# Patient Record
Sex: MALE | Race: WHITE | NOT HISPANIC OR LATINO | Employment: OTHER | URBAN - METROPOLITAN AREA
[De-identification: names, ages, dates, MRNs, and addresses within clinical notes are randomized per-mention and may not be internally consistent; named-entity substitution may affect disease eponyms.]

---

## 2024-01-13 ENCOUNTER — HOSPITAL ENCOUNTER (INPATIENT)
Facility: CLINIC | Age: 49
LOS: 13 days | Discharge: HOME | End: 2024-01-26
Attending: EMERGENCY MEDICINE | Admitting: INTERNAL MEDICINE
Payer: COMMERCIAL

## 2024-01-13 ENCOUNTER — APPOINTMENT (OUTPATIENT)
Dept: CT IMAGING | Facility: CLINIC | Age: 49
End: 2024-01-13
Attending: EMERGENCY MEDICINE
Payer: COMMERCIAL

## 2024-01-13 ENCOUNTER — APPOINTMENT (OUTPATIENT)
Dept: ULTRASOUND IMAGING | Facility: CLINIC | Age: 49
End: 2024-01-13
Attending: EMERGENCY MEDICINE
Payer: COMMERCIAL

## 2024-01-13 ENCOUNTER — APPOINTMENT (OUTPATIENT)
Dept: MRI IMAGING | Facility: CLINIC | Age: 49
End: 2024-01-13
Attending: EMERGENCY MEDICINE
Payer: COMMERCIAL

## 2024-01-13 DIAGNOSIS — N17.9 AKI (ACUTE KIDNEY INJURY) (H): ICD-10-CM

## 2024-01-13 DIAGNOSIS — K85.10 GALLSTONE PANCREATITIS: ICD-10-CM

## 2024-01-13 DIAGNOSIS — R79.89 ELEVATED LFTS: ICD-10-CM

## 2024-01-13 DIAGNOSIS — R00.0 TACHYCARDIA: ICD-10-CM

## 2024-01-13 DIAGNOSIS — K21.00 GASTROESOPHAGEAL REFLUX DISEASE WITH ESOPHAGITIS, UNSPECIFIED WHETHER HEMORRHAGE: ICD-10-CM

## 2024-01-13 DIAGNOSIS — K85.10 ACUTE BILIARY PANCREATITIS, UNSPECIFIED COMPLICATION STATUS: ICD-10-CM

## 2024-01-13 DIAGNOSIS — K83.1 BILIARY OBSTRUCTION (H): Primary | ICD-10-CM

## 2024-01-13 PROBLEM — K80.20 CALCULUS OF GALLBLADDER WITHOUT CHOLECYSTITIS: Status: ACTIVE | Noted: 2024-01-13

## 2024-01-13 PROBLEM — K85.90 ACUTE PANCREATITIS: Status: ACTIVE | Noted: 2024-01-13

## 2024-01-13 PROBLEM — E87.1 HYPONATREMIA: Status: ACTIVE | Noted: 2024-01-13

## 2024-01-13 LAB
ALBUMIN SERPL BCG-MCNC: 3.4 G/DL (ref 3.5–5.2)
ALP SERPL-CCNC: 106 U/L (ref 40–150)
ALT SERPL W P-5'-P-CCNC: 112 U/L (ref 0–70)
AMMONIA PLAS-SCNC: 14 UMOL/L (ref 16–60)
ANION GAP SERPL CALCULATED.3IONS-SCNC: 13 MMOL/L (ref 7–15)
AST SERPL W P-5'-P-CCNC: 133 U/L (ref 0–45)
BASOPHILS # BLD AUTO: 0.1 10E3/UL (ref 0–0.2)
BASOPHILS NFR BLD AUTO: 0 %
BILIRUB SERPL-MCNC: 6.1 MG/DL
BUN SERPL-MCNC: 68.4 MG/DL (ref 6–20)
CA-I BLD-MCNC: 3.2 MG/DL (ref 4.4–5.2)
CALCIUM SERPL-MCNC: 7.2 MG/DL (ref 8.6–10)
CHLORIDE SERPL-SCNC: 87 MMOL/L (ref 98–107)
CREAT SERPL-MCNC: 2.5 MG/DL (ref 0.67–1.17)
DEPRECATED HCO3 PLAS-SCNC: 22 MMOL/L (ref 22–29)
EGFRCR SERPLBLD CKD-EPI 2021: 31 ML/MIN/1.73M2
EOSINOPHIL # BLD AUTO: 0.1 10E3/UL (ref 0–0.7)
EOSINOPHIL NFR BLD AUTO: 0 %
ERYTHROCYTE [DISTWIDTH] IN BLOOD BY AUTOMATED COUNT: 14.8 % (ref 10–15)
GLUCOSE BLDC GLUCOMTR-MCNC: 102 MG/DL (ref 70–99)
GLUCOSE BLDC GLUCOMTR-MCNC: 111 MG/DL (ref 70–99)
GLUCOSE SERPL-MCNC: 141 MG/DL (ref 70–99)
HCT VFR BLD AUTO: 40.5 % (ref 40–53)
HGB BLD-MCNC: 14.3 G/DL (ref 13.3–17.7)
HOLD SPECIMEN: NORMAL
IMM GRANULOCYTES # BLD: 0.1 10E3/UL
IMM GRANULOCYTES NFR BLD: 1 %
LACTATE SERPL-SCNC: 1.6 MMOL/L (ref 0.7–2)
LIPASE SERPL-CCNC: 776 U/L (ref 13–60)
LYMPHOCYTES # BLD AUTO: 0.7 10E3/UL (ref 0.8–5.3)
LYMPHOCYTES NFR BLD AUTO: 4 %
MAGNESIUM SERPL-MCNC: 1.3 MG/DL (ref 1.7–2.3)
MCH RBC QN AUTO: 29.5 PG (ref 26.5–33)
MCHC RBC AUTO-ENTMCNC: 35.3 G/DL (ref 31.5–36.5)
MCV RBC AUTO: 84 FL (ref 78–100)
MONOCYTES # BLD AUTO: 1.7 10E3/UL (ref 0–1.3)
MONOCYTES NFR BLD AUTO: 10 %
NEUTROPHILS # BLD AUTO: 13.3 10E3/UL (ref 1.6–8.3)
NEUTROPHILS NFR BLD AUTO: 85 %
NRBC # BLD AUTO: 0 10E3/UL
NRBC BLD AUTO-RTO: 0 /100
PLATELET # BLD AUTO: 227 10E3/UL (ref 150–450)
POTASSIUM SERPL-SCNC: 4.4 MMOL/L (ref 3.4–5.3)
PROT SERPL-MCNC: 6.6 G/DL (ref 6.4–8.3)
RBC # BLD AUTO: 4.84 10E6/UL (ref 4.4–5.9)
SARS-COV-2 RNA RESP QL NAA+PROBE: NEGATIVE
SODIUM SERPL-SCNC: 122 MMOL/L (ref 135–145)
SODIUM SERPL-SCNC: 124 MMOL/L (ref 135–145)
TRIGL SERPL-MCNC: 185 MG/DL
WBC # BLD AUTO: 15.9 10E3/UL (ref 4–11)

## 2024-01-13 PROCEDURE — 87040 BLOOD CULTURE FOR BACTERIA: CPT | Performed by: EMERGENCY MEDICINE

## 2024-01-13 PROCEDURE — 36415 COLL VENOUS BLD VENIPUNCTURE: CPT | Performed by: INTERNAL MEDICINE

## 2024-01-13 PROCEDURE — 250N000011 HC RX IP 250 OP 636: Performed by: EMERGENCY MEDICINE

## 2024-01-13 PROCEDURE — 83605 ASSAY OF LACTIC ACID: CPT | Performed by: EMERGENCY MEDICINE

## 2024-01-13 PROCEDURE — 99223 1ST HOSP IP/OBS HIGH 75: CPT | Performed by: INTERNAL MEDICINE

## 2024-01-13 PROCEDURE — 36415 COLL VENOUS BLD VENIPUNCTURE: CPT | Performed by: EMERGENCY MEDICINE

## 2024-01-13 PROCEDURE — 84478 ASSAY OF TRIGLYCERIDES: CPT | Performed by: INTERNAL MEDICINE

## 2024-01-13 PROCEDURE — 87635 SARS-COV-2 COVID-19 AMP PRB: CPT | Performed by: INTERNAL MEDICINE

## 2024-01-13 PROCEDURE — 258N000003 HC RX IP 258 OP 636: Performed by: INTERNAL MEDICINE

## 2024-01-13 PROCEDURE — 250N000011 HC RX IP 250 OP 636: Performed by: INTERNAL MEDICINE

## 2024-01-13 PROCEDURE — 96375 TX/PRO/DX INJ NEW DRUG ADDON: CPT

## 2024-01-13 PROCEDURE — 80053 COMPREHEN METABOLIC PANEL: CPT | Performed by: EMERGENCY MEDICINE

## 2024-01-13 PROCEDURE — 74176 CT ABD & PELVIS W/O CONTRAST: CPT

## 2024-01-13 PROCEDURE — 74181 MRI ABDOMEN W/O CONTRAST: CPT

## 2024-01-13 PROCEDURE — 99285 EMERGENCY DEPT VISIT HI MDM: CPT | Mod: 25

## 2024-01-13 PROCEDURE — 76705 ECHO EXAM OF ABDOMEN: CPT

## 2024-01-13 PROCEDURE — 120N000001 HC R&B MED SURG/OB

## 2024-01-13 PROCEDURE — 96374 THER/PROPH/DIAG INJ IV PUSH: CPT

## 2024-01-13 PROCEDURE — 82140 ASSAY OF AMMONIA: CPT | Performed by: EMERGENCY MEDICINE

## 2024-01-13 PROCEDURE — 83735 ASSAY OF MAGNESIUM: CPT | Performed by: INTERNAL MEDICINE

## 2024-01-13 PROCEDURE — 83690 ASSAY OF LIPASE: CPT | Performed by: EMERGENCY MEDICINE

## 2024-01-13 PROCEDURE — 120N000013 HC R&B IMCU

## 2024-01-13 PROCEDURE — 85025 COMPLETE CBC W/AUTO DIFF WBC: CPT | Performed by: EMERGENCY MEDICINE

## 2024-01-13 PROCEDURE — 82330 ASSAY OF CALCIUM: CPT | Performed by: INTERNAL MEDICINE

## 2024-01-13 PROCEDURE — 84295 ASSAY OF SERUM SODIUM: CPT | Performed by: INTERNAL MEDICINE

## 2024-01-13 PROCEDURE — 258N000003 HC RX IP 258 OP 636: Performed by: EMERGENCY MEDICINE

## 2024-01-13 RX ORDER — NAPROXEN SODIUM 220 MG
220 TABLET ORAL EVERY 12 HOURS PRN
Status: ON HOLD | COMMUNITY
End: 2024-01-26

## 2024-01-13 RX ORDER — ONDANSETRON 2 MG/ML
4 INJECTION INTRAMUSCULAR; INTRAVENOUS EVERY 30 MIN PRN
Status: DISCONTINUED | OUTPATIENT
Start: 2024-01-13 | End: 2024-01-13

## 2024-01-13 RX ORDER — LIDOCAINE 40 MG/G
CREAM TOPICAL
Status: DISCONTINUED | OUTPATIENT
Start: 2024-01-13 | End: 2024-01-26 | Stop reason: HOSPADM

## 2024-01-13 RX ORDER — NITROGLYCERIN 0.4 MG/1
0.4 TABLET SUBLINGUAL EVERY 5 MIN PRN
Status: DISCONTINUED | OUTPATIENT
Start: 2024-01-13 | End: 2024-01-26 | Stop reason: HOSPADM

## 2024-01-13 RX ORDER — ENOXAPARIN SODIUM 100 MG/ML
40 INJECTION SUBCUTANEOUS EVERY 24 HOURS
Status: DISCONTINUED | OUTPATIENT
Start: 2024-01-13 | End: 2024-01-13

## 2024-01-13 RX ORDER — PROCHLORPERAZINE MALEATE 10 MG
10 TABLET ORAL EVERY 6 HOURS PRN
Status: DISCONTINUED | OUTPATIENT
Start: 2024-01-13 | End: 2024-01-26 | Stop reason: HOSPADM

## 2024-01-13 RX ORDER — MAGNESIUM SULFATE HEPTAHYDRATE 40 MG/ML
4 INJECTION, SOLUTION INTRAVENOUS ONCE
Status: COMPLETED | OUTPATIENT
Start: 2024-01-13 | End: 2024-01-14

## 2024-01-13 RX ORDER — PROCHLORPERAZINE 25 MG
25 SUPPOSITORY, RECTAL RECTAL EVERY 12 HOURS PRN
Status: DISCONTINUED | OUTPATIENT
Start: 2024-01-13 | End: 2024-01-26 | Stop reason: HOSPADM

## 2024-01-13 RX ORDER — HYDROMORPHONE HYDROCHLORIDE 1 MG/ML
0.5 INJECTION, SOLUTION INTRAMUSCULAR; INTRAVENOUS; SUBCUTANEOUS
Status: DISCONTINUED | OUTPATIENT
Start: 2024-01-13 | End: 2024-01-15

## 2024-01-13 RX ORDER — ONDANSETRON 2 MG/ML
4 INJECTION INTRAMUSCULAR; INTRAVENOUS ONCE
Status: COMPLETED | OUTPATIENT
Start: 2024-01-13 | End: 2024-01-13

## 2024-01-13 RX ORDER — AMOXICILLIN 250 MG
2 CAPSULE ORAL 2 TIMES DAILY PRN
Status: DISCONTINUED | OUTPATIENT
Start: 2024-01-13 | End: 2024-01-26 | Stop reason: HOSPADM

## 2024-01-13 RX ORDER — ONDANSETRON 4 MG/1
4 TABLET, ORALLY DISINTEGRATING ORAL EVERY 6 HOURS PRN
Status: DISCONTINUED | OUTPATIENT
Start: 2024-01-13 | End: 2024-01-26 | Stop reason: HOSPADM

## 2024-01-13 RX ORDER — IBUPROFEN 200 MG
400 TABLET ORAL EVERY 6 HOURS PRN
Status: ON HOLD | COMMUNITY
End: 2024-01-26

## 2024-01-13 RX ORDER — HYDROMORPHONE HYDROCHLORIDE 1 MG/ML
0.3 INJECTION, SOLUTION INTRAMUSCULAR; INTRAVENOUS; SUBCUTANEOUS
Status: DISCONTINUED | OUTPATIENT
Start: 2024-01-13 | End: 2024-01-16

## 2024-01-13 RX ORDER — PIPERACILLIN SODIUM, TAZOBACTAM SODIUM 3; .375 G/15ML; G/15ML
3.38 INJECTION, POWDER, LYOPHILIZED, FOR SOLUTION INTRAVENOUS EVERY 6 HOURS
Status: DISCONTINUED | OUTPATIENT
Start: 2024-01-13 | End: 2024-01-14

## 2024-01-13 RX ORDER — SODIUM CHLORIDE, SODIUM LACTATE, POTASSIUM CHLORIDE, CALCIUM CHLORIDE 600; 310; 30; 20 MG/100ML; MG/100ML; MG/100ML; MG/100ML
INJECTION, SOLUTION INTRAVENOUS CONTINUOUS
Status: DISCONTINUED | OUTPATIENT
Start: 2024-01-13 | End: 2024-01-15

## 2024-01-13 RX ORDER — AMOXICILLIN 250 MG
1 CAPSULE ORAL 2 TIMES DAILY PRN
Status: DISCONTINUED | OUTPATIENT
Start: 2024-01-13 | End: 2024-01-26 | Stop reason: HOSPADM

## 2024-01-13 RX ORDER — MORPHINE SULFATE 4 MG/ML
4 INJECTION, SOLUTION INTRAMUSCULAR; INTRAVENOUS
Status: DISCONTINUED | OUTPATIENT
Start: 2024-01-13 | End: 2024-01-13

## 2024-01-13 RX ORDER — MORPHINE SULFATE 4 MG/ML
4 INJECTION, SOLUTION INTRAMUSCULAR; INTRAVENOUS ONCE
Status: COMPLETED | OUTPATIENT
Start: 2024-01-13 | End: 2024-01-13

## 2024-01-13 RX ORDER — PIPERACILLIN SODIUM, TAZOBACTAM SODIUM 4; .5 G/20ML; G/20ML
4.5 INJECTION, POWDER, LYOPHILIZED, FOR SOLUTION INTRAVENOUS ONCE
Status: COMPLETED | OUTPATIENT
Start: 2024-01-13 | End: 2024-01-13

## 2024-01-13 RX ORDER — LIDOCAINE 40 MG/G
CREAM TOPICAL
Status: DISCONTINUED | OUTPATIENT
Start: 2024-01-13 | End: 2024-01-13

## 2024-01-13 RX ORDER — ONDANSETRON 2 MG/ML
4 INJECTION INTRAMUSCULAR; INTRAVENOUS EVERY 6 HOURS PRN
Status: DISCONTINUED | OUTPATIENT
Start: 2024-01-13 | End: 2024-01-26 | Stop reason: HOSPADM

## 2024-01-13 RX ADMIN — PIPERACILLIN AND TAZOBACTAM 3.38 G: 3; .375 INJECTION, POWDER, FOR SOLUTION INTRAVENOUS at 21:10

## 2024-01-13 RX ADMIN — SODIUM CHLORIDE, POTASSIUM CHLORIDE, SODIUM LACTATE AND CALCIUM CHLORIDE: 600; 310; 30; 20 INJECTION, SOLUTION INTRAVENOUS at 21:46

## 2024-01-13 RX ADMIN — MORPHINE SULFATE 4 MG: 4 INJECTION, SOLUTION INTRAMUSCULAR; INTRAVENOUS at 13:47

## 2024-01-13 RX ADMIN — ONDANSETRON 4 MG: 2 INJECTION INTRAMUSCULAR; INTRAVENOUS at 13:47

## 2024-01-13 RX ADMIN — ONDANSETRON 4 MG: 2 INJECTION INTRAMUSCULAR; INTRAVENOUS at 19:06

## 2024-01-13 RX ADMIN — MAGNESIUM SULFATE IN WATER 4 G: 40 INJECTION, SOLUTION INTRAVENOUS at 23:35

## 2024-01-13 RX ADMIN — SODIUM CHLORIDE 1000 ML: 9 INJECTION, SOLUTION INTRAVENOUS at 17:05

## 2024-01-13 RX ADMIN — PIPERACILLIN AND TAZOBACTAM 4.5 G: 4; .5 INJECTION, POWDER, FOR SOLUTION INTRAVENOUS at 15:32

## 2024-01-13 RX ADMIN — SODIUM CHLORIDE 1000 ML: 9 INJECTION, SOLUTION INTRAVENOUS at 16:26

## 2024-01-13 RX ADMIN — MORPHINE SULFATE 4 MG: 4 INJECTION, SOLUTION INTRAMUSCULAR; INTRAVENOUS at 19:06

## 2024-01-13 RX ADMIN — SODIUM CHLORIDE, POTASSIUM CHLORIDE, SODIUM LACTATE AND CALCIUM CHLORIDE 1000 ML: 600; 310; 30; 20 INJECTION, SOLUTION INTRAVENOUS at 19:53

## 2024-01-13 ASSESSMENT — ACTIVITIES OF DAILY LIVING (ADL)
ADLS_ACUITY_SCORE: 21
ADLS_ACUITY_SCORE: 35

## 2024-01-13 NOTE — PHARMACY-ADMISSION MEDICATION HISTORY
Pharmacy Intern Admission Medication History    Admission medication history is complete. The information provided in this note is only as accurate as the sources available at the time of the update.    Information Source(s): Patient via in-person    Pertinent Information: Verified that the patient does not take any prescription or OTC medications besides the two below. He began taking them a few days ago for pain.     Changes made to PTA medication list:  Added: Advil and Aleve  Deleted: None  Changed: None    Medication Affordability:  Not including over the counter (OTC) medications, was there a time in the past 3 months when you did not take your medications as prescribed because of cost?: No    Allergies reviewed with patient and updates made in EHR: yes    Medication History Completed By: Tabby Lipscomb 1/13/2024 4:27 PM    PTA Med List   Medication Sig Last Dose    ibuprofen (ADVIL/MOTRIN) 200 MG tablet Take 400 mg by mouth every 6 hours as needed for pain  at PRN    naproxen sodium (ANAPROX) 220 MG tablet Take 220 mg by mouth every 12 hours as needed for moderate pain  at PRN

## 2024-01-13 NOTE — ED NOTES
1 week of abdominal pain and bloating. Patient reports he had diarrhea last week, now having nausea and dry heaves. He reports pale colored stools. Patient yellow in appearance. Denies hx of liver issues. Patient states he is an occasional drinker. No previous surgeries on abdomen.

## 2024-01-13 NOTE — CONSULTS
GASTROENTEROLOGY CONSULTATION     Talha Alexander  [address]  [place] [address]  48 year old male    Admission Date/Time: 1/13/2024  Primary Care Provider: No Ref-Primary, Physician    We were asked to see the patient in consultation by Dr. Wallace for pancreatitis.        HPI:  Talha Alexander is a 48 year old male with no significant PMH who presents with 4 days of abdominal pain and distension. The pain is diffuse but worse in the LUQ. Prior to that he had mild nausea and diarrhea.  He has had no appetite and reports dry heaving.  He has had subjective fevers and chills.  With morphine his pain is now 3-4/10 but was 6-7/10 in severity before.  He is jaundiced.  He is currently alert but his girlfriend reports that he has not been able to sleep more than 15 min at a time and has been somnolent at times.  Stools have been pale as well.    EtOh consumption is described as rare.  No new medications.  Non-contrast CT scan in the ER shows acute pancreatitis with extensive peripancreatic inflammation.  Cholelithiasis but no obstructing stones are seen. Reactive wall thickening of the duodenum related to the pancreatitis is noted as well as small ascites and a trace pleural effusion.    LFTs show AP of 106, Tbil 6.1.  , . WBC is 15.9. Lipase is 776.  Zosyn was started. 2L of NS bolus was ordered. Other labs are notable for BRENDA of 2.5. Lactic acid is 1.6.      ROS: A comprehensive ten point review of systems was negative aside from those in mentioned in the HPI.      MEDICATIONS: No current facility-administered medications on file prior to encounter.  ibuprofen (ADVIL/MOTRIN) 200 MG tablet, Take 400 mg by mouth every 6 hours as needed for pain  naproxen sodium (ANAPROX) 220 MG tablet, Take 220 mg by mouth every 12 hours as needed for moderate pain        ALLERGIES:   Allergies   Allergen Reactions     Peanut-Containing Drug Products      Tree Nuts [Nuts]      PMH: none    PSH:  none      SOCIAL HISTORY: + smoking, rare ETOH, denies recreational drugs, works for [employer].       FAMILY HISTORY:  No hx of colon cancer, liver disease or other GI diseases.    PHYSICAL EXAM:   BP (!) 121/98   Pulse 105   Temp 97.1  F (36.2  C) (Temporal)   Resp 19   Wt 97.5 kg (215 lb)   SpO2 97%     Constitutional: NAD, comfortable  Cardiovascular: RRR, normal S1 and S2, no r/c/g/m  Respiratory: CTAB  Head: Normocephalic. Atraumatic.    Neck: Neck supple. Eyes:  PERRL, no icterus  ENT: Neck without nodes, hearing adequate, pharynx normal without erythema or exudate  Abdomen:   Diffusely tender, distended, +BS  NEURO: grossly negative  SKIN: no suspicious lesions or rashes            ADDITIONAL COMMENTS:   I reviewed the patient's new clinical lab test results.   Recent Labs   Lab Test 01/13/24  1333   WBC 15.9*   HGB 14.3   MCV 84        Recent Labs   Lab Test 01/13/24  1333   *   POTASSIUM 4.4   CHLORIDE 87*   CO2 22   BUN 68.4*   CR 2.50*   ANIONGAP 13   LUBNA 7.2*   *     Recent Labs   Lab Test 01/13/24  1333   ALBUMIN 3.4*   BILITOTAL 6.1*   *   *   ALKPHOS 106   LIPASE 776*     IMAGING:  CT ABD PELVIS 1/13/24    IMPRESSION:      1.  Acute pancreatitis with extensive peripancreatic inflammation as described. No organized peripancreatic fluid collections. This noncontrast exam cannot evaluate for parenchymal necrosis.     2.  Cholelithiasis. No biliary ductal dilation.     3.  Reactive wall thickening of the duodenum related to the pancreatitis.     4.  Small amount of ascites.     5.  Trace left pleural effusion with adjacent atelectasis.        .    CONSULTATION ASSESSMENT AND PLAN:    49 yo M admitted with abdominal pain, elevated LFTs, with imaging showing pancreatitis, cholelithiasis consistent with gallstone pancreatitis.  BISAP score is 2-3 ( 3 if you count trace pleural effusion) associated with increased risk of complications.     #Gallstone pancreatitis  -MRCP is  pending shortly.  If choledocholithiasis is confirmed, we will proceed with ERCP tomorrow. Discussed with Dr. Wilson.  -If MRCP is negative, continue supportive treatment with aggressive fluid resuscitation with LR with another 1-2 L and then 150 mL/hr or as tolerated.  (Monitor respiratory status given trace pleural effusion seen on imaging).  -trend LFTs.  -NPO.   -continue pain medications and anti-emetics as needed.   -TG were added on.   -continue antibiotics.    -Please call if there is concern for cholangitis.      #Hepatic Steatosis-   -Abstain from alcohol  -Outpatient GI clinic follow up    #BRENDA - monitor response to IVF. Will defer further work up to primary team as needed.     Thank your for allowing me to participate in the care of this patient.  Please call with any questions.    Indira Mccloud MD  [facility]  Total time: ~40 min    ADDENDUM: MRCP shows obstructing bile duct stones.  The pt is scheduled for ERCP tomorrow at 9:30AM.      The peripancreatic fluid is now described as hemorrhagic.  I reviewed with IR to clarify if there was concern for acute bleeding.  They did not recommend any IR intervention at this time or further imaging.      I am concerned that if there is further bleeding, the patient may develop compartment syndrome.  I updated Dr. Jay and recommended a surgery consult.  (Cornejo placement if not done already and serial bladder pressure measurement would be helpful.)  Dr. Jay will also repeat labs tonight to see if we are adequately volume resuscitating him and see him again tonight.  Ideally, we would see the BUN decrease and a hemo-dilutional effect from fluid resucitation but a dropping hemoglobin may be due to intrabdominal bleeding as well so this will need to be interpreted in the setting of his clinical stability and abdominal exam.  Urine output of >0.5mL/kg/hr would be an alternative way to assessing adequate fluid resuscitation.  We discussed that  additional boluses would be reasonable before starting maintenance fluids if there has not been significant improvement after the 3 L already given/ordered.    I will follow up again early in the morning but please call with any questions or updates in changes in clinical status.      Indira Mccloud MD  [facility]  [phone]

## 2024-01-13 NOTE — H&P
[facility]    History and Physical  Hospitalist       Date of Admission:  1/13/2024    Assessment & Plan   Talha Alexander is a 48 year old male With no significant past medical history presents with acute pancreatitis, possibly biliary in origin.    Principal Problem:    BRENDA (acute kidney injury) (H24)    Acute pancreatitis    Calculus of gallbladder without cholecystitis    Hyponatremia    Elevated LFTs  Hypocalcemia-ionized calcium ordered.    Acute biliary pancreatitis, unspecified complication status  28-year-old male with no significant past medical history presents with 2-day history of abdominal bloating, CT abdomen indicates extensive signs of acute pancreatitis, no choledocholithiasis noted but Kylie lithiasis noted without cholecystitis.  Ultrasound of the abdomen was also obtained which did not indicate any choledocholithiasis.  Patient was noted to have lipase 776, he takes alcohol occasionally, last drink was 2 to 3 weeks ago, he was noted to have BRENDA with a creatinine of 2.5 and sodium of 122.  He had poor oral intake for 2 days.  LFTs are also elevated with a bilirubin 6.1, patient was probably would have passed a CBD stone.  White count very elevated at 15.9.  [facility] was consulted in the ED and they recommended MRCP to decide whether to do ERCP.  Admit to inpatient, continue IV fluids, LR at 150 cc/h, patient did receive a dose of Zosyn in the ER, will not be continuing that since there is no signs of necrotic pancreatitis or cholecystitis.  --[facility] has been consulted, MRCP has been ordered, please request general surgery consult for future cholecystectomy Depending on how patient improves.  --Continue IV narcotics for pain control, continue n.p.o., IV fluids.  --Repeat labs in a.m. sodium is low at 122, possibly hypovolemic considering his elevated BUN levels.  --will check sodium every 4 hours, slow correction recommended, 6 to 8 mEq over next 24  hours.  --His BRENDA seems to be prerenal considering the events, no prior labs to compare.  Addendum :7:22 PM-MRCP RESULTED .  At least 2 choledocholiths within the distal common bile duct measuring up to 3 mm with mild upstream biliary ductal dilation.Acute hemorrhagic pancreatitis with extensive peripancreatic inflammation and fluid as described. No organized peripancreatic fluid collections. No pancreatic ductal dilation. This noncontrast exam cannot evaluate for parenchymal necrosis.  WILL HOLD LOVENOX ,GI following . ERCP ? Timing, npo.will continue antibiotics as necrosis cannot be differentiated and wbc is high .  Discussed with  GI , repeated labs to further evaluate the fluid status .  Ionized calcium is low , possibly due to severe pancreatitis and saponification .  Addendum 10 pm  Changed patient to IMC status.  DVT Prophylaxis: Enoxaparin (Lovenox) subcutaneous stopped once hemorrhagic pancreatitis noted in CT   Code Status: Full Code    Disposition: Expected discharge in 2 to 3 days depending on improvement of symptoms and labs.    Carisa Jay MD    Primary Care Physician   Physician No Ref-Primary    Chief Complaint   Abdominal swelling and bloating 2 days    History is obtained from the patient and medical records    History of Present Illness   Talha Alexander is a 48 year old male with no significant past medical history, history of occasional alcohol use, last time he had some alcohol was 2 to 3 weeks ago, symptoms started as diarrhea 6 days ago, he had noticed some per colored stool over last 48 hours, diarrhea since resolved, patient was having nausea for few days, days prior to admission he noticed that he has significant abdominal bloating, increased nausea and generalized abdominal tenderness and pain.  He did not have any diarrhea, family noticed that he was jaundiced on the day of admission.  Did not have any fever or chills, no similar illness noted in the family, no prior history of  pancreatitis, no family history of pancreatitis.  Patient did not see a doctor on regular basis and was never admitted in the hospital recently for any illness.  In the emergency department labs were drawn and he was noted to have a white count of 15.9, a dose of Zosyn was given, CT abdomen noted to show extensive pancreatitis with peripancreatic inflammation and fluid, no organized fluid collection noted, bilirubin 6.1, glucose 141, lipase 776, LFTs are elevated x 4, BUN 68.4 with a creatinine of 2.5 and sodium 122.  Calcium is also low at 7.2, will request ionized calcium levels.    Past Medical History    I have reviewed this patient's medical history and updated it with pertinent information if needed.       Past Surgical History   I have reviewed this patient's surgical history and updated it with pertinent information if needed.      Prior to Admission Medications   None     Allergies   Allergies   Allergen Reactions    Peanut-Containing Drug Products     Tree Nuts [Nuts]        Social History   I have reviewed this patient's social history and updated it with pertinent information if needed. Talha STEVENS Catherine      Family History   I have reviewed this patient's family history and updated it with pertinent information if needed.     Review of Systems   The 10 point Review of Systems is negative other than noted in the HPI or here.     Physical Exam   Temp: 97.1  F (36.2  C) Temp src: Temporal BP: (!) 121/98 Pulse: 105   Resp: 19 SpO2: 97 %      Vital Signs with Ranges  Temp:  [97.1  F (36.2  C)] 97.1  F (36.2  C)  Pulse:  [105-115] 105  Resp:  [10-27] 19  BP: (121-142)/(89-98) 121/98  SpO2:  [95 %-98 %] 97 %  215 lbs 0 oz    Constitutional: Awake, alert, cooperative, no apparent distress.  Eyes: Conjunctiva and pupils examined and normal.  HEENT: Moist mucous membranes, normal dentition.  Respiratory: Clear to auscultation bilaterally, no crackles or wheezing.  Cardiovascular: Regular rate and rhythm, normal S1  and S2, and no murmur noted.  GI: Abdomen is distended, bowel sounds present.  Tenderness noted in the epigastric area.  Lymph/Hematologic: No anterior cervical or supraclavicular adenopathy.  Skin: No rashes, no cyanosis, no edema.  Musculoskeletal: No joint swelling, erythema or tenderness.  Neurologic: Cranial nerves 2-12 intact, normal strength and sensation.  Psychiatric: Alert, oriented to person, place and time, no obvious anxiety or depression.    Data   Data reviewed today:  I personally reviewed LABS AND IMAGING BELOW .  Recent Labs   Lab 01/13/24  1333   WBC 15.9*   HGB 14.3   MCV 84      *   POTASSIUM 4.4   CHLORIDE 87*   CO2 22   BUN 68.4*   CR 2.50*   ANIONGAP 13   LUBNA 7.2*   *   ALBUMIN 3.4*   PROTTOTAL 6.6   BILITOTAL 6.1*   ALKPHOS 106   *   *   LIPASE 776*       Imaging:  Recent Results (from the past 24 hour(s))   CT Abdomen Pelvis w/o Contrast    Narrative    EXAM: CT ABDOMEN PELVIS W/O CONTRAST  LOCATION: [facility]  DATE: 1/13/2024    INDICATION: Abdominal pain.  COMPARISON: None.  TECHNIQUE: CT scan of the abdomen and pelvis was performed without IV contrast. Multiplanar reformats were obtained. Dose reduction techniques were used.  CONTRAST: None.    FINDINGS:     LOWER CHEST: Trace left pleural effusion. Left basilar atelectasis.    HEPATOBILIARY: Cholelithiasis. No biliary ductal dilation. No liver lesions.    PANCREAS: Acute pancreatitis with extensive inflammation and unorganized fluid surrounding the entire pancreas extending into the left upper quadrant, central mesentery, and along the left greater right anterior pararenal fascia. Pockets of unorganized   fluid are present along the greater curvature of the stomach and along the left paracolic gutter. Small amount of ascites is also present around the liver and within the right lower quadrant. No organized peripancreatic fluid collections. No visible   ductal  dilation.    SPLEEN: Normal.    ADRENAL GLANDS: Normal.    KIDNEYS/BLADDER: Normal.    BOWEL: No bowel obstruction. Reactive wall thickening of the duodenum. Appendix is not clearly visualized.    LYMPH NODES: No enlarged lymph node.    VASCULATURE: No abdominal aortic aneurysm.    PELVIC ORGANS: Normal.    MUSCULOSKELETAL: Small fat-containing periumbilical hernia. Multilevel degenerative changes of the spine.      Impression    IMPRESSION:     1.  Acute pancreatitis with extensive peripancreatic inflammation as described. No organized peripancreatic fluid collections. This noncontrast exam cannot evaluate for parenchymal necrosis.    2.  Cholelithiasis. No biliary ductal dilation.    3.  Reactive wall thickening of the duodenum related to the pancreatitis.    4.  Small amount of ascites.    5.  Trace left pleural effusion with adjacent atelectasis.   US Abdomen Limited    Narrative    EXAM: US ABDOMEN LIMITED  LOCATION: [facility]  DATE: 1/13/2024    INDICATION: RUQ pain  COMPARISON: CT earlier today  TECHNIQUE: Limited abdominal ultrasound.    FINDINGS:    GALLBLADDER: Small shadowing stones and sludge within the gallbladder lumen. No gallbladder wall thickening or pericholecystic fluid. Sonographic Mark sign was negative.    BILE DUCTS: No biliary dilatation. The common duct measures 6 mm.    LIVER: Heterogeneous echogenicity and echotexture with mild hepatic steatosis. No focal mass.    RIGHT KIDNEY: No hydronephrosis.    PANCREAS: The pancreas is largely obscured by overlying gas.    Small ascites..      Impression    IMPRESSION:  1.  Cholelithiasis.  2.  Mildly heterogeneous liver with mild hepatic steatosis.  3.  Small ascites.  4.  Known pancreatitis was better assessed on the CT earlier today.

## 2024-01-13 NOTE — ED TRIAGE NOTES
Diarrhea last weekend, nausea, sore abdomen, can't eat or sleep. Dry heaves. Pt appears jaundice.

## 2024-01-13 NOTE — ED PROVIDER NOTES
History     Chief Complaint:  Abdominal Pain       HPI   Talha Alexander is a 48 year old male who presents with 1 week of abdominal bloating with loose stools and occasional dry heaves.  He states that he did have some diarrhea earlier in the week that is now resolving, but does endorse dry heaves without actual vomit for the last 3 days as well.  He states that his stools have become more pale and his female friend at the bedside is noted that he is more jaundiced appearing with  yellowing of his eyes and skin.  Patient does not drink excess of alcohol, has no history of hepatitis, and no abdominal pain but states he has not been to a doctor in a very long time.  No chest pain, no numbness or tingling.  No fainting.      Independent Historian:   Yes female friend at bedside confirms the above history.    Review of External Notes: N/A      Allergies:  Peanut-Containing Drug Products  Tree Nuts [Nuts]     Medications:    No current outpatient medications on file.      Past Medical History:    No past medical history on file.    Past Surgical History:    No past surgical history on file.     Family History:    family history is not on file.    Social History:     PCP: No primary care provider on file.     Physical Exam   Patient Vitals for the past 24 hrs:   BP Temp Temp src Pulse Resp SpO2 Weight   01/13/24 1341 -- -- -- 108 12 98 % --   01/13/24 1340 -- -- -- 109 10 98 % --   01/13/24 1339 -- -- -- 110 14 98 % --   01/13/24 1338 -- -- -- 108 12 97 % --   01/13/24 1325 (!) 121/98 -- -- 111 21 97 % --   01/13/24 1311 (!) 142/89 97.1  F (36.2  C) Temporal 115 24 96 % 97.5 kg (215 lb)        Physical Exam  Vitals: reviewed by me  General: Pt seen on [facility], pleasant, cooperative, and alert to conversation  Eyes: Tracking well, clear conjunctiva BL  ENT: MMM, midline trachea.   Lungs: No tachypnea, no accessory muscle use. No respiratory distress.   CV: Rate as above  Abd: Soft, quite bloated, tender in the  upper left and upper right quadrants without focal rebound.  MSK: no joint effusion.  No evidence of trauma  Skin: No rash  Neuro: Clear speech and no facial droop.  Psych: Not RIS, no e/o AH/VH          Emergency Department Course   No results found for this or any previous visit.      Imaging:  US Abdomen Limited   Final Result   IMPRESSION:   1.  Cholelithiasis.   2.  Mildly heterogeneous liver with mild hepatic steatosis.   3.  Small ascites.   4.  Known pancreatitis was better assessed on the CT earlier today.            CT Abdomen Pelvis w/o Contrast   Final Result   IMPRESSION:       1.  Acute pancreatitis with extensive peripancreatic inflammation as described. No organized peripancreatic fluid collections. This noncontrast exam cannot evaluate for parenchymal necrosis.      2.  Cholelithiasis. No biliary ductal dilation.      3.  Reactive wall thickening of the duodenum related to the pancreatitis.      4.  Small amount of ascites.      5.  Trace left pleural effusion with adjacent atelectasis.      MR Abdomen MRCP without Contrast    (Results Pending)      Report per radiology    Laboratory:  Labs Ordered and Resulted from Time of ED Arrival to Time of ED Departure   COMPREHENSIVE METABOLIC PANEL - Abnormal       Result Value    Sodium 122 (*)     Potassium 4.4      Carbon Dioxide (CO2) 22      Anion Gap 13      Urea Nitrogen 68.4 (*)     Creatinine 2.50 (*)     GFR Estimate 31 (*)     Calcium 7.2 (*)     Chloride 87 (*)     Glucose 141 (*)     Alkaline Phosphatase 106       (*)      (*)     Protein Total 6.6      Albumin 3.4 (*)     Bilirubin Total 6.1 (*)    LIPASE - Abnormal    Lipase 776 (*)    AMMONIA - Abnormal    Ammonia 14 (*)    CBC WITH PLATELETS AND DIFFERENTIAL - Abnormal    WBC Count 15.9 (*)     RBC Count 4.84      Hemoglobin 14.3      Hematocrit 40.5      MCV 84      MCH 29.5      MCHC 35.3      RDW 14.8      Platelet Count 227      % Neutrophils 85      % Lymphocytes 4      %  Monocytes 10      % Eosinophils 0      % Basophils 0      % Immature Granulocytes 1      NRBCs per 100 WBC 0      Absolute Neutrophils 13.3 (*)     Absolute Lymphocytes 0.7 (*)     Absolute Monocytes 1.7 (*)     Absolute Eosinophils 0.1      Absolute Basophils 0.1      Absolute Immature Granulocytes 0.1      Absolute NRBCs 0.0     LACTIC ACID WHOLE BLOOD - Normal    Lactic Acid 1.6     BLOOD CULTURE   BLOOD CULTURE        Emergency Department Course & Assessments:      Interventions:  Medications   ondansetron (ZOFRAN) injection 4 mg (has no administration in time range)   morphine (PF) injection 4 mg (has no administration in time range)   piperacillin-tazobactam (ZOSYN) 4.5 g vial to attach to  mL bag (4.5 g Intravenous $New Bag 1/13/24 1532)   sodium chloride 0.9% BOLUS 1,000 mL (has no administration in time range)   sodium chloride 0.9% BOLUS 1,000 mL (has no administration in time range)   morphine (PF) injection 4 mg (4 mg Intravenous $Given 1/13/24 1347)   ondansetron (ZOFRAN) injection 4 mg (4 mg Intravenous $Given 1/13/24 1347)            Consultations/Discussion of Management or Tests:  Yes I spoke to Dr. Barron of the [facility] who is recommended an MRCP now in the ER and will consult on the patient and possibly proceed with an ERCP today.           Social Determinants of Health affecting care:   Stress/Adjustment Disorders      Disposition:  The patient was admitted to the hospital under the care of Dr. Jay.     Impression & Plan        Medical Decision Making:  This is a very pleasant 48-year-old who presents to the ER with what appears to be about a week of abdominal bloating, here now with significant jaundice and I think this is likely due to gallstone pancreatitis.  His ultrasound does show cholelithiasis, but no obvious stone seen, and CBD is technically within normal limits.  Given his pancreatitis and LFTs and jaundice however, I do think an MRCP and ERCP are indicated.   Given his white count and how pale he looks, will plan for antibiotics at this time, and monitor very carefully until GI can take him for the ERCP, or until the next inpatient bed is available as I spoke to the hospitalist team and they have generously agreed to accept care of the patient.  Abdomen is not peritonitic, patient seems to be doing well with pain meds currently.    Diagnosis:    ICD-10-CM    1. Gallstone pancreatitis  K85.10       2. Elevated LFTs  R79.89       3. Acute biliary pancreatitis, unspecified complication status  K85.10       4. BRENDA (acute kidney injury) (H24)  N17.9                   1/13/2024   All Wallace*        All Wallace MD  01/13/24 1559

## 2024-01-14 ENCOUNTER — ANESTHESIA (OUTPATIENT)
Dept: SURGERY | Facility: CLINIC | Age: 49
End: 2024-01-14
Payer: COMMERCIAL

## 2024-01-14 ENCOUNTER — APPOINTMENT (OUTPATIENT)
Dept: GENERAL RADIOLOGY | Facility: CLINIC | Age: 49
End: 2024-01-14
Attending: INTERNAL MEDICINE
Payer: COMMERCIAL

## 2024-01-14 ENCOUNTER — ANESTHESIA EVENT (OUTPATIENT)
Dept: SURGERY | Facility: CLINIC | Age: 49
End: 2024-01-14
Payer: COMMERCIAL

## 2024-01-14 LAB
ALBUMIN SERPL BCG-MCNC: 2.7 G/DL (ref 3.5–5.2)
ALP SERPL-CCNC: 79 U/L (ref 40–150)
ALT SERPL W P-5'-P-CCNC: 81 U/L (ref 0–70)
ANION GAP SERPL CALCULATED.3IONS-SCNC: 16 MMOL/L (ref 7–15)
AST SERPL W P-5'-P-CCNC: 106 U/L (ref 0–45)
BILIRUB SERPL-MCNC: 3.7 MG/DL
BUN SERPL-MCNC: 75.9 MG/DL (ref 6–20)
CALCIUM SERPL-MCNC: 6.6 MG/DL (ref 8.6–10)
CHLORIDE SERPL-SCNC: 92 MMOL/L (ref 98–107)
CREAT SERPL-MCNC: 3.06 MG/DL (ref 0.67–1.17)
CREAT SERPL-MCNC: 3.27 MG/DL (ref 0.67–1.17)
CREAT SERPL-MCNC: 3.61 MG/DL (ref 0.67–1.17)
CREAT UR-MCNC: 63.3 MG/DL
DEPRECATED HCO3 PLAS-SCNC: 16 MMOL/L (ref 22–29)
EGFRCR SERPLBLD CKD-EPI 2021: 20 ML/MIN/1.73M2
EGFRCR SERPLBLD CKD-EPI 2021: 23 ML/MIN/1.73M2
EGFRCR SERPLBLD CKD-EPI 2021: 25 ML/MIN/1.73M2
ERCP: NORMAL
ERYTHROCYTE [DISTWIDTH] IN BLOOD BY AUTOMATED COUNT: 15.4 % (ref 10–15)
FRACT EXCRET NA UR+SERPL-RTO: 1.4 %
GLUCOSE SERPL-MCNC: 114 MG/DL (ref 70–99)
HCT VFR BLD AUTO: 33.5 % (ref 40–53)
HGB BLD-MCNC: 11.2 G/DL (ref 13.3–17.7)
HGB BLD-MCNC: 11.8 G/DL (ref 13.3–17.7)
MAGNESIUM SERPL-MCNC: 2.5 MG/DL (ref 1.7–2.3)
MCH RBC QN AUTO: 29.5 PG (ref 26.5–33)
MCHC RBC AUTO-ENTMCNC: 35.2 G/DL (ref 31.5–36.5)
MCV RBC AUTO: 84 FL (ref 78–100)
OSMOLALITY UR: 393 MMOL/KG (ref 100–1200)
PLATELET # BLD AUTO: 196 10E3/UL (ref 150–450)
POTASSIUM SERPL-SCNC: 4.6 MMOL/L (ref 3.4–5.3)
PROT SERPL-MCNC: 5.7 G/DL (ref 6.4–8.3)
RBC # BLD AUTO: 4 10E6/UL (ref 4.4–5.9)
SODIUM SERPL-SCNC: 124 MMOL/L (ref 135–145)
SODIUM SERPL-SCNC: 126 MMOL/L (ref 135–145)
SODIUM UR-SCNC: 30 MMOL/L
WBC # BLD AUTO: 13 10E3/UL (ref 4–11)

## 2024-01-14 PROCEDURE — 0F7D8DZ DILATION OF PANCREATIC DUCT WITH INTRALUMINAL DEVICE, VIA NATURAL OR ARTIFICIAL OPENING ENDOSCOPIC: ICD-10-PCS | Performed by: INTERNAL MEDICINE

## 2024-01-14 PROCEDURE — 82565 ASSAY OF CREATININE: CPT | Performed by: INTERNAL MEDICINE

## 2024-01-14 PROCEDURE — 710N000009 HC RECOVERY PHASE 1, LEVEL 1, PER MIN: Performed by: INTERNAL MEDICINE

## 2024-01-14 PROCEDURE — 250N000011 HC RX IP 250 OP 636: Performed by: INTERNAL MEDICINE

## 2024-01-14 PROCEDURE — 80307 DRUG TEST PRSMV CHEM ANLYZR: CPT | Performed by: PAIN MEDICINE

## 2024-01-14 PROCEDURE — 250N000013 HC RX MED GY IP 250 OP 250 PS 637: Performed by: HOSPITALIST

## 2024-01-14 PROCEDURE — 84300 ASSAY OF URINE SODIUM: CPT | Performed by: HOSPITALIST

## 2024-01-14 PROCEDURE — 0FC98ZZ EXTIRPATION OF MATTER FROM COMMON BILE DUCT, VIA NATURAL OR ARTIFICIAL OPENING ENDOSCOPIC: ICD-10-PCS | Performed by: INTERNAL MEDICINE

## 2024-01-14 PROCEDURE — 84295 ASSAY OF SERUM SODIUM: CPT | Performed by: INTERNAL MEDICINE

## 2024-01-14 PROCEDURE — 83735 ASSAY OF MAGNESIUM: CPT | Performed by: INTERNAL MEDICINE

## 2024-01-14 PROCEDURE — 85018 HEMOGLOBIN: CPT | Performed by: INTERNAL MEDICINE

## 2024-01-14 PROCEDURE — 85027 COMPLETE CBC AUTOMATED: CPT | Performed by: INTERNAL MEDICINE

## 2024-01-14 PROCEDURE — 272N000001 HC OR GENERAL SUPPLY STERILE: Performed by: INTERNAL MEDICINE

## 2024-01-14 PROCEDURE — 250N000011 HC RX IP 250 OP 636: Performed by: NURSE ANESTHETIST, CERTIFIED REGISTERED

## 2024-01-14 PROCEDURE — C9113 INJ PANTOPRAZOLE SODIUM, VIA: HCPCS | Performed by: INTERNAL MEDICINE

## 2024-01-14 PROCEDURE — 99233 SBSQ HOSP IP/OBS HIGH 50: CPT | Performed by: HOSPITALIST

## 2024-01-14 PROCEDURE — 250N000011 HC RX IP 250 OP 636

## 2024-01-14 PROCEDURE — 999N000141 HC STATISTIC PRE-PROCEDURE NURSING ASSESSMENT: Performed by: INTERNAL MEDICINE

## 2024-01-14 PROCEDURE — 360N000083 HC SURGERY LEVEL 3 W/ FLUORO, PER MIN: Performed by: INTERNAL MEDICINE

## 2024-01-14 PROCEDURE — 250N000009 HC RX 250: Performed by: NURSE ANESTHETIST, CERTIFIED REGISTERED

## 2024-01-14 PROCEDURE — 74330 X-RAY BILE/PANC ENDOSCOPY: CPT | Mod: TC

## 2024-01-14 PROCEDURE — 258N000003 HC RX IP 258 OP 636: Performed by: INTERNAL MEDICINE

## 2024-01-14 PROCEDURE — C1769 GUIDE WIRE: HCPCS | Performed by: INTERNAL MEDICINE

## 2024-01-14 PROCEDURE — 120N000013 HC R&B IMCU

## 2024-01-14 PROCEDURE — C2617 STENT, NON-COR, TEM W/O DEL: HCPCS | Performed by: INTERNAL MEDICINE

## 2024-01-14 PROCEDURE — 0F798DZ DILATION OF COMMON BILE DUCT WITH INTRALUMINAL DEVICE, VIA NATURAL OR ARTIFICIAL OPENING ENDOSCOPIC: ICD-10-PCS | Performed by: INTERNAL MEDICINE

## 2024-01-14 PROCEDURE — 370N000017 HC ANESTHESIA TECHNICAL FEE, PER MIN: Performed by: INTERNAL MEDICINE

## 2024-01-14 PROCEDURE — 258N000003 HC RX IP 258 OP 636: Performed by: NURSE ANESTHETIST, CERTIFIED REGISTERED

## 2024-01-14 PROCEDURE — 36415 COLL VENOUS BLD VENIPUNCTURE: CPT | Performed by: INTERNAL MEDICINE

## 2024-01-14 PROCEDURE — 99254 IP/OBS CNSLTJ NEW/EST MOD 60: CPT | Performed by: INTERNAL MEDICINE

## 2024-01-14 PROCEDURE — 83935 ASSAY OF URINE OSMOLALITY: CPT | Performed by: HOSPITALIST

## 2024-01-14 DEVICE — STENT GEENEN PANCREA SOF-FLEX 5FRX3CM G49668 GPSOS-SF-5-3: Type: IMPLANTABLE DEVICE | Site: PANCREATIC DUCT | Status: FUNCTIONAL

## 2024-01-14 DEVICE — IMPLANTABLE DEVICE: Type: IMPLANTABLE DEVICE | Site: PANCREATIC DUCT | Status: FUNCTIONAL

## 2024-01-14 RX ORDER — HYDROMORPHONE HCL IN WATER/PF 6 MG/30 ML
0.4 PATIENT CONTROLLED ANALGESIA SYRINGE INTRAVENOUS EVERY 5 MIN PRN
Status: DISCONTINUED | OUTPATIENT
Start: 2024-01-14 | End: 2024-01-14 | Stop reason: HOSPADM

## 2024-01-14 RX ORDER — SODIUM CHLORIDE 9 MG/ML
INJECTION, SOLUTION INTRAVENOUS CONTINUOUS PRN
Status: DISCONTINUED | OUTPATIENT
Start: 2024-01-14 | End: 2024-01-14

## 2024-01-14 RX ORDER — FENTANYL CITRATE 0.05 MG/ML
25 INJECTION, SOLUTION INTRAMUSCULAR; INTRAVENOUS EVERY 5 MIN PRN
Status: DISCONTINUED | OUTPATIENT
Start: 2024-01-14 | End: 2024-01-14 | Stop reason: HOSPADM

## 2024-01-14 RX ORDER — LANOLIN ALCOHOL/MO/W.PET/CERES
3 CREAM (GRAM) TOPICAL
Status: DISCONTINUED | OUTPATIENT
Start: 2024-01-14 | End: 2024-01-26 | Stop reason: HOSPADM

## 2024-01-14 RX ORDER — ONDANSETRON 2 MG/ML
INJECTION INTRAMUSCULAR; INTRAVENOUS PRN
Status: DISCONTINUED | OUTPATIENT
Start: 2024-01-14 | End: 2024-01-14

## 2024-01-14 RX ORDER — SODIUM CHLORIDE, SODIUM LACTATE, POTASSIUM CHLORIDE, CALCIUM CHLORIDE 600; 310; 30; 20 MG/100ML; MG/100ML; MG/100ML; MG/100ML
INJECTION, SOLUTION INTRAVENOUS CONTINUOUS
Status: DISCONTINUED | OUTPATIENT
Start: 2024-01-14 | End: 2024-01-14 | Stop reason: HOSPADM

## 2024-01-14 RX ORDER — FLUMAZENIL 0.1 MG/ML
0.2 INJECTION, SOLUTION INTRAVENOUS
Status: ACTIVE | OUTPATIENT
Start: 2024-01-14 | End: 2024-01-15

## 2024-01-14 RX ORDER — LABETALOL HYDROCHLORIDE 5 MG/ML
5 INJECTION, SOLUTION INTRAVENOUS
Status: DISCONTINUED | OUTPATIENT
Start: 2024-01-14 | End: 2024-01-14 | Stop reason: HOSPADM

## 2024-01-14 RX ORDER — KETAMINE HYDROCHLORIDE 10 MG/ML
INJECTION INTRAMUSCULAR; INTRAVENOUS PRN
Status: DISCONTINUED | OUTPATIENT
Start: 2024-01-14 | End: 2024-01-14

## 2024-01-14 RX ORDER — NALOXONE HYDROCHLORIDE 0.4 MG/ML
0.4 INJECTION, SOLUTION INTRAMUSCULAR; INTRAVENOUS; SUBCUTANEOUS
Status: DISCONTINUED | OUTPATIENT
Start: 2024-01-14 | End: 2024-01-26 | Stop reason: HOSPADM

## 2024-01-14 RX ORDER — SODIUM CHLORIDE, SODIUM LACTATE, POTASSIUM CHLORIDE, CALCIUM CHLORIDE 600; 310; 30; 20 MG/100ML; MG/100ML; MG/100ML; MG/100ML
INJECTION, SOLUTION INTRAVENOUS CONTINUOUS PRN
Status: DISCONTINUED | OUTPATIENT
Start: 2024-01-14 | End: 2024-01-14

## 2024-01-14 RX ORDER — ONDANSETRON 4 MG/1
4 TABLET, ORALLY DISINTEGRATING ORAL EVERY 6 HOURS PRN
Status: DISCONTINUED | OUTPATIENT
Start: 2024-01-14 | End: 2024-01-15

## 2024-01-14 RX ORDER — NICOTINE 21 MG/24HR
1 PATCH, TRANSDERMAL 24 HOURS TRANSDERMAL DAILY
Status: DISCONTINUED | OUTPATIENT
Start: 2024-01-14 | End: 2024-01-26 | Stop reason: HOSPADM

## 2024-01-14 RX ORDER — MEPERIDINE HYDROCHLORIDE 25 MG/ML
12.5 INJECTION INTRAMUSCULAR; INTRAVENOUS; SUBCUTANEOUS EVERY 5 MIN PRN
Status: DISCONTINUED | OUTPATIENT
Start: 2024-01-14 | End: 2024-01-14 | Stop reason: HOSPADM

## 2024-01-14 RX ORDER — NALOXONE HYDROCHLORIDE 0.4 MG/ML
0.2 INJECTION, SOLUTION INTRAMUSCULAR; INTRAVENOUS; SUBCUTANEOUS
Status: DISCONTINUED | OUTPATIENT
Start: 2024-01-14 | End: 2024-01-26 | Stop reason: HOSPADM

## 2024-01-14 RX ORDER — ONDANSETRON 2 MG/ML
4 INJECTION INTRAMUSCULAR; INTRAVENOUS EVERY 6 HOURS PRN
Status: DISCONTINUED | OUTPATIENT
Start: 2024-01-14 | End: 2024-01-15

## 2024-01-14 RX ORDER — DEXAMETHASONE SODIUM PHOSPHATE 4 MG/ML
INJECTION, SOLUTION INTRA-ARTICULAR; INTRALESIONAL; INTRAMUSCULAR; INTRAVENOUS; SOFT TISSUE PRN
Status: DISCONTINUED | OUTPATIENT
Start: 2024-01-14 | End: 2024-01-14

## 2024-01-14 RX ORDER — PROPOFOL 10 MG/ML
INJECTION, EMULSION INTRAVENOUS CONTINUOUS PRN
Status: DISCONTINUED | OUTPATIENT
Start: 2024-01-14 | End: 2024-01-14

## 2024-01-14 RX ORDER — PIPERACILLIN SODIUM, TAZOBACTAM SODIUM 2; .25 G/10ML; G/10ML
2.25 INJECTION, POWDER, LYOPHILIZED, FOR SOLUTION INTRAVENOUS EVERY 6 HOURS
Status: DISCONTINUED | OUTPATIENT
Start: 2024-01-14 | End: 2024-01-15

## 2024-01-14 RX ORDER — FENTANYL CITRATE 50 UG/ML
INJECTION, SOLUTION INTRAMUSCULAR; INTRAVENOUS PRN
Status: DISCONTINUED | OUTPATIENT
Start: 2024-01-14 | End: 2024-01-14

## 2024-01-14 RX ORDER — FENTANYL CITRATE 0.05 MG/ML
50 INJECTION, SOLUTION INTRAMUSCULAR; INTRAVENOUS EVERY 5 MIN PRN
Status: DISCONTINUED | OUTPATIENT
Start: 2024-01-14 | End: 2024-01-14 | Stop reason: HOSPADM

## 2024-01-14 RX ORDER — ONDANSETRON 4 MG/1
4 TABLET, ORALLY DISINTEGRATING ORAL EVERY 30 MIN PRN
Status: DISCONTINUED | OUTPATIENT
Start: 2024-01-14 | End: 2024-01-14 | Stop reason: HOSPADM

## 2024-01-14 RX ORDER — ONDANSETRON 2 MG/ML
4 INJECTION INTRAMUSCULAR; INTRAVENOUS EVERY 30 MIN PRN
Status: DISCONTINUED | OUTPATIENT
Start: 2024-01-14 | End: 2024-01-14 | Stop reason: HOSPADM

## 2024-01-14 RX ORDER — HYDRALAZINE HYDROCHLORIDE 20 MG/ML
2.5-5 INJECTION INTRAMUSCULAR; INTRAVENOUS
Status: DISCONTINUED | OUTPATIENT
Start: 2024-01-14 | End: 2024-01-14 | Stop reason: HOSPADM

## 2024-01-14 RX ORDER — HYDROMORPHONE HCL IN WATER/PF 6 MG/30 ML
0.2 PATIENT CONTROLLED ANALGESIA SYRINGE INTRAVENOUS EVERY 5 MIN PRN
Status: DISCONTINUED | OUTPATIENT
Start: 2024-01-14 | End: 2024-01-14 | Stop reason: HOSPADM

## 2024-01-14 RX ADMIN — MELATONIN TAB 3 MG 3 MG: 3 TAB at 22:13

## 2024-01-14 RX ADMIN — SODIUM CHLORIDE, POTASSIUM CHLORIDE, SODIUM LACTATE AND CALCIUM CHLORIDE 1000 ML: 600; 310; 30; 20 INJECTION, SOLUTION INTRAVENOUS at 12:52

## 2024-01-14 RX ADMIN — PROPOFOL 150 MCG/KG/MIN: 10 INJECTION, EMULSION INTRAVENOUS at 09:53

## 2024-01-14 RX ADMIN — PIPERACILLIN AND TAZOBACTAM 3.38 G: 3; .375 INJECTION, POWDER, FOR SOLUTION INTRAVENOUS at 03:27

## 2024-01-14 RX ADMIN — ONDANSETRON 4 MG: 2 INJECTION INTRAMUSCULAR; INTRAVENOUS at 10:06

## 2024-01-14 RX ADMIN — PIPERACILLIN AND TAZOBACTAM 2.25 G: 2; .25 INJECTION, POWDER, FOR SOLUTION INTRAVENOUS at 09:17

## 2024-01-14 RX ADMIN — PIPERACILLIN AND TAZOBACTAM 2.25 G: 2; .25 INJECTION, POWDER, FOR SOLUTION INTRAVENOUS at 15:50

## 2024-01-14 RX ADMIN — SODIUM CHLORIDE, POTASSIUM CHLORIDE, SODIUM LACTATE AND CALCIUM CHLORIDE 150 ML: 600; 310; 30; 20 INJECTION, SOLUTION INTRAVENOUS at 14:23

## 2024-01-14 RX ADMIN — HYDROMORPHONE HYDROCHLORIDE 0.5 MG: 1 INJECTION, SOLUTION INTRAMUSCULAR; INTRAVENOUS; SUBCUTANEOUS at 07:31

## 2024-01-14 RX ADMIN — MELATONIN TAB 3 MG 3 MG: 3 TAB at 22:11

## 2024-01-14 RX ADMIN — SODIUM CHLORIDE, POTASSIUM CHLORIDE, SODIUM LACTATE AND CALCIUM CHLORIDE: 600; 310; 30; 20 INJECTION, SOLUTION INTRAVENOUS at 07:32

## 2024-01-14 RX ADMIN — FENTANYL CITRATE 50 MCG: 50 INJECTION INTRAMUSCULAR; INTRAVENOUS at 09:56

## 2024-01-14 RX ADMIN — SODIUM CHLORIDE: 9 INJECTION, SOLUTION INTRAVENOUS at 09:45

## 2024-01-14 RX ADMIN — HYDROMORPHONE HYDROCHLORIDE 0.5 MG: 1 INJECTION, SOLUTION INTRAMUSCULAR; INTRAVENOUS; SUBCUTANEOUS at 15:46

## 2024-01-14 RX ADMIN — MIDAZOLAM 2 MG: 1 INJECTION INTRAMUSCULAR; INTRAVENOUS at 09:48

## 2024-01-14 RX ADMIN — DEXAMETHASONE SODIUM PHOSPHATE 4 MG: 4 INJECTION, SOLUTION INTRA-ARTICULAR; INTRALESIONAL; INTRAMUSCULAR; INTRAVENOUS; SOFT TISSUE at 10:04

## 2024-01-14 RX ADMIN — HYDROMORPHONE HYDROCHLORIDE 0.5 MG: 1 INJECTION, SOLUTION INTRAMUSCULAR; INTRAVENOUS; SUBCUTANEOUS at 20:05

## 2024-01-14 RX ADMIN — PANTOPRAZOLE SODIUM 40 MG: 40 INJECTION, POWDER, FOR SOLUTION INTRAVENOUS at 07:31

## 2024-01-14 RX ADMIN — PANTOPRAZOLE SODIUM 40 MG: 40 INJECTION, POWDER, FOR SOLUTION INTRAVENOUS at 20:06

## 2024-01-14 RX ADMIN — Medication 10 MG: at 10:06

## 2024-01-14 RX ADMIN — PIPERACILLIN AND TAZOBACTAM 2.25 G: 2; .25 INJECTION, POWDER, FOR SOLUTION INTRAVENOUS at 22:11

## 2024-01-14 RX ADMIN — HYDROMORPHONE HYDROCHLORIDE 0.5 MG: 1 INJECTION, SOLUTION INTRAMUSCULAR; INTRAVENOUS; SUBCUTANEOUS at 22:10

## 2024-01-14 RX ADMIN — Medication 20 MG: at 09:54

## 2024-01-14 RX ADMIN — HYDROMORPHONE HYDROCHLORIDE 0.5 MG: 1 INJECTION, SOLUTION INTRAMUSCULAR; INTRAVENOUS; SUBCUTANEOUS at 17:56

## 2024-01-14 ASSESSMENT — ACTIVITIES OF DAILY LIVING (ADL)
ADLS_ACUITY_SCORE: 20
ADLS_ACUITY_SCORE: 22
ADLS_ACUITY_SCORE: 21
ADLS_ACUITY_SCORE: 20
ADLS_ACUITY_SCORE: 21
ADLS_ACUITY_SCORE: 22
ADLS_ACUITY_SCORE: 23
ADLS_ACUITY_SCORE: 21

## 2024-01-14 ASSESSMENT — ENCOUNTER SYMPTOMS
DYSRHYTHMIAS: 0
SEIZURES: 0

## 2024-01-14 ASSESSMENT — COPD QUESTIONNAIRES: COPD: 0

## 2024-01-14 ASSESSMENT — LIFESTYLE VARIABLES: TOBACCO_USE: 0

## 2024-01-14 NOTE — PLAN OF CARE
Goal Outcome Evaluation:      Plan of Care Reviewed With: patient    Overall Patient Progress: no changeOverall Patient Progress: no change       Date & Time: 2050 to 2250 on 1/13/2023   Behavior & Aggression: Green  Fall Risk: No  Orientation: A&Ox4  ABNL VS/O2: VSS on RA except tachycardia. Tele: Sinus Tachycardia.  ABNL Labs: Magnesium: 1.3 (to be replaced- awaiting pharmacy verification) & Sodium: 124.   Pain Management: Morphine x1 given while in ED. Declines need for further pain medications.   Bowel/Bladder: Adequate voiding. Abdomen rounded & distended- reported tenderness with palpation. BS hypoactive- not passing gas.   Skin: Skin color jaundiced & scleras yellow.   IV: LR at 150 mL/hr with int abx  Diet: NPO except for ice chips & sips with medications. Denies N/V.   Activity Level: Independent  Tests/Procedures: MRCP completed- see results.  Anticipated  DC Date: Pending  Significant Information: Surgical interventions pending. [place] [facility] [place] belongings remain with patient.

## 2024-01-14 NOTE — ANESTHESIA POSTPROCEDURE EVALUATION
Patient: Talha Alexander    Procedure: Procedure(s):  Endoscopic retrograde cholangiopancreatogram with stent placement       Anesthesia Type:  MAC    Note:     Postop Pain Control: Uneventful            Sign Out: Well controlled pain   PONV:    Neuro/Psych: Uneventful            Sign Out: Acceptable/Baseline neuro status   Airway/Respiratory: Uneventful            Sign Out: Acceptable/Baseline resp. status   CV/Hemodynamics: Uneventful            Sign Out: Acceptable CV status; No obvious hypovolemia; No obvious fluid overload   Other NRE:    DID A NON-ROUTINE EVENT OCCUR?            Last vitals:  Vitals Value Taken Time   /89 01/14/24 1115   Temp 36.7  C (98  F) 01/14/24 1111   Pulse 102 01/14/24 1127   Resp 20 01/14/24 1127   SpO2 97 % 01/14/24 1127   Vitals shown include unfiled device data.    Electronically Signed By: Keenan Heaton MD  January 14, 2024  11:28 AM

## 2024-01-14 NOTE — ANESTHESIA PREPROCEDURE EVALUATION
Anesthesia Pre-Procedure Evaluation    Patient: Talha Alexander   MRN: [id number] : 1975        Procedure : Procedure(s):  Endoscopic retrograde cholangiopancreatogram          No past medical history on file.   No past surgical history on file.   Allergies   Allergen Reactions    Peanut-Containing Drug Products     Tree Nuts [Nuts]       Social History     Tobacco Use    Smoking status: Not on file    Smokeless tobacco: Not on file   Substance Use Topics    Alcohol use: Not on file      Wt Readings from Last 1 Encounters:   24 97.5 kg (215 lb)        Anesthesia Evaluation            ROS/MED HX  ENT/Pulmonary:    (-) tobacco use, asthma, COPD and sleep apnea   Neurologic:    (-) no seizures and no CVA   Cardiovascular:    (-) hypertension, CAD, CHF and arrhythmias   METS/Exercise Tolerance:     Hematologic:       Musculoskeletal:       GI/Hepatic: Comment: GS pancreatitis    (+)          cholecystitis/cholelithiasis,       (-) GERD and liver disease   Renal/Genitourinary:     (+) renal disease (BRENDA),             Endo:    (-) Type I DM and Type II DM   Psychiatric/Substance Use:       Infectious Disease:       Malignancy:       Other: Comment: Hyponatremia           Physical Exam    Airway        Mallampati: II   TM distance: > 3 FB   Neck ROM: full   Mouth opening: > 3 cm    Respiratory Devices and Support         Dental  no notable dental history     (+) Minor Abnormalities - some fillings, tiny chips      Cardiovascular          Rhythm and rate: regular     Pulmonary           breath sounds clear to auscultation           OUTSIDE LABS:  CBC:   Lab Results   Component Value Date    WBC 13.0 (H) 2024    WBC 15.9 (H) 2024    HGB 11.8 (L) 2024    HGB 14.3 2024    HCT 33.5 (L) 2024    HCT 40.5 2024     2024     2024     BMP:   Lab Results   Component Value Date     (L) 2024     (L) 2024    POTASSIUM 4.6 2024     "POTASSIUM 4.4 01/13/2024    CHLORIDE 92 (L) 01/14/2024    CHLORIDE 87 (L) 01/13/2024    CO2 16 (L) 01/14/2024    CO2 22 01/13/2024    BUN 75.9 (H) 01/14/2024    BUN 68.4 (H) 01/13/2024    CR 3.27 (H) 01/14/2024    CR 3.06 (H) 01/14/2024     (H) 01/14/2024     (H) 01/13/2024     COAGS: No results found for: \"PTT\", \"INR\", \"FIBR\"  POC: No results found for: \"BGM\", \"HCG\", \"HCGS\"  HEPATIC:   Lab Results   Component Value Date    ALBUMIN 2.7 (L) 01/14/2024    PROTTOTAL 5.7 (L) 01/14/2024    ALT 81 (H) 01/14/2024     (H) 01/14/2024    ALKPHOS 79 01/14/2024    BILITOTAL 3.7 (H) 01/14/2024    RIAN 14 (L) 01/13/2024     OTHER:   Lab Results   Component Value Date    LACT 1.6 01/13/2024    LUBNA 6.6 (L) 01/14/2024    MAG 2.5 (H) 01/14/2024    LIPASE 776 (H) 01/13/2024       Anesthesia Plan    ASA Status:  3       Anesthesia Type: MAC.              Consents    Anesthesia Plan(s) and associated risks, benefits, and realistic alternatives discussed. Questions answered and patient/representative(s) expressed understanding.     - Discussed:     - Discussed with:  Patient            Postoperative Care    Pain management: Multi-modal analgesia.   PONV prophylaxis: Ondansetron (or other 5HT-3)     Comments:               Keenan Heaton MD    I have reviewed the pertinent notes and labs in the chart from the past 30 days and (re)examined the patient.  Any updates or changes from those notes are reflected in this note.      "

## 2024-01-14 NOTE — PROGRESS NOTES
GI PROGRESS NOTE    S: pt reports abdominal discomfort but not pain this morning.  Opiod requirements are minimal.  Slept better last night.    O:BP (!) 147/84 (BP Location: Right arm, Patient Position: Supine, Cuff Size: Adult Regular)   Pulse 98   Temp 98  F (36.7  C) (Oral)   Resp 18   Ht 1.829 m (6')   Wt 97.5 kg (215 lb)   SpO2 94%   BMI 29.16 kg/m    GEN: NAD  HEENT: +scleral icterus  CV: RRR  PULM: CTAB  ABD: soft, distended. BS are hypoactive.  Appears less tender than yesterday but still with tenderness throughout.  EXT: no edema    Labs: notable for worsening creatinine.  Hgb has dropped to 11.8.  BUN remains elevated.  LFTs are downtrending.      I/O: sharted as 1100/750.    Imaging:   MRCP 1/13/24    IMPRESSION:     1.  At least 2 choledocholiths within the distal common bile duct measuring up to 3 mm with mild upstream biliary ductal dilation.     2.  Acute hemorrhagic pancreatitis with extensive peripancreatic inflammation and fluid as described. No organized peripancreatic fluid collections. No pancreatic ductal dilation. This noncontrast exam cannot evaluate for parenchymal necrosis.     3.  Cholelithiasis, without evidence of acute cholecystitis.     4.  Reactive edema within the proximal duodenum related to the pancreatitis. A mildly dilated left upper quadrant jejunal loop is also likely a focal ileus related to the pancreatitis.     5.  Small amount of ascites.     6.  Trace left pleural effusion with adjacent atelectasis.      A/P: 47 yo M with moderate/severe gallstone pancreatitis with evidence of obstructing stones and hemorrhagic pancreatitis.  Overnight he clinically appears stable and even slightly improved but his labs are worrisome for worsening kidney dysfunction with anemia.      -pt is on the schedule for ERCP today.  -continue aggressive fluid resuscitation with LR.  I will order another 1L bolus of LR. Continue LR at 150mL/hr after as ordered.  -verbal order provided to  nursing staff to place Cornejo and monitor urine ouptut and bladder pressures.  -recommend surgery consult.  -continue antibiotics.  -trend hgb. Hemorrhagic pancreatitis could be from necrosis that is not seen on non-contrast imaging.  Consider formal IR consult to rule out any pancreatic pseudoaneurysm.  -Consult renal consult given worsening kidney dysfunction and potential need for contrast imaging.      Indira Mccloud MD  [facility]  [phone]

## 2024-01-14 NOTE — CONSULTS
[facility]    Nephrology Consultation     Date of Admission:  1/13/2024    Assessment & Plan     Talha Alexander is a 48 year old male who was admitted on 1/13/2024.     1) BRENDA:  Presume prerenal at this point but ATN is not excluded.  We often see improvement after the common duct is cleared in these cases.  FENA ordered.      2) Hyponatremia: U osm and U na ordered.  Suspect depletional hyponatremia but need studies to bear this out.     3) Metabolic acidosis.  Bicarb has fallen from 22 to 16.  Will follow.  He is on LR.      4) Gallstone pancreatitis.      5) Hypomagnesemia - replaced.    Suggest:    FENA and U osm ordered.  Cont IV fluid    Following.      Ok Choudhary MD  [facility]phrology  [phone]    Reason for Consult     I was asked to see the patient for BRENDA, hyponatremia.  .    Primary Care Physician     Physician No Ref-Primary    Chief Complaint     BRENDA    History is obtained from the patient and chart review.      History of Present Illness     Talha Alexander is a 48 year old male who presents with BRENDA.    He was admitted 1/13/241 after presenting with abdominal pain.  He had been ill since Wednesday 1/10/24.      He has been found to have gallstone pancreatitis.  He also has been found to have choledocholithiasis.    He underwent ERCP earlier today.  So his common duct is clear.      His cr on presentation was 2.5 rising to 3.27 this AM.    We do not know his baseline cr.      Na was 122 increasing to 124.     He feels tired as he has not slept.    He has some back pain.    And continued abd pain.      He did take some ibuprofen over the last several days but does not as a rule.        Past Medical History   No PMH  He has not been to doctors.      Past Surgical History   I have reviewed this patient's surgical history and updated it with pertinent information if needed.  History reviewed. No pertinent surgical history.    Prior to Admission  Medications   Prior to Admission Medications   Prescriptions Last Dose Informant Patient Reported? Taking?   ibuprofen (ADVIL/MOTRIN) 200 MG tablet  at PRN Self Yes Yes   Sig: Take 400 mg by mouth every 6 hours as needed for pain   naproxen sodium (ANAPROX) 220 MG tablet  at PRN Self Yes Yes   Sig: Take 220 mg by mouth every 12 hours as needed for moderate pain      Facility-Administered Medications: None     Allergies   Allergies   Allergen Reactions    Peanut-Containing Drug Products     Tree Nuts [Nuts]        Social History   I have reviewed this patient's social history and updated it with pertinent information if needed. Talha RODNEY Alexander      Family History   I have reviewed this patient's family history and updated it with pertinent information if needed.   History reviewed. No pertinent family history.    Review of Systems   The 10 point Review of Systems is negative other than noted in the HPI.     Physical Exam   Temp: 98  F (36.7  C) Temp src: Temporal BP: 136/80 Pulse: 98   Resp: 22 SpO2: 96 % O2 Device: None (Room air)    Vital Signs with Ranges  Temp:  [98  F (36.7  C)-98.5  F (36.9  C)] 98  F (36.7  C)  Pulse:  [] 98  Resp:  [12-23] 22  BP: (110-154)/(78-95) 136/80  SpO2:  [93 %-98 %] 96 %  215 lbs 0 oz    GENERAL: alert, NAD, sitting in a chair  HEENT:  Normocephalic. No gross abnormalities.  Pupils equal.  MMM.  Dentition is ok.  CV: RRR, no murmurs, no clicks, gallops, or rubs, no edema, no carotid bruits  RESP: Clear bilaterally with good efforts  GI: Abdomen mildl tenderness in mid epigastrium  MUSCULOSKELETAL: extremities nl - no gross deformities noted  SKIN: no suspicious lesions or rashes, dry to touch  NEURO:  Strength normal and symmetric.   PSYCH: mood and affect appropriate  LYMPH: No palpable ant/post cervical and supraclavicular adenopathy    Data   BMP  Recent Labs   Lab 01/14/24  1304 01/14/24  0546 01/14/24  0020 01/13/24  2255 01/13/24  2146 01/13/24  1735 01/13/24  1333   NA  124* 124* 124*  --   --  124* 122*   POTASSIUM  --  4.6  --   --   --   --  4.4   CHLORIDE  --  92*  --   --   --   --  87*   LUBNA  --  6.6*  --   --   --   --  7.2*   CO2  --  16*  --   --   --   --  22   BUN  --  75.9*  --   --   --   --  68.4*   CR  --  3.27* 3.06*  --   --   --  2.50*   GLC  --  114*  --  111* 102*  --  141*     [email or web address](phos:4)  CBC)  Recent Labs   Lab 01/14/24  0546 01/13/24  1333   WBC 13.0* 15.9*   HGB 11.8* 14.3   HCT 33.5* 40.5   MCV 84 84    227     Recent Labs   Lab 01/14/24  0546 01/13/24  1333   * 133*   ALT 81* 112*   ALKPHOS 79 106   BILITOTAL 3.7* 6.1*   RIAN  --  14*

## 2024-01-14 NOTE — PROGRESS NOTES
[id number]    [facility] [place] Status  Orientation: A&Ox4  Vitals/Pain: VSS on RA except Tachycardia. Pt reporting abdominal pain. Pt refuses any medication, expressing pain is tolerable.  Tele: ST  Lines/Drains: 2 PIVs: 1 PIV infusing with LR x 150 mL/hr, 1 PIV on SL  LS: clear  GI/: on NPO but may have ice chips. BS hypoactive, x0 BM this shift. Pt having adequate urine output throughout shift. Up to bathroom x 4.  Labs: serum Na q 4h  Ambulation/Assist: SB-assist  Plan: For scheduled ERCP today at 08:30 am.

## 2024-01-14 NOTE — PROGRESS NOTES
General Surgery    Routine consult received.  Full consult to follow, likely tomorrow    Will consider laparoscopic cholecystectomy to prevent recurrence of his gallstone pancreatitis if metabolic derangements from his pancreatitis resolve which they now should given that his common bile duct is decompressed.    Please contact me through the [employer] garcía if there are more urgent concerns regarding this patient.     Leno Fischer M.D., F.A.C.S.  [facility]  [facility]  [place], [place]

## 2024-01-14 NOTE — ANESTHESIA CARE TRANSFER NOTE
Patient: Talha Alexander    Procedure: Procedure(s):  Endoscopic retrograde cholangiopancreatogram with stent placement       Diagnosis: Bile duct obstruction (H28) [K83.1]  Diagnosis Additional Information: No value filed.    Anesthesia Type:   MAC     Note:    Oropharynx: oropharynx clear of all foreign objects  Level of Consciousness: awake  Oxygen Supplementation: nasal cannula  Level of Supplemental Oxygen (L/min / FiO2): 4  Independent Airway: airway patency satisfactory and stable  Dentition: dentition unchanged  Vital Signs Stable: post-procedure vital signs reviewed and stable    Patient transferred to: PACU    Handoff Report: Identifed the Patient, Identified the Reponsible Provider, Reviewed the pertinent medical history, Discussed the surgical course, Reviewed Intra-OP anesthesia mangement and issues during anesthesia, Set expectations for post-procedure period and Allowed opportunity for questions and acknowledgement of understanding      Vitals:  Vitals Value Taken Time   /95 01/14/24 1130   Temp 36.7  C (98  F) 01/14/24 1111   Pulse 103 01/14/24 1132   Resp 13 01/14/24 1132   SpO2 96 % 01/14/24 1132   Vitals shown include unfiled device data.    Electronically Signed By: MORENA Martinez CRNA  January 14, 2024  11:33 AM

## 2024-01-14 NOTE — PLAN OF CARE
Goal Outcome Evaluation:      Plan of Care Reviewed With: patient, significant other    Overall Patient Progress: improving    Orientations: alert and oriented  Vitals/Pain: afebrile, tachycardic at times, denies chest pain, on RA; 2-4/10 abdominal pain, managed with PRN IV Dilaudid  Tele: Sinus tachy  Lines/Drains: had 1L bolus of LR post ERCP, currently at 150 ml/hr; intermittent abx  Skin/Wounds: mild jaundice, still with abdominal distention  GI/: Cornejo was placed in the pre-op for ERCP, draining straw-colored urine; had a small pale stool this afternoon  Labs: Abnormal/Trends, Electrolyte Replacement- S. Na remains constantly low at 124   Ambulation/Assist: Standby assist, gait is steady, denies dizziness/lightheadedness  Sleep Quality: short intervals  Plan: Surgery consult

## 2024-01-14 NOTE — ED NOTES
[facility]  ED Nurse Handoff Report    ED Chief complaint: Abdominal Pain      ED Diagnosis:   Final diagnoses:   Gallstone pancreatitis   Elevated LFTs   Acute biliary pancreatitis, unspecified complication status   BRENDA (acute kidney injury) (H24)       Code Status: Not addressed in ED    Allergies:   Allergies   Allergen Reactions    Peanut-Containing Drug Products     Tree Nuts [Nuts]        Patient Story: abd pain and distention for four days, jaundice   Focused Assessment:    Labs Ordered and Resulted from Time of ED Arrival to Time of ED Departure   COMPREHENSIVE METABOLIC PANEL - Abnormal       Result Value    Sodium 122 (*)     Potassium 4.4      Carbon Dioxide (CO2) 22      Anion Gap 13      Urea Nitrogen 68.4 (*)     Creatinine 2.50 (*)     GFR Estimate 31 (*)     Calcium 7.2 (*)     Chloride 87 (*)     Glucose 141 (*)     Alkaline Phosphatase 106       (*)      (*)     Protein Total 6.6      Albumin 3.4 (*)     Bilirubin Total 6.1 (*)    LIPASE - Abnormal    Lipase 776 (*)    AMMONIA - Abnormal    Ammonia 14 (*)    CBC WITH PLATELETS AND DIFFERENTIAL - Abnormal    WBC Count 15.9 (*)     RBC Count 4.84      Hemoglobin 14.3      Hematocrit 40.5      MCV 84      MCH 29.5      MCHC 35.3      RDW 14.8      Platelet Count 227      % Neutrophils 85      % Lymphocytes 4      % Monocytes 10      % Eosinophils 0      % Basophils 0      % Immature Granulocytes 1      NRBCs per 100 WBC 0      Absolute Neutrophils 13.3 (*)     Absolute Lymphocytes 0.7 (*)     Absolute Monocytes 1.7 (*)     Absolute Eosinophils 0.1      Absolute Basophils 0.1      Absolute Immature Granulocytes 0.1      Absolute NRBCs 0.0     SODIUM - Abnormal    Sodium 124 (*)    LACTIC ACID WHOLE BLOOD - Normal    Lactic Acid 1.6     TRIGLYCERIDES   IONIZED CALCIUM   BLOOD CULTURE   BLOOD CULTURE     US Abdomen Limited   Final Result   IMPRESSION:   1.  Cholelithiasis.   2.  Mildly heterogeneous liver with mild hepatic  steatosis.   3.  Small ascites.   4.  Known pancreatitis was better assessed on the CT earlier today.            CT Abdomen Pelvis w/o Contrast   Final Result   IMPRESSION:       1.  Acute pancreatitis with extensive peripancreatic inflammation as described. No organized peripancreatic fluid collections. This noncontrast exam cannot evaluate for parenchymal necrosis.      2.  Cholelithiasis. No biliary ductal dilation.      3.  Reactive wall thickening of the duodenum related to the pancreatitis.      4.  Small amount of ascites.      5.  Trace left pleural effusion with adjacent atelectasis.      MR Abdomen MRCP without Contrast    (Results Pending)         Treatments and/or interventions provided: 2000mL NS bolus, zosyn, zofran, morphine  Patient's response to treatments and/or interventions: pain improved    To be done/followed up on inpatient unit:  see orders, pt NPO, MRI pending    Does this patient have any cognitive concerns?:  none    Activity level - Baseline/Home:  Independent  Activity Level - Current:   Independent    Patient's Preferred language: English   Needed?: No    Isolation: None  Infection: Not Applicable  Patient tested for COVID 19 prior to admission: NO  Bariatric?: No    Vital Signs:   Vitals:    01/13/24 1341 01/13/24 1427 01/13/24 1507 01/13/24 1800   BP:    (!) 143/78   BP Location:       Patient Position:       Cuff Size:       Pulse: 108 105  108   Resp: 12 27 19 15   Temp:       TempSrc:       SpO2: 98% 95% 97%    Weight:           Cardiac Rhythm:Cardiac Rhythm: Sinus tachycardia    Was the PSS-3 completed:   Yes  What interventions are required if any?               Family Comments: sig other was at bedside  OBS brochure/video discussed/provided to patient/family: No              Name of person given brochure if not patient:               Relationship to patient:     For the majority of the shift this patient's behavior was Green.   Behavioral interventions performed were  none.    ED NURSE PHONE NUMBER: [id number]

## 2024-01-14 NOTE — PROGRESS NOTES
"[facility]    Hospitalist Progress Note    Date of Service (when I saw the patient): 01/14/2024    Assessment & Plan   Pt is 28-year-old male with no significant past medical history presents with 2-day history of abdominal bloating, CT abdomen indicates extensive signs of acute pancreatitis, no choledocholithiasis noted but Kylie lithiasis noted without cholecystitis.  Ultrasound of the abdomen was also obtained which did not indicate any choledocholithiasis.  Patient was noted to have lipase 776, he takes alcohol occasionally, last drink was 2 to 3 weeks ago, he was noted to have BRENDA with a creatinine of 2.5 and sodium of 122.  He had poor oral intake for 2 days.  LFTs are also elevated with a bilirubin 6.1, patient was probably would have passed a CBD stone.  White count very elevated at 15.9.  [facility] was consulted in the ED and they recommended MRCP which showed\"  at least 2 choledocholiths within the distal common bile duct measuring up to 3 mm with mild upstream biliary ductal dilation.Acute hemorrhagic pancreatitis with extensive peripancreatic inflammation and fluid as described. No organized peripancreatic fluid collections. No pancreatic ductal dilation. This noncontrast exam cannot evaluate for parenchymal necrosis      GI consulted.       ASESSMENT AND PLAN:  Acute hemorrhagic biliary pancreatitis.  Cholelithiasis w/o cholecystitis.  Choledocholithiasis with 3 cm CBD dilatation.  Elevated LFTs.  S/P ERCP on 11/14 with sphincterotomy and stent placement..  - Keep NPO.  - Pain control.  - IVF.  - Continue zosyn (possibility of cholangitis).  - Further plan per GI.      BRENDA. Unknown if he has CKD. Admit cr was 2.50-->: admit cr was 2.50-->3.27. no PTA comparison is available.  Likely prerenal in the setting of above.  - Continue IVF.  - Monitor BMP.  - Avoid nephrotoxins/renallt dose meds.  - Nephrology consulted,.    Hyponatremia: 122-->124. Could be related to volume " depletion.  - Cont hydration.  - Follow levels and adjust fluids as indicated.  - Urine studies.  - Nephrology consult.  .  DVT Prophylaxis: Enoxaparin (Lovenox) subcutaneous stopped once hemorrhagic pancreatitis noted in CT   Code Status: Full Code     Disposition: TBD (2-3 days).    Emmett Henley MD    Interval History   Doing ok postercp.  Pain is controlled. No cp/sob.    Physical Exam   Temp: 98  F (36.7  C) Temp src: Oral BP: 130/88 Pulse: 98   Resp: 17 SpO2: 95 % O2 Device: None (Room air)    Vitals:    01/13/24 1311   Weight: 97.5 kg (215 lb)     Vital Signs with Ranges  Temp:  [97.1  F (36.2  C)-98.5  F (36.9  C)] 98  F (36.7  C)  Pulse:  [] 98  Resp:  [10-27] 17  BP: (110-147)/(78-98) 130/88  SpO2:  [93 %-98 %] 95 %  I/O last 3 completed shifts:  In: 1100 [IV Piggyback:1100]  Out: 750 [Urine:750]    Respiratory: CTAB.  Cardiovascular:RRR.  GI: mild epigastric tenderness.   Skin/Integumen: no edema or discoloration.  Other:      Medications    lactated ringers 150 mL/hr at 01/14/24 0732      [Auto Hold] lactated ringers  1,000 mL Intravenous Once    [Auto Hold] pantoprazole  40 mg Intravenous Daily with breakfast    [Auto Hold] piperacillin-tazobactam  2.25 g Intravenous Q6H    [Auto Hold] sodium chloride (PF)  3 mL Intracatheter Q8H    [Auto Hold] sodium chloride (PF)  3 mL Intracatheter Q8H       Data   Recent Labs   Lab 01/14/24  0546 01/14/24  0020 01/13/24  2255 01/13/24  2146 01/13/24  1735 01/13/24  1333   WBC 13.0*  --   --   --   --  15.9*   HGB 11.8*  --   --   --   --  14.3   MCV 84  --   --   --   --  84     --   --   --   --  227   * 124*  --   --  124* 122*   POTASSIUM 4.6  --   --   --   --  4.4   CHLORIDE 92*  --   --   --   --  87*   CO2 16*  --   --   --   --  22   BUN 75.9*  --   --   --   --  68.4*   CR 3.27* 3.06*  --   --   --  2.50*   ANIONGAP 16*  --   --   --   --  13   LUBNA 6.6*  --   --   --   --  7.2*   *  --  111* 102*  --  141*   ALBUMIN 2.7*  --   --    --   --  3.4*   PROTTOTAL 5.7*  --   --   --   --  6.6   BILITOTAL 3.7*  --   --   --   --  6.1*   ALKPHOS 79  --   --   --   --  106   ALT 81*  --   --   --   --  112*   *  --   --   --   --  133*   LIPASE  --   --   --   --   --  776*       Recent Results (from the past 24 hour(s))   CT Abdomen Pelvis w/o Contrast    Narrative    EXAM: CT ABDOMEN PELVIS W/O CONTRAST  LOCATION: [facility]  DATE: 1/13/2024    INDICATION: Abdominal pain.  COMPARISON: None.  TECHNIQUE: CT scan of the abdomen and pelvis was performed without IV contrast. Multiplanar reformats were obtained. Dose reduction techniques were used.  CONTRAST: None.    FINDINGS:     LOWER CHEST: Trace left pleural effusion. Left basilar atelectasis.    HEPATOBILIARY: Cholelithiasis. No biliary ductal dilation. No liver lesions.    PANCREAS: Acute pancreatitis with extensive inflammation and unorganized fluid surrounding the entire pancreas extending into the left upper quadrant, central mesentery, and along the left greater right anterior pararenal fascia. Pockets of unorganized   fluid are present along the greater curvature of the stomach and along the left paracolic gutter. Small amount of ascites is also present around the liver and within the right lower quadrant. No organized peripancreatic fluid collections. No visible   ductal dilation.    SPLEEN: Normal.    ADRENAL GLANDS: Normal.    KIDNEYS/BLADDER: Normal.    BOWEL: No bowel obstruction. Reactive wall thickening of the duodenum. Appendix is not clearly visualized.    LYMPH NODES: No enlarged lymph node.    VASCULATURE: No abdominal aortic aneurysm.    PELVIC ORGANS: Normal.    MUSCULOSKELETAL: Small fat-containing periumbilical hernia. Multilevel degenerative changes of the spine.      Impression    IMPRESSION:     1.  Acute pancreatitis with extensive peripancreatic inflammation as described. No organized peripancreatic fluid collections. This noncontrast exam  cannot evaluate for parenchymal necrosis.    2.  Cholelithiasis. No biliary ductal dilation.    3.  Reactive wall thickening of the duodenum related to the pancreatitis.    4.  Small amount of ascites.    5.  Trace left pleural effusion with adjacent atelectasis.   US Abdomen Limited    Narrative    EXAM: US ABDOMEN LIMITED  LOCATION: [facility]  DATE: 1/13/2024    INDICATION: RUQ pain  COMPARISON: CT earlier today  TECHNIQUE: Limited abdominal ultrasound.    FINDINGS:    GALLBLADDER: Small shadowing stones and sludge within the gallbladder lumen. No gallbladder wall thickening or pericholecystic fluid. Sonographic Mark sign was negative.    BILE DUCTS: No biliary dilatation. The common duct measures 6 mm.    LIVER: Heterogeneous echogenicity and echotexture with mild hepatic steatosis. No focal mass.    RIGHT KIDNEY: No hydronephrosis.    PANCREAS: The pancreas is largely obscured by overlying gas.    Small ascites..      Impression    IMPRESSION:  1.  Cholelithiasis.  2.  Mildly heterogeneous liver with mild hepatic steatosis.  3.  Small ascites.  4.  Known pancreatitis was better assessed on the CT earlier today.       MR Abdomen MRCP without Contrast    Narrative    EXAM: MR ABDOMEN MRCP W/O CONTRAST  LOCATION: [facility]  DATE: 1/13/2024    INDICATION: Gallstone pancreatitis.  COMPARISON: CT abdomen pelvis 01/13/2024.  TECHNIQUE: Routine MR liver/pancreas protocol including axial and coronal MRCP sequences. 2D and 3D reconstruction performed by MR technologist including MIP reconstruction and slab cholangiograms. If performed with contrast, additional dynamic T1 post   IV contrast images.  CONTRAST: None.     FINDINGS:     LIVER: Noncirrhotic liver morphology. No hepatic steatosis or iron deposition. No focal liver lesions.    GALLBLADDER/BILIARY: Cholelithiasis. No evidence of acute cholecystitis. At least 2 choledocholiths within the common bile duct  measuring up to 3 mm (6/#10), with mild dilation of the upstream common bile duct to 8 mm and minimal intrahepatic biliary   ductal dilation.    PANCREAS: Acute hemorrhagic pancreatitis with extensive peripancreatic inflammation and hemorrhagic fluid surrounding the pancreas, extending into the left upper quadrant, and along the left anterior pararenal space and paracolic gutter. Pockets of   unorganized fluid along the greater curvature of the stomach and the left paracolic gutter appear similar to the CT earlier today. A small amount of ascites also remains present around the liver and in the right lower quadrant. No organized   peripancreatic fluid collections. No visible ductal dilation; ductal anatomy is not well defined. This noncontrast exam cannot evaluate for parenchymal necrosis.    SPLEEN: Normal.    ADRENALS: Normal.    KIDNEYS: Normal.    BOWEL: Reactive edema of the proximal duodenum related to the pancreatitis. Single mildly dilated jejunal loop in the left upper quadrant is probably a focal ileus.    LYMPH NODES: No enlarged lymph nodes.    VASCULATURE: No abdominal aortic aneurysm.    LUNG BASES: Trace left pleural effusion with adjacent atelectasis.    MUSCULOSKELETAL: No acute bony abnormality.       Impression    IMPRESSION:    1.  At least 2 choledocholiths within the distal common bile duct measuring up to 3 mm with mild upstream biliary ductal dilation.    2.  Acute hemorrhagic pancreatitis with extensive peripancreatic inflammation and fluid as described. No organized peripancreatic fluid collections. No pancreatic ductal dilation. This noncontrast exam cannot evaluate for parenchymal necrosis.    3.  Cholelithiasis, without evidence of acute cholecystitis.    4.  Reactive edema within the proximal duodenum related to the pancreatitis. A mildly dilated left upper quadrant jejunal loop is also likely a focal ileus related to the pancreatitis.    5.  Small amount of ascites.    6.  Trace left  pleural effusion with adjacent atelectasis.

## 2024-01-14 NOTE — PROGRESS NOTES
RECEIVING UNIT ED HANDOFF REVIEW    ED Nurse Handoff Report was reviewed by: Lydia Saldaña RN on January 13, 2024 at 8:32 PM

## 2024-01-15 LAB
ALBUMIN SERPL BCG-MCNC: 2.8 G/DL (ref 3.5–5.2)
ALBUMIN SERPL BCG-MCNC: 2.8 G/DL (ref 3.5–5.2)
ALP SERPL-CCNC: 73 U/L (ref 40–150)
ALT SERPL W P-5'-P-CCNC: 71 U/L (ref 0–70)
AMPHETAMINES UR QL SCN: ABNORMAL
ANION GAP SERPL CALCULATED.3IONS-SCNC: 15 MMOL/L (ref 7–15)
AST SERPL W P-5'-P-CCNC: 94 U/L (ref 0–45)
BARBITURATES UR QL SCN: ABNORMAL
BENZODIAZ UR QL SCN: ABNORMAL
BILIRUB DIRECT SERPL-MCNC: 1.52 MG/DL (ref 0–0.3)
BILIRUB SERPL-MCNC: 2.2 MG/DL
BUN SERPL-MCNC: 84.9 MG/DL (ref 6–20)
BZE UR QL SCN: ABNORMAL
CALCIUM SERPL-MCNC: 6.5 MG/DL (ref 8.6–10)
CANNABINOIDS UR QL SCN: ABNORMAL
CHLORIDE SERPL-SCNC: 94 MMOL/L (ref 98–107)
CREAT SERPL-MCNC: 4.24 MG/DL (ref 0.67–1.17)
DEPRECATED HCO3 PLAS-SCNC: 16 MMOL/L (ref 22–29)
EGFRCR SERPLBLD CKD-EPI 2021: 17 ML/MIN/1.73M2
ERYTHROCYTE [DISTWIDTH] IN BLOOD BY AUTOMATED COUNT: 16.3 % (ref 10–15)
FENTANYL UR QL: ABNORMAL
GLUCOSE SERPL-MCNC: 114 MG/DL (ref 70–99)
HCT VFR BLD AUTO: 28.5 % (ref 40–53)
HGB BLD-MCNC: 9.9 G/DL (ref 13.3–17.7)
MAGNESIUM SERPL-MCNC: 2.5 MG/DL (ref 1.7–2.3)
MCH RBC QN AUTO: 29.6 PG (ref 26.5–33)
MCHC RBC AUTO-ENTMCNC: 34.7 G/DL (ref 31.5–36.5)
MCV RBC AUTO: 85 FL (ref 78–100)
OPIATES UR QL SCN: ABNORMAL
PCP QUAL URINE (ROCHE): ABNORMAL
PHOSPHATE SERPL-MCNC: 5.3 MG/DL (ref 2.5–4.5)
PLATELET # BLD AUTO: 253 10E3/UL (ref 150–450)
POTASSIUM SERPL-SCNC: 4.3 MMOL/L (ref 3.4–5.3)
PROT SERPL-MCNC: 5.7 G/DL (ref 6.4–8.3)
RBC # BLD AUTO: 3.35 10E6/UL (ref 4.4–5.9)
SODIUM SERPL-SCNC: 125 MMOL/L (ref 135–145)
SODIUM SERPL-SCNC: 125 MMOL/L (ref 135–145)
SODIUM SERPL-SCNC: 126 MMOL/L (ref 135–145)
WBC # BLD AUTO: 13.2 10E3/UL (ref 4–11)

## 2024-01-15 PROCEDURE — 250N000013 HC RX MED GY IP 250 OP 250 PS 637: Performed by: INTERNAL MEDICINE

## 2024-01-15 PROCEDURE — 250N000009 HC RX 250: Performed by: INTERNAL MEDICINE

## 2024-01-15 PROCEDURE — 36415 COLL VENOUS BLD VENIPUNCTURE: CPT | Performed by: HOSPITALIST

## 2024-01-15 PROCEDURE — 250N000009 HC RX 250: Performed by: PAIN MEDICINE

## 2024-01-15 PROCEDURE — 258N000002 HC RX IP 258 OP 250: Performed by: INTERNAL MEDICINE

## 2024-01-15 PROCEDURE — 99232 SBSQ HOSP IP/OBS MODERATE 35: CPT | Performed by: SPECIALIST

## 2024-01-15 PROCEDURE — 258N000003 HC RX IP 258 OP 636: Performed by: INTERNAL MEDICINE

## 2024-01-15 PROCEDURE — 250N000011 HC RX IP 250 OP 636: Performed by: INTERNAL MEDICINE

## 2024-01-15 PROCEDURE — 99207 PR APP CREDIT; MD BILLING SHARED VISIT: CPT | Performed by: PAIN MEDICINE

## 2024-01-15 PROCEDURE — C9113 INJ PANTOPRAZOLE SODIUM, VIA: HCPCS | Performed by: INTERNAL MEDICINE

## 2024-01-15 PROCEDURE — 250N000011 HC RX IP 250 OP 636

## 2024-01-15 PROCEDURE — 85027 COMPLETE CBC AUTOMATED: CPT | Performed by: HOSPITALIST

## 2024-01-15 PROCEDURE — 250N000013 HC RX MED GY IP 250 OP 250 PS 637: Performed by: HOSPITALIST

## 2024-01-15 PROCEDURE — 84295 ASSAY OF SERUM SODIUM: CPT | Performed by: INTERNAL MEDICINE

## 2024-01-15 PROCEDURE — 99233 SBSQ HOSP IP/OBS HIGH 50: CPT | Performed by: INTERNAL MEDICINE

## 2024-01-15 PROCEDURE — 83735 ASSAY OF MAGNESIUM: CPT | Performed by: HOSPITALIST

## 2024-01-15 PROCEDURE — 99232 SBSQ HOSP IP/OBS MODERATE 35: CPT | Performed by: INTERNAL MEDICINE

## 2024-01-15 PROCEDURE — 82248 BILIRUBIN DIRECT: CPT | Performed by: NURSE PRACTITIONER

## 2024-01-15 PROCEDURE — 120N000013 HC R&B IMCU

## 2024-01-15 PROCEDURE — 99418 PROLNG IP/OBS E/M EA 15 MIN: CPT | Performed by: INTERNAL MEDICINE

## 2024-01-15 PROCEDURE — 84100 ASSAY OF PHOSPHORUS: CPT | Performed by: INTERNAL MEDICINE

## 2024-01-15 PROCEDURE — 250N000013 HC RX MED GY IP 250 OP 250 PS 637: Performed by: PAIN MEDICINE

## 2024-01-15 RX ORDER — PIPERACILLIN SODIUM, TAZOBACTAM SODIUM 2; .25 G/10ML; G/10ML
2.25 INJECTION, POWDER, LYOPHILIZED, FOR SOLUTION INTRAVENOUS EVERY 8 HOURS
Status: DISCONTINUED | OUTPATIENT
Start: 2024-01-15 | End: 2024-01-16

## 2024-01-15 RX ORDER — LIDOCAINE 4 G/G
1 PATCH TOPICAL
Status: DISCONTINUED | OUTPATIENT
Start: 2024-01-16 | End: 2024-01-15

## 2024-01-15 RX ORDER — METHOCARBAMOL 500 MG/1
500 TABLET, FILM COATED ORAL EVERY 8 HOURS PRN
Status: DISCONTINUED | OUTPATIENT
Start: 2024-01-15 | End: 2024-01-25

## 2024-01-15 RX ORDER — HYDROMORPHONE HYDROCHLORIDE 2 MG/1
2-4 TABLET ORAL EVERY 4 HOURS PRN
Status: DISCONTINUED | OUTPATIENT
Start: 2024-01-15 | End: 2024-01-16

## 2024-01-15 RX ORDER — LIDOCAINE 50 MG/G
OINTMENT TOPICAL 3 TIMES DAILY
Status: DISCONTINUED | OUTPATIENT
Start: 2024-01-15 | End: 2024-01-25

## 2024-01-15 RX ADMIN — HYDROMORPHONE HYDROCHLORIDE 0.5 MG: 1 INJECTION, SOLUTION INTRAMUSCULAR; INTRAVENOUS; SUBCUTANEOUS at 08:03

## 2024-01-15 RX ADMIN — SODIUM CHLORIDE, POTASSIUM CHLORIDE, SODIUM LACTATE AND CALCIUM CHLORIDE: 600; 310; 30; 20 INJECTION, SOLUTION INTRAVENOUS at 04:04

## 2024-01-15 RX ADMIN — PANTOPRAZOLE SODIUM 40 MG: 40 INJECTION, POWDER, FOR SOLUTION INTRAVENOUS at 20:07

## 2024-01-15 RX ADMIN — HYDROMORPHONE HYDROCHLORIDE 4 MG: 2 TABLET ORAL at 11:29

## 2024-01-15 RX ADMIN — HYDROMORPHONE HYDROCHLORIDE 0.5 MG: 1 INJECTION, SOLUTION INTRAMUSCULAR; INTRAVENOUS; SUBCUTANEOUS at 02:02

## 2024-01-15 RX ADMIN — LIDOCAINE: 50 OINTMENT TOPICAL at 22:21

## 2024-01-15 RX ADMIN — SODIUM BICARBONATE: 84 INJECTION, SOLUTION INTRAVENOUS at 23:29

## 2024-01-15 RX ADMIN — HYDROMORPHONE HYDROCHLORIDE 0.5 MG: 1 INJECTION, SOLUTION INTRAMUSCULAR; INTRAVENOUS; SUBCUTANEOUS at 00:12

## 2024-01-15 RX ADMIN — HYDROMORPHONE HYDROCHLORIDE 1 MG: 1 INJECTION, SOLUTION INTRAMUSCULAR; INTRAVENOUS; SUBCUTANEOUS at 12:31

## 2024-01-15 RX ADMIN — PIPERACILLIN AND TAZOBACTAM 2.25 G: 2; .25 INJECTION, POWDER, FOR SOLUTION INTRAVENOUS at 16:11

## 2024-01-15 RX ADMIN — PIPERACILLIN AND TAZOBACTAM 2.25 G: 2; .25 INJECTION, POWDER, FOR SOLUTION INTRAVENOUS at 08:05

## 2024-01-15 RX ADMIN — SODIUM CHLORIDE, POTASSIUM CHLORIDE, SODIUM LACTATE AND CALCIUM CHLORIDE: 600; 310; 30; 20 INJECTION, SOLUTION INTRAVENOUS at 10:24

## 2024-01-15 RX ADMIN — HYDROMORPHONE HYDROCHLORIDE 0.5 MG: 1 INJECTION, SOLUTION INTRAMUSCULAR; INTRAVENOUS; SUBCUTANEOUS at 10:06

## 2024-01-15 RX ADMIN — HYDROMORPHONE HYDROCHLORIDE 0.5 MG: 1 INJECTION, SOLUTION INTRAMUSCULAR; INTRAVENOUS; SUBCUTANEOUS at 04:03

## 2024-01-15 RX ADMIN — HYDROMORPHONE HYDROCHLORIDE 0.3 MG: 1 INJECTION, SOLUTION INTRAMUSCULAR; INTRAVENOUS; SUBCUTANEOUS at 14:19

## 2024-01-15 RX ADMIN — HYDROMORPHONE HYDROCHLORIDE 4 MG: 2 TABLET ORAL at 08:50

## 2024-01-15 RX ADMIN — HYDROMORPHONE HYDROCHLORIDE 0.3 MG: 1 INJECTION, SOLUTION INTRAMUSCULAR; INTRAVENOUS; SUBCUTANEOUS at 18:57

## 2024-01-15 RX ADMIN — METHOCARBAMOL 500 MG: 500 TABLET ORAL at 14:20

## 2024-01-15 RX ADMIN — MELATONIN TAB 3 MG 3 MG: 3 TAB at 22:21

## 2024-01-15 RX ADMIN — HYDROMORPHONE HYDROCHLORIDE 0.3 MG: 1 INJECTION, SOLUTION INTRAMUSCULAR; INTRAVENOUS; SUBCUTANEOUS at 16:50

## 2024-01-15 RX ADMIN — PANTOPRAZOLE SODIUM 40 MG: 40 INJECTION, POWDER, FOR SOLUTION INTRAVENOUS at 08:03

## 2024-01-15 RX ADMIN — SODIUM BICARBONATE: 84 INJECTION, SOLUTION INTRAVENOUS at 12:25

## 2024-01-15 RX ADMIN — HYDROMORPHONE HYDROCHLORIDE 2 MG: 2 TABLET ORAL at 20:07

## 2024-01-15 RX ADMIN — HYDROMORPHONE HYDROCHLORIDE 0.5 MG: 1 INJECTION, SOLUTION INTRAMUSCULAR; INTRAVENOUS; SUBCUTANEOUS at 06:21

## 2024-01-15 RX ADMIN — METHOCARBAMOL 500 MG: 500 TABLET ORAL at 22:21

## 2024-01-15 RX ADMIN — PIPERACILLIN AND TAZOBACTAM 2.25 G: 2; .25 INJECTION, POWDER, FOR SOLUTION INTRAVENOUS at 03:41

## 2024-01-15 ASSESSMENT — ACTIVITIES OF DAILY LIVING (ADL)
ADLS_ACUITY_SCORE: 20
ADLS_ACUITY_SCORE: 24
ADLS_ACUITY_SCORE: 20

## 2024-01-15 NOTE — PROGRESS NOTES
[id number]    [facility] [place]  [place]: A&Ox4  Vitals/Pain: AVSS on RA. Pt reporting back pain rated 2-5/10, managing with Dilaudid q 2h x 6  Tele: ST  Lines/Drains: 2 PIVs; 1 PIV x LR @ 150 mL/hr  LS: clear  GI/: BS normoactive, x2 BM this shift- loose, per-colored. Passing gas. Pt having adequate urine output throughout shift. Cornejo in place.  Labs: on K, Mg protocol - K, Mg AM draws  Ambulation/Assist: SB-assist  Plan: For possible laparoscopic cholecystectomy  Other Info: on NPO except meds and ice chips

## 2024-01-15 NOTE — PROGRESS NOTES
"[facility] Progress Note     Interval History:  Abdominal pain is described as \"soreness\" and is better than yesterday. Still distended. Main complaint is back pain and difficulty sleeping/getting comfortable. No n/v. Passing gas. Passing some per colored stools overnight per chart review.     Physical Exam:    /76   Pulse 101   Temp 97.5  F (36.4  C) (Oral)   Resp 17   Ht 1.829 m (6')   Wt 97.5 kg (215 lb)   SpO2 96%   BMI 29.16 kg/m    Temp (24hrs), Av.2  F (36.8  C), Min:97.5  F (36.4  C), Max:98.7  F (37.1  C)    Patient Vitals for the past 72 hrs:   Weight   24 1311 97.5 kg (215 lb)       Intake/Output Summary (Last 24 hours) at 1/15/2024 0945  Last data filed at 1/15/2024 0400  Gross per 24 hour   Intake 4635 ml   Output 750 ml   Net 3885 ml       Constitutional: No acute distress  Cardiovascular: RRR, normal S1/S2   Respiratory: Effort normal, CTA bilaterally  Abdomen: Soft, distended, mild tenderness, BS hypo  Neuro: A+0  Skin: No jaundice      Laboratory Data  Recent Labs   Lab Test 01/15/24  0548 24  1825 24  0546 24  1333   WBC 13.2*  --  13.0* 15.9*   HGB 9.9* 11.2* 11.8* 14.3   MCV 85  --  84 84     --  196 227     Recent Labs   Lab Test 01/15/24  0548 24  2346 24  1825 24  1304 24  0546 24  1735 24  1333   *  125* 126* 126* 124* 124*   < > 122*   POTASSIUM 4.3  --   --   --  4.6  --  4.4   CHLORIDE 94*  --   --   --  92*  --  87*   CO2 16*  --   --   --  16*  --  22   BUN 84.9*  --   --   --  75.9*  --  68.4*   CR 4.24*  --   --  3.61* 3.27*   < > 2.50*   ANIONGAP 15  --   --   --  16*  --  13   LUBNA 6.5*  --   --   --  6.6*  --  7.2*    < > = values in this interval not displayed.     Recent Labs   Lab Test 01/15/24  0548 24  0546 24  1333   ALBUMIN 2.8*  2.8* 2.7* 3.4*   BILITOTAL 2.2* 3.7* 6.1*   DBIL 1.52*  --   --    ALT 71* 81* 112*   AST 94* 106* 133*   ALKPHOS 73 79 106   LIPASE  --   --  776* "       Imaging  EXAM: MR ABDOMEN MRCP W/O CONTRAST  LOCATION: [facility]  DATE: 1/13/2024  IMPRESSION:     1.  At least 2 choledocholiths within the distal common bile duct measuring up to 3 mm with mild upstream biliary ductal dilation.     2.  Acute hemorrhagic pancreatitis with extensive peripancreatic inflammation and fluid as described. No organized peripancreatic fluid collections. No pancreatic ductal dilation. This noncontrast exam cannot evaluate for parenchymal necrosis.     3.  Cholelithiasis, without evidence of acute cholecystitis.     4.  Reactive edema within the proximal duodenum related to the pancreatitis. A mildly dilated left upper quadrant jejunal loop is also likely a focal ileus related to the pancreatitis.     5.  Small amount of ascites.     6.  Trace left pleural effusion with adjacent atelectasis.  Endoscopic Workup  ERCP 1/14/2024  Findings:       The  film was normal. There was extensive edema and erosive        gastropathy present. There is also severe edema in the duodenum with        multiple ulcerations and erosions. Was very difficult to find the        papilla. Eventually it was located. The pancreatic duct was initially        cannulated. A wire was placed. The pancreatogram appeared normal.        Further trials to cannulate the bile duct with a double wire technique        were unsuccessful. At this point, a 5 Welsh by 3 cm pancreatic stent        without internal flange was then placed into the pancreatic duct. A        needle-knife was then used to precut into the bile duct. A wire was        advanced. The cholangiogram revealed a 9 mm bile duct without apparent        filling defects. Balloon sweeps produced a few stone fragments but was        otherwise clear. A 10 Welsh by 7 cm plastic biliary stent was then        placed. Excellent drainage was achieved.                                                                                     Impression:               - Choledocholithiasis was found. Complete removal                             was accomplished by biliary sphincterotomy. Biliary                             stent placed given the severe edema.                             - Pancreatic stent placed.                             - Severe erosive gastropathy and duodenopathy.   Recommendation:           - [facility].                             - Continue NPO.                             - IV PPI q 12 hours.                             - ABD x-ray in 10 days to eval for retained PD                             stent. If still present, EGD to remove.                             - ERCP in 4-6 weeks to remove biliary stent. This                             could be removed at the time of the PD stent                             removal if pt is doing well clinically.                                                                         Assessment & Plan:  47 yo M with moderate/severe gallstone pancreatitis with evidence of obstructing stones and hemorrhagic pancreatitis. ERCP 1/14/24 with erosive gastropathy and duodenitis, biliary sphincterotomy with stone removal, pancreatic stent placed and CBD stent placed due to severe edema. Total bili, AST/ALT trending down. WBC stable at 13. Creat trending up.     There was initially some concern for possible compartment syndrome given the degree of abdominal distension. Pt states distention is still present, but improved today.     Plan  -NPO  -PPI IV BID  -Monitor LFTs  -Pain control per hospitalist  -Nephrology consulted for BRENDA  - ABD x-ray in 10 days to eval for retained PD stent. If still present, EGD to remove.   - ERCP in 4-6 weeks to remove biliary stent. This could be removed at the time of the PD stent removal if pt is doing well clinically.   -Surgery has been consulted    Will discuss with Dr. Rakesh Stewart, CNP  [facility]  Cell: [phone] until 12PM  Office: [phone]

## 2024-01-15 NOTE — PLAN OF CARE
Orientations: A&O x4   Vitals/Pain: 3/10 abdominal and back pain managed by IV dilaudid and robaxin   Tele: SR/ST. VSS on room air.   Lines/Drains: Cornejo cath, Right and left PIVs, right infusing .45 NS w/sodium bicarb at 100 ml/hr.  Skin/Wounds: WNL  GI/: Distended abdomen, soft and tender. Bowel sounds hypoactive. +flatus. small amount mucous like stool. Adequate UOP.   Diet: NPO  Labs: Na+: 125, potassium: 4.3, Creat: 4.24. Potassium and magnesium protocol   Ambulation/Assist: Standby assist  Sleep Quality: good  Plan: Surgery will follow up tomorrow. Continue Abx.   PHYSICAL EXAMINATION FORM   Name: Bobby Kaur      EXAMINATION     Vitals: /66   Pulse 99   Temp 99.2 °F (37.3 °C) (Temporal)   Ht 5' 4.25\" (1.632 m)   Wt 78.9 kg (174 lb)   BMI 29.64 kg/m²   BSA 1.85 m²  male  Vision: R 20/20             L 20/25                   Corrected   Y         N     MEDICAL NORMAL ABNORMAL FINDINGS   Appearance  · Marfan stigmata (kyphoscoliosis, high-arched palate, pectus excavatum, arachnodactyly, arm span > height, hyperlaxity, myopia, MVP, aortic insufficiency) Yes    Eyes/ears/nose/throat  · Pupils equal  · Hearing Yes    Lymph nodes Yes    Heart*  · Murmurs (auscultation standing, supine, +/- Valsalva)  · Location of point of maximal impulse (PMI) Yes    Pulses  · Simultaneous femoral and radial pulses Yes    Lungs Yes    Abdomen Yes    Genitourinary (males only)** Yes    Skin  · HSV, lesions suggestive of MRSA, tinea corporis Yes    Neurologic# Yes    MUSCULOSKELETAL     Neck Yes    Back Yes    Shoulder/arm Yes    Elbow/forearm Yes    Wrist/hand/fingers Yes    Hip/thigh Yes    Knee Yes    Leg/Ankle Yes    Foot/toes Yes    Functional  · Duck-walk, single leg hop Yes    * Consider ECG, echocardiogram, and referral to cardiology for abnormal cardiac history or exam  ** Consider  exam in private setting.  Having third party present is recommended.  # Consider cognitive evaluation or baseline neuropsychiatric testing if a history of significant concussion.    On the basic on the examination on this day, I approve this child's participation in interscholastic sports for 395 days from this date.  Yes  Examination Date: 8/4/2020    Additional Comments:                    Signature*: _______________________________________           Print Name:  Bhavin Cuba MD                         * effective January 2003, the [facility] Board of Directors approved a recommendation, consistent with the [place] [employer] Code, that allow Physician's Assistants or Advanced Nurse Probationers to  sign off on physicals.               To be completed by athlete or parent prior to examination  Name: Bobby Kaur  School Year: ____________________   Address: ___________________________________________ City/State: ________________________________________________   Phone No: __________________________________________ Birthday: 2004    Age: 16 year old   Class: ________ Student ID No: _______   Parent's Name: ______________________________________ Phone No: ________________________________________________   Address: ___________________________________________ City/State: ________________________________________________   HISTORY FORM    Medicines and Allergies: Please list all of the prescription and over- the-counter medicines (herbal and nutritional) that your are currently taking   Do you have allergies?  __ Yes    __ No If yes, please identify specific allergy below.   __ Medicines                                              __Pollens                                              __Food                                              __Stinging Insects   Explain \"Yes\" answers below.  [place] questions you don't know the answer to.  GENERAL QUESTIONS Yes No    1  Has a doctor ever denied or restricted your participation in sports       for any reason?      2  Do you have any ongoing medical conditions? If so, please        identify below: __Asthma  __Anemia  __Diabetes  __Infections      Other: _________________________________________      3  Have you ever spent the night in the hospital?      4  Have you ever had surgery?      HEART HEALTH QUESTIONS ABOUT YOU Yes No    5  Have you ever passed out or nearly passes out DURING or        AFTER exercise?      6  Have you ever had discomfort, pain, tightness or pressure in       your chest during exercise?       7  Does your heart ever race or skip beats (irregular beats)       during exercise?      8  Has a doctor ever told you that you have any heart problems?        If so check all that apply: __High blood pressure       __Heart murmur  __High cholesterol  __ Heart infection      __Kawasaki disease  __Other_________________________      9   Has a doctor ever ordered a test for your heart? (for example        ECG/EKG, echocardiogram)      10  Do you every get lightheaded or feel more short of breath         than expected during exercise?       11  Have you ever had an unexplained seizure?      12  Do you get more tired or short of breath more quickly than         your friends during exercise?      HEART HEALTH QUESTIONS ABOUT YOUR FAMILY Yes No    13 Has any family member or relative  of heart problems or         had an unexpected or unexplained sudden death before age         50 (including drowning, unexplained car accident, or sudden        infant death syndrome)?      14 Does anyone in your family have hypertrophic        cardiomyopathy, Marfan syndrome, arrhythmogenic right        ventricular cardiomyopathy, long QT syndrome, short QT        syndrome, Brugada syndrome, or catecholaminergic        polymorphic ventricular tachycardia)?      15 Does anyone in your family have a heart problem,        pacemaker, or implanted defibrillator?      16 Has anyone in your family had unexplained fainting,        unexplained seizure, or near drowning?      BONE AND JOINT QUESTIONS Yes No    17 Have you ever had an injury to a bone, muscle, ligament or        tendon that caused you to miss a practice or a game?      18 Have you ever had any broken or fracture bones or         dislocated joints?      19 Have you ever had any injury that required x-rays, MRI, CT        scan, injections, therapy, brace, cast, or crutches?      20 Have you ever had a stress fracture?      21 Have you ever been told that you have or have had an x-ray        for neck instability or atlantoaxial instability? (Down         syndrome or dwarfism)      22 Do you regularly use a brace, orthotics, or other  assistive         device?      23 Do you have a bone, muscle, or joint injury that bothers you?      24 Do any of your joints become painful, swollen, feel warm, or       look red?      25 Do you have any history of juvenile arthritis or connective       tissue disease?       MEDICAL QUESTIONS Yes No   26 Do you cough, wheeze, or have difficulty breathing during       or after exercise?     27 Have you ever used an inhaler or taken asthma medicine?     28. Is there anyone in your family who has asthma?     29 Were you born without or are you missing a kidney, an eye,         a testicle (males), your spleen, or any other organ?     30 Do you have groin pain or painful bulge or hernia in the groin area?     31 Have you had infectious mononucleosis (mono) within the last        month?     32 Do you have any rashes, pressure sores, or other skin problems?     33 Have you had a herpes or MRSA skin infection?     34 Have you ever had a head injury or concussion?     35 Have you ever had a hit or blow to the head that caused         confusion, prolonged headache, or memory problems?     36 Do you have a history of seizure disorder?     37 Do you have headaches with exercise?     38 Have you ever had numbness, tingling, or weakness in your arms        or legs after being hit or falling?     39 Have you ever been unable to move your or legs after being hit or        falling?     40 Have you ever become ill while exercising in the heat?     41 Do you get frequent muscle cramps when exercising?     42 Do you or someone in your family have sickle cell trait or disease?     43 Have you had any problems with your eyes or vision?     44 Have you had any eye injuries?     45 Do you wear glasses or contact lenses?     46 Do you wear protective eyewear, such as goggles or a face shield?     47 Do you worry about your weight?     48 Are you trying to or has anyone recommended that you gain or         lose weight?     49 Are you on a  special diet or do you avoid certain types of foods?     50 Have you ever had an eating disorder?     51 Have you or any family member or relative been diagnosed with        cancer?     52 Do you have any concerns that you would like to discuss with a        doctor?     FEMALES ONLY Yes No   53 Have you ever had a menstrual period?     54 How old were you when you had your first menstrual period?     55 How many periods have you had in the last 12 months?       Explain 'yes' answers here:  ________________________________________________________________    ________________________________________________________________    ________________________________________________________________    ________________________________________________________________    ________________________________________________________________    ________________________________________________________________    ________________________________________________________________    ________________________________________________________________     I hereby state that, to the best of my knowledge, my answers to the above questions are complete and correct.   Signature of athlete: _____________________________________________ Signature of parent/guardian: ________________________________________

## 2024-01-15 NOTE — PROGRESS NOTES
[facility]    Medicine Progress Note - Hospitalist Service    Date of Admission:  1/13/2024    Assessment & Plan   Pt is 28-year-old male with no significant past medical history presents with 2-day history of abdominal bloating,     Acute hemorrhagic biliary pancreatitis.  Cholelithiasis w/o cholecystitis.  Choledocholithiasis with 3 cm CBD dilatation.  Elevated LFTs.  CT abdomen -extensive signs of acute pancreatitis, no choledocholithiasis noted but Cholelithiasis noted without cholecystitis.  Ultrasound of the abdomen was also obtained which did not indicate any choledocholithiasis.  Initial lipase 776.  White count 15.1 at presentation.  Bilirubin 6.1  [facility] following, appreciate input  -MRCP-with 2 choledocholithiasis with distal common bile duct measuring up to 3 mm with mild upstream biliary ductal dilatation.  Acute hemorrhagic pancreatitis with extensive peripancreatic inflammation and fluid as described, cholelithiasis without evidence of acute cholecystitis.  Reactive edema within the proximal duodenum related to pancreatitis  -ERCP-1/14 choledocholithiasis was found with complete removal with biliary sphincterotomy and biliary stent placed given severe edema.  Pancreatic stent placed.  Severe erosive gastropathy and duodenopathy was also found  -LFTs/total bilirubin improving and is 2.2 today  -IV PPI every 12 hours  Abdominal x-ray in 10 days to evaluate for retained pancreatic stent.  ERCP in 4 to 6 weeks to remove biliary stent.  -Continue hydration, n.p.o.  -Surgery following, appreciate input, plan is for laparoscopic cholecystectomy once renal function improved and his abdomen distention also improves.  -Continue ambulation so as to get bowels moving  -Patient reports his pain is not controlled  -Pain management consulted  -Continue lidocaine patch for back pain, oral Dilaudid 4 mg as needed with IV Dilaudid as needed     Acute kidney injury  Hyponatremia  Nonanion  gap metabolic acidosis, likely secondary to BRENDA    creatinine of 2.5 and sodium of 122 at presentation  -  Renal function gradually worsening and is 4.24 this morning, sodium is 125.  Bicarb of 16 and anion gap of 15  -Patient has started making urine  -nephrology following and adjusting his IV fluid, appreciate input  -Patient started on bicarb containing IV fluid  -Avoid nephrotoxins    Leukocytosis  -Leukocytosis likely from acute stress, patient has remained afebrile  -Has been started on Zosyn, blood cultures negative to date  -If culture continues to remain negative, likely can discontinue antibiotics in a day or 2 if okay by GI/surgery    Anemia  Unknown baseline  Hemoglobin was normal at presentation, gradually dropping likely secondary to hydration, hemoglobin this morning is 9.9  Patient denies any active bleed, monitor for any further drop  -During ERCP patient was noted to have severe erosive gastropathy and duodenopathy as above  -Patient has been started on Protonix IV twice daily, will continue        Diet: NPO for Medical/Clinical Reasons Except for: Meds, Ice Chips    DVT Prophylaxis: Pneumatic Compression Devices  Cornejo Catheter: PRESENT, indication: Strict 1-2 Hour I&O  Lines: None     Cardiac Monitoring: None  Code Status: Full Code      Clinically Significant Risk Factors         # Hyponatremia: Lowest Na = 122 mmol/L in last 2 days, will monitor as appropriate  # Hypocalcemia: Lowest iCa = 3.2 mg/dL in last 2 days, will monitor and replace as appropriate   # Hypomagnesemia: Lowest Mg = 1.3 mg/dL in last 2 days, will replace as needed   # Hypoalbuminemia: Lowest albumin = 2.7 g/dL at 1/14/2024  5:46 AM, will monitor as appropriate    # Acute Kidney Injury, unspecified: based on a >150% or 0.3 mg/dL increase in last creatinine compared to past 90 day average, will monitor renal function         # Overweight: Estimated body mass index is 29.16 kg/m  as calculated from the following:    Height as  of this encounter: 1.829 m (6').    Weight as of this encounter: 97.5 kg (215 lb)., PRESENT ON ADMISSION            Disposition Plan     Expected Discharge Date: 01/15/2024      Destination: home with family              Rylee Colón MD  Hospitalist Service  [facility]  Securely message with [employer] (more info)  Text page via [employer] Paging/Directory   ______________________________________________________________________    Interval History   Care assumed, chart reviewed.  Patient reports significant pain especially on his back and patient being unable to sleep.  Discussed with general surgery about plan of care.  Given significant distention and worsening renal failure General surgery wants to wait for surgery.  Patient and family was updated over the phone in the patient's room.  Questions answered.    Physical Exam   Vital Signs: Temp: 97.5  F (36.4  C) Temp src: Oral BP: 138/76 Pulse: 101   Resp: 17 SpO2: 96 % O2 Device: None (Room air)    Weight: 215 lbs 0 oz    Exam:  Constitutional: Awake, alert and no distress. Appears comfortable  Head: Normocephalic. No masses, lesions, tenderness or abnormalities  ENMT: ENT exam normal, no neck nodes or sinus tenderness  Cardiovascular: RRR.  no murmurs, no rubs or JVD  Respiratory:normal WOB,b/l equal air entry, no wheezes or crackles   Gastrointestinal: Abdomen distended, generalized tenderness, no peritoneal signs. BS hypoactive. No masses, organomegaly  : Deferred  Extremities :no edema , no clubbing or cyanosis      Medical Decision Making       55 MINUTES SPENT BY ME on the date of service doing chart review, history, exam, documentation & further activities per the note.      Data     I have personally reviewed the following data over the past 24 hrs:    13.2 (H)  \   9.9 (L)   / 253     125 (L); 125 (L) 94 (L) 84.9 (H) /  114 (H)   4.3 16 (L) 4.24 (H) \     ALT: 71 (H) AST: 94 (H) AP: 73 TBILI: 2.2 (H)   ALB: 2.8 (L); 2.8 (L) TOT  PROTEIN: 5.7 (L) LIPASE: N/A       Imaging results reviewed over the past 24 hrs:   No results found for this or any previous visit (from the past 24 hour(s)).  Recent Labs   Lab 01/15/24  0548 01/14/24  2346 01/14/24  1825 01/14/24  1304 01/14/24  0546 01/14/24  0020 01/13/24  2255 01/13/24  1735 01/13/24  1333   WBC 13.2*  --   --   --  13.0*  --   --   --  15.9*   HGB 9.9*  --  11.2*  --  11.8*  --   --   --  14.3   MCV 85  --   --   --  84  --   --   --  84     --   --   --  196  --   --   --  227   *  125* 126* 126* 124* 124*   < >  --    < > 122*   POTASSIUM 4.3  --   --   --  4.6  --   --   --  4.4   CHLORIDE 94*  --   --   --  92*  --   --   --  87*   CO2 16*  --   --   --  16*  --   --   --  22   BUN 84.9*  --   --   --  75.9*  --   --   --  68.4*   CR 4.24*  --   --  3.61* 3.27*   < >  --   --  2.50*   ANIONGAP 15  --   --   --  16*  --   --   --  13   LUBNA 6.5*  --   --   --  6.6*  --   --   --  7.2*   *  --   --   --  114*  --  111*   < > 141*   ALBUMIN 2.8*  2.8*  --   --   --  2.7*  --   --   --  3.4*   PROTTOTAL 5.7*  --   --   --  5.7*  --   --   --  6.6   BILITOTAL 2.2*  --   --   --  3.7*  --   --   --  6.1*   ALKPHOS 73  --   --   --  79  --   --   --  106   ALT 71*  --   --   --  81*  --   --   --  112*   AST 94*  --   --   --  106*  --   --   --  133*   LIPASE  --   --   --   --   --   --   --   --  776*    < > = values in this interval not displayed.

## 2024-01-15 NOTE — PROGRESS NOTES
General Surgery:  Complains of pain and bloating.  Afebrile.  Mildly tachycardic, pulse 100.  Blood pressure stable.    Physical exam:  Abdomen is markedly distended.  Mild expected tenderness.    Laboratories:  Creatinine 4.2, rising  Bilirubin 2.2, down    White blood count 13.2  Hemoglobin 9.9    Impression: Gallstone pancreatitis.  Status post ERCP yesterday.  His abdomen is markedly distended and I suspect he has worse pancreatitis then may be we initially thought.  He has secondary acute renal failure.  Urine output has increased so hopefully this will be turning around.  At this point we need to wait for his swelling to resolve before proceeding with any surgery.  Would continue to keep him n.p.o.  Try to increase activity.  Will follow with you.

## 2024-01-15 NOTE — CONSULTS
[facility] ACUTE INPATIENT PAIN SERVICE    [facility], [facility], [facility], [facility], [place]   PAIN Consult     Assessment/Plan:  Talha Alexander is a 48 year old male who was admitted on 1/13/2024.  I was asked to see the patient for Acute onset pain secondary to gallstone pancreatitis. Admitted for pancreatitis. History of being overweight.    shows no results. Describes pain as improved today.     PLAN:  Acute pain secondary to pancreatis. High risk for overdose given BRENDA and The patient is opioid naïve at baseline.     Multimodal Medication Therapy:   Adjuvants:  agree with lidocaine patch - change to lidocaine cream, No NSAIDs, robaxin may be of benefit   Opioids: Dilaudid PO 4mg PRN   IV dilaudid PRN - would suggest lower dose   Non-medication interventions- Ice to the low back   Constipation Prophylaxis- suggest adding senna and miralax - defer to GI  Follow up /Discharge Recommendations - We recommend prescribing the following at the time of discharge:  TBD           Subjective:    Pain is mostly in the low back. Pain began when he woke up in the middle of the night, on Wednesday. He was pacing at home in pain. Now found a positing that helps with pain. Best since Wednesday. He has been able to walk. Taking ice chips. Since Wednesday he has very little food. He is hungry. Denies nausea. He was previously dry heaving- that has stopped. 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 No home meds. Occasionally will take aleve- 1X/month or less. Lived in [place] for a long period of time. Previously broke his arm, did not have any other hospitalizations.      Tele-Visit Details    Type of service:  Video Visit    Time Service Began (time 1st connected with pt): 1305    Time Service Ended (time completely finished with pt): 1405    Video Start Time (time video started): 1324    Video End Time (time video stopped):  1350    Originating Location (pt. Location): Patient Hospital Room     Distant Location (provider location): [facility]      Reason for Televisit: Pain Consult     Mode of Communication:  Video Conference via [email or web address]    Physician has received verbal consent for a video visit from the patient? Yes      MORENA Jordan CNP        BRENDA (acute kidney injury) (H24)   Patient Active Problem List   Diagnosis    Acute pancreatitis    Calculus of gallbladder without cholecystitis    Hyponatremia    Gallstone pancreatitis    Elevated LFTs    BRENDA (acute kidney injury) (H24)    Acute biliary pancreatitis, unspecified complication status        History   Drug Use Not on file         Tobacco Use      Smoking status: None      Smokeless tobacco: None         lactated ringers  1,000 mL Intravenous Once    [START ON 1/16/2024] lidocaine  1 patch Transdermal Q24H    nicotine  1 patch Transdermal Daily    nicotine   Transdermal Q8H    pantoprazole  40 mg Intravenous BID    piperacillin-tazobactam  2.25 g Intravenous Q8H    sodium chloride (PF)  3 mL Intracatheter Q8H    sodium chloride (PF)  3 mL Intracatheter Q8H       Objective:  Vital signs in last 24 hours:  B/P: 128/81, T: 97.7, P: 101, R: 16   Blood pressure 128/81, pulse 101, temperature 97.7  F (36.5  C), temperature source Oral, resp. rate 16, height 1.829 m (6'), weight 97.5 kg (215 lb), SpO2 97%.      Weight:   Wt Readings from Last 2 Encounters:   01/13/24 97.5 kg (215 lb)           Intake/Output:    Intake/Output Summary (Last 24 hours) at 1/15/2024 1258  Last data filed at 1/15/2024 0400  Gross per 24 hour   Intake 3735 ml   Output 750 ml   Net 2985 ml        Review of Systems:   As per subjective, all others negative.    Physical Exam:     Pt is calm and conversant     Imaging:  Personally Reviewed.    Results for orders placed or performed during the hospital encounter of 01/13/24   US Abdomen Limited    Impression    IMPRESSION:  1.  Cholelithiasis.  2.  Mildly heterogeneous liver with mild hepatic steatosis.  3.  Small ascites.  4.  Known pancreatitis was better  assessed on the CT earlier today.       CT Abdomen Pelvis w/o Contrast    Impression    IMPRESSION:     1.  Acute pancreatitis with extensive peripancreatic inflammation as described. No organized peripancreatic fluid collections. This noncontrast exam cannot evaluate for parenchymal necrosis.    2.  Cholelithiasis. No biliary ductal dilation.    3.  Reactive wall thickening of the duodenum related to the pancreatitis.    4.  Small amount of ascites.    5.  Trace left pleural effusion with adjacent atelectasis.   MR Abdomen MRCP without Contrast    Impression    IMPRESSION:    1.  At least 2 choledocholiths within the distal common bile duct measuring up to 3 mm with mild upstream biliary ductal dilation.    2.  Acute hemorrhagic pancreatitis with extensive peripancreatic inflammation and fluid as described. No organized peripancreatic fluid collections. No pancreatic ductal dilation. This noncontrast exam cannot evaluate for parenchymal necrosis.    3.  Cholelithiasis, without evidence of acute cholecystitis.    4.  Reactive edema within the proximal duodenum related to the pancreatitis. A mildly dilated left upper quadrant jejunal loop is also likely a focal ileus related to the pancreatitis.    5.  Small amount of ascites.    6.  Trace left pleural effusion with adjacent atelectasis.        Lab Results:  Personally Reviewed.   Last Comprehensive Metabolic Panel:  Sodium   Date Value Ref Range Status   01/15/2024 125 (L) 135 - 145 mmol/L Final     Comment:     Reference intervals for this test were updated on 09/26/2023 to more accurately reflect our healthy population. There may be differences in the flagging of prior results with similar values performed with this method. Interpretation of those prior results can be made in the context of the updated reference intervals.  Reference intervals for this test were updated on 09/26/2023 to more accurately reflect our healthy population. There may be differences in  "the flagging of prior results with similar values performed with this method. Interpretation of those prior results can be made in the context of the updated reference intervals.    01/15/2024 125 (L) 135 - 145 mmol/L Final     Comment:     Reference intervals for this test were updated on 09/26/2023 to more accurately reflect our healthy population. There may be differences in the flagging of prior results with similar values performed with this method. Interpretation of those prior results can be made in the context of the updated reference intervals.      Potassium   Date Value Ref Range Status   01/15/2024 4.3 3.4 - 5.3 mmol/L Final     Chloride   Date Value Ref Range Status   01/15/2024 94 (L) 98 - 107 mmol/L Final     Carbon Dioxide (CO2)   Date Value Ref Range Status   01/15/2024 16 (L) 22 - 29 mmol/L Final     Anion Gap   Date Value Ref Range Status   01/15/2024 15 7 - 15 mmol/L Final     Glucose   Date Value Ref Range Status   01/15/2024 114 (H) 70 - 99 mg/dL Final     GLUCOSE BY METER POCT   Date Value Ref Range Status   01/13/2024 111 (H) 70 - 99 mg/dL Final     Urea Nitrogen   Date Value Ref Range Status   01/15/2024 84.9 (H) 6.0 - 20.0 mg/dL Final     Creatinine   Date Value Ref Range Status   01/15/2024 4.24 (H) 0.67 - 1.17 mg/dL Final     GFR Estimate   Date Value Ref Range Status   01/15/2024 17 (L) >60 mL/min/1.73m2 Final     Calcium   Date Value Ref Range Status   01/15/2024 6.5 (L) 8.6 - 10.0 mg/dL Final        UA: No results found for: \"UAMP\", \"UBARB\", \"BENZODIAZEUR\", \"UCANN\", \"UCOC\", \"OPIT\", \"UPCP\"           -------------------------- BILLING ON TIME ------------------------------------------------------------------------------------------------------------  60 MINUTES SPENT BY ME on the date of service doing chart review, history, exam, documentation & further activities per the note.        Soni Stewart APRN, CNS-BC, CNP, ACHPN  Acute Care Pain Management Program   Hours of pain coverage " Mon-Fri 8-1600, afterhours please call the house officer   [facility] (GEORGE, Ivanna, [place], [place])   Page via [employer]- Click HERE to page Soni or Alireza text web console -Click for Alireza

## 2024-01-15 NOTE — PROGRESS NOTES
Assessment and Plan:     BRENDA: Hyponatremia as well. Considered to be dry. Metabolic acidosis.     I/O 4635/250.  BP stable.   Na 125, K 4.3, HCO3 16, Cr: 2.50 > > 4.24. FENa 1.4, Tri 30, Uosm 393 > indeterminant.     He got 2L LR yesterday. Now LR at 150 ml/h.     Add IV bicarb solution   Monitor labs.                Interval History:   Gallstone pancreatitis:  Adm 1/13/24. S/P ERCP yesterday. tBili improved.   ERCP > Complete removal of stones was accomplished by biliary sphincterotomy.   Biliary stent placed given the severe edema. Pancreatic stent placed.   On protonix and zosyn.                      Review of Systems:   Pt C/O back pain. No N or V. Remains NPO. Alert and responsive.          Medications:      lactated ringers  1,000 mL Intravenous Once    [START ON 1/16/2024] lidocaine  1 patch Transdermal Q24H    nicotine  1 patch Transdermal Daily    nicotine   Transdermal Q8H    pantoprazole  40 mg Intravenous BID    piperacillin-tazobactam  2.25 g Intravenous Q8H    sodium chloride (PF)  3 mL Intracatheter Q8H    sodium chloride (PF)  3 mL Intracatheter Q8H      lactated ringers 150 mL/hr at 01/15/24 1024     Current active medications and PTA medications reviewed, see medication list for details.            Physical Exam:   Vitals were reviewed  Patient Vitals for the past 24 hrs:   BP Temp Temp src Pulse Resp SpO2   01/15/24 1000 (!) 120/94 -- -- 100 17 96 %   01/15/24 0800 138/76 -- -- 101 17 96 %   01/15/24 0721 -- 97.5  F (36.4  C) Oral -- -- --   01/15/24 0600 133/77 97.8  F (36.6  C) Oral 101 21 --   01/15/24 0400 137/76 98  F (36.7  C) Oral 102 20 97 %   01/15/24 0200 124/78 98.1  F (36.7  C) Oral 99 24 98 %   01/15/24 0000 121/84 98.3  F (36.8  C) Oral 101 18 97 %   01/14/24 2200 135/86 98.6  F (37  C) Oral 105 27 97 %   01/14/24 2051 -- 98.7  F (37.1  C) Oral -- -- --   01/14/24 2000 (!) 148/83 98.7  F (37.1  C) Oral 100 18 95 %   01/14/24 1756 (!) 138/90 -- -- 104 24 --   01/14/24 1600 --  -- -- 98 16 --   24 1545 (!) 132/90 -- -- 100 24 --   24 1500 -- -- -- 104 20 --   24 1430 -- -- -- 109 26 --   24 1345 -- -- -- 102 20 --   24 1330 -- -- -- 101 18 --   24 1315 -- -- -- 104 12 --   24 1300 -- -- -- 98 22 --   24 1245 -- -- -- 101 21 --   24 1241 -- -- -- 104 22 --   24 1230 -- -- -- 101 20 --   24 1215 136/80 -- -- -- -- --   24 1130 (!) 154/95 -- -- 102 12 96 %   24 1115 (!) 143/89 -- -- 101 18 98 %   24 1111 -- 98  F (36.7  C) Temporal -- -- --       Temp:  [97.5  F (36.4  C)-98.7  F (37.1  C)] 97.5  F (36.4  C)  Pulse:  [] 100  Resp:  [12-27] 17  BP: (120-154)/(76-95) 120/94  SpO2:  [95 %-98 %] 96 %    Temperatures:  Current - Temp: 97.5  F (36.4  C); Max - Temp  Av.2  F (36.8  C)  Min: 97.5  F (36.4  C)  Max: 98.7  F (37.1  C)  Respiration range: Resp  Av.9  Min: 12  Max: 27  Pulse range: Pulse  Av.7  Min: 98  Max: 109  Blood pressure range: Systolic (24hrs), Av , Min:120 , Max:154   ; Diastolic (24hrs), Av, Min:76, Max:95    Pulse oximetry range: SpO2  Av.7 %  Min: 95 %  Max: 98 %    I/O last 3 completed shifts:  In: 4635 [P.O.:700; I.V.:3935]  Out: 750 [Urine:750]      Intake/Output Summary (Last 24 hours) at 1/15/2024 1050  Last data filed at 1/15/2024 0400  Gross per 24 hour   Intake 4635 ml   Output 750 ml   Net 3885 ml       Alert and talkative  Lungs with clear ant BS  Cor RRR nl  S1 S2 no M, no JVD  Abd distended and tympanitic  LE trace edema, good pulses.        Wt Readings from Last 4 Encounters:   24 97.5 kg (215 lb)          Data:          Lab Results   Component Value Date     01/15/2024     01/15/2024     2024    Lab Results   Component Value Date    CHLORIDE 94 01/15/2024    CHLORIDE 92 2024    CHLORIDE 87 2024    Lab Results   Component Value Date    BUN 84.9 01/15/2024    BUN 75.9 2024    BUN 68.4 2024       Lab Results   Component Value Date    POTASSIUM 4.3 01/15/2024    POTASSIUM 4.6 01/14/2024    POTASSIUM 4.4 01/13/2024    Lab Results   Component Value Date    CO2 16 01/15/2024    CO2 16 01/14/2024    CO2 22 01/13/2024    Lab Results   Component Value Date    CR 4.24 01/15/2024    CR 3.61 01/14/2024    CR 3.27 01/14/2024        Recent Labs   Lab Test 01/15/24  0548 01/14/24  1825 01/14/24  0546 01/13/24  1333   WBC 13.2*  --  13.0* 15.9*   HGB 9.9* 11.2* 11.8* 14.3   HCT 28.5*  --  33.5* 40.5   MCV 85  --  84 84     --  196 227     Recent Labs   Lab Test 01/15/24  0548 01/14/24  0546 01/13/24  1333   AST 94* 106* 133*   ALT 71* 81* 112*   ALKPHOS 73 79 106   BILITOTAL 2.2* 3.7* 6.1*   RIAN  --   --  14*       Recent Labs   Lab Test 01/15/24  0548 01/14/24  0546 01/13/24  1735   MAG 2.5* 2.5* 1.3*     Recent Labs   Lab Test 01/15/24  0548   PHOS 5.3*     Recent Labs   Lab Test 01/15/24  0548 01/14/24  0546 01/13/24  1333   LUBNA 6.5* 6.6* 7.2*       Lab Results   Component Value Date    LUBNA 6.5 (L) 01/15/2024     Lab Results   Component Value Date    WBC 13.2 (H) 01/15/2024    HGB 9.9 (L) 01/15/2024    HCT 28.5 (L) 01/15/2024    MCV 85 01/15/2024     01/15/2024     Lab Results   Component Value Date     (L) 01/15/2024     (L) 01/15/2024    POTASSIUM 4.3 01/15/2024    CHLORIDE 94 (L) 01/15/2024    CO2 16 (L) 01/15/2024     (H) 01/15/2024     Lab Results   Component Value Date    BUN 84.9 (H) 01/15/2024    CR 4.24 (H) 01/15/2024     Lab Results   Component Value Date    MAG 2.5 (H) 01/15/2024     Lab Results   Component Value Date    PHOS 5.3 (H) 01/15/2024       Creatinine   Date Value Ref Range Status   01/15/2024 4.24 (H) 0.67 - 1.17 mg/dL Final   01/14/2024 3.61 (H) 0.67 - 1.17 mg/dL Final   01/14/2024 3.27 (H) 0.67 - 1.17 mg/dL Final   01/14/2024 3.06 (H) 0.67 - 1.17 mg/dL Final   01/13/2024 2.50 (H) 0.67 - 1.17 mg/dL Final       Attestation:  I have reviewed today's vital signs,  notes, medications, labs and imaging.     Keenan Segovia MD

## 2024-01-16 LAB
ALBUMIN SERPL BCG-MCNC: 2.8 G/DL (ref 3.5–5.2)
ALP SERPL-CCNC: 70 U/L (ref 40–150)
ALT SERPL W P-5'-P-CCNC: 61 U/L (ref 0–70)
ANION GAP SERPL CALCULATED.3IONS-SCNC: 13 MMOL/L (ref 7–15)
AST SERPL W P-5'-P-CCNC: 72 U/L (ref 0–45)
BASOPHILS # BLD AUTO: ABNORMAL 10*3/UL
BASOPHILS # BLD MANUAL: 0 10E3/UL (ref 0–0.2)
BASOPHILS NFR BLD AUTO: ABNORMAL %
BASOPHILS NFR BLD MANUAL: 0 %
BILIRUB SERPL-MCNC: 1.7 MG/DL
BUN SERPL-MCNC: 84.7 MG/DL (ref 6–20)
CALCIUM SERPL-MCNC: 6.9 MG/DL (ref 8.6–10)
CHLORIDE SERPL-SCNC: 94 MMOL/L (ref 98–107)
CREAT SERPL-MCNC: 4.36 MG/DL (ref 0.67–1.17)
DEPRECATED HCO3 PLAS-SCNC: 20 MMOL/L (ref 22–29)
EGFRCR SERPLBLD CKD-EPI 2021: 16 ML/MIN/1.73M2
EOSINOPHIL # BLD AUTO: ABNORMAL 10*3/UL
EOSINOPHIL # BLD MANUAL: 0.2 10E3/UL (ref 0–0.7)
EOSINOPHIL NFR BLD AUTO: ABNORMAL %
EOSINOPHIL NFR BLD MANUAL: 1 %
ERYTHROCYTE [DISTWIDTH] IN BLOOD BY AUTOMATED COUNT: 16.3 % (ref 10–15)
GLUCOSE SERPL-MCNC: 94 MG/DL (ref 70–99)
HCT VFR BLD AUTO: 27.8 % (ref 40–53)
HGB BLD-MCNC: 9.5 G/DL (ref 13.3–17.7)
IMM GRANULOCYTES # BLD: ABNORMAL 10*3/UL
IMM GRANULOCYTES NFR BLD: ABNORMAL %
LACTATE SERPL-SCNC: 0.6 MMOL/L (ref 0.7–2)
LYMPHOCYTES # BLD AUTO: ABNORMAL 10*3/UL
LYMPHOCYTES # BLD MANUAL: 0.7 10E3/UL (ref 0.8–5.3)
LYMPHOCYTES NFR BLD AUTO: ABNORMAL %
LYMPHOCYTES NFR BLD MANUAL: 4 %
MAGNESIUM SERPL-MCNC: 2.5 MG/DL (ref 1.7–2.3)
MCH RBC QN AUTO: 29.5 PG (ref 26.5–33)
MCHC RBC AUTO-ENTMCNC: 34.2 G/DL (ref 31.5–36.5)
MCV RBC AUTO: 86 FL (ref 78–100)
METAMYELOCYTES # BLD MANUAL: 0.7 10E3/UL
METAMYELOCYTES NFR BLD MANUAL: 4 %
MONOCYTES # BLD AUTO: ABNORMAL 10*3/UL
MONOCYTES # BLD MANUAL: 1.7 10E3/UL (ref 0–1.3)
MONOCYTES NFR BLD AUTO: ABNORMAL %
MONOCYTES NFR BLD MANUAL: 9 %
MYELOCYTES # BLD MANUAL: 0.6 10E3/UL
MYELOCYTES NFR BLD MANUAL: 3 %
NEUTROPHILS # BLD AUTO: ABNORMAL 10*3/UL
NEUTROPHILS # BLD MANUAL: 14.5 10E3/UL (ref 1.6–8.3)
NEUTROPHILS NFR BLD AUTO: ABNORMAL %
NEUTROPHILS NFR BLD MANUAL: 79 %
NRBC # BLD AUTO: 0 10E3/UL
NRBC BLD AUTO-RTO: 0 /100
PLAT MORPH BLD: ABNORMAL
PLATELET # BLD AUTO: 303 10E3/UL (ref 150–450)
POTASSIUM SERPL-SCNC: 3.9 MMOL/L (ref 3.4–5.3)
PROT SERPL-MCNC: 5.7 G/DL (ref 6.4–8.3)
RBC # BLD AUTO: 3.22 10E6/UL (ref 4.4–5.9)
RBC MORPH BLD: ABNORMAL
SODIUM SERPL-SCNC: 127 MMOL/L (ref 135–145)
TARGETS BLD QL SMEAR: SLIGHT
TOXIC GRANULES BLD QL SMEAR: PRESENT
WBC # BLD AUTO: 18.4 10E3/UL (ref 4–11)

## 2024-01-16 PROCEDURE — 85027 COMPLETE CBC AUTOMATED: CPT | Performed by: INTERNAL MEDICINE

## 2024-01-16 PROCEDURE — 250N000013 HC RX MED GY IP 250 OP 250 PS 637: Performed by: INTERNAL MEDICINE

## 2024-01-16 PROCEDURE — 99233 SBSQ HOSP IP/OBS HIGH 50: CPT | Performed by: INTERNAL MEDICINE

## 2024-01-16 PROCEDURE — 250N000011 HC RX IP 250 OP 636: Performed by: INTERNAL MEDICINE

## 2024-01-16 PROCEDURE — 120N000013 HC R&B IMCU

## 2024-01-16 PROCEDURE — 80053 COMPREHEN METABOLIC PANEL: CPT | Performed by: INTERNAL MEDICINE

## 2024-01-16 PROCEDURE — 99232 SBSQ HOSP IP/OBS MODERATE 35: CPT | Performed by: INTERNAL MEDICINE

## 2024-01-16 PROCEDURE — 250N000013 HC RX MED GY IP 250 OP 250 PS 637: Performed by: PAIN MEDICINE

## 2024-01-16 PROCEDURE — 250N000009 HC RX 250: Performed by: INTERNAL MEDICINE

## 2024-01-16 PROCEDURE — 83735 ASSAY OF MAGNESIUM: CPT | Performed by: INTERNAL MEDICINE

## 2024-01-16 PROCEDURE — 258N000002 HC RX IP 258 OP 250: Performed by: INTERNAL MEDICINE

## 2024-01-16 PROCEDURE — 250N000011 HC RX IP 250 OP 636: Performed by: NURSE PRACTITIONER

## 2024-01-16 PROCEDURE — 250N000011 HC RX IP 250 OP 636

## 2024-01-16 PROCEDURE — 250N000011 HC RX IP 250 OP 636: Performed by: SPECIALIST

## 2024-01-16 PROCEDURE — C9113 INJ PANTOPRAZOLE SODIUM, VIA: HCPCS | Performed by: INTERNAL MEDICINE

## 2024-01-16 PROCEDURE — 250N000013 HC RX MED GY IP 250 OP 250 PS 637: Performed by: HOSPITALIST

## 2024-01-16 PROCEDURE — 36415 COLL VENOUS BLD VENIPUNCTURE: CPT | Performed by: INTERNAL MEDICINE

## 2024-01-16 PROCEDURE — 99232 SBSQ HOSP IP/OBS MODERATE 35: CPT | Performed by: SPECIALIST

## 2024-01-16 PROCEDURE — 85007 BL SMEAR W/DIFF WBC COUNT: CPT | Performed by: INTERNAL MEDICINE

## 2024-01-16 PROCEDURE — 83605 ASSAY OF LACTIC ACID: CPT | Performed by: INTERNAL MEDICINE

## 2024-01-16 PROCEDURE — 99418 PROLNG IP/OBS E/M EA 15 MIN: CPT | Performed by: INTERNAL MEDICINE

## 2024-01-16 PROCEDURE — 99207 PR APP CREDIT; MD BILLING SHARED VISIT: CPT | Performed by: NURSE PRACTITIONER

## 2024-01-16 PROCEDURE — 250N000013 HC RX MED GY IP 250 OP 250 PS 637: Performed by: NURSE PRACTITIONER

## 2024-01-16 RX ORDER — HYDROMORPHONE HYDROCHLORIDE 2 MG/1
4 TABLET ORAL
Status: DISCONTINUED | OUTPATIENT
Start: 2024-01-16 | End: 2024-01-22

## 2024-01-16 RX ORDER — PIPERACILLIN SODIUM, TAZOBACTAM SODIUM 2; .25 G/10ML; G/10ML
2.25 INJECTION, POWDER, LYOPHILIZED, FOR SOLUTION INTRAVENOUS EVERY 6 HOURS
Status: DISCONTINUED | OUTPATIENT
Start: 2024-01-16 | End: 2024-01-17

## 2024-01-16 RX ORDER — HYDROMORPHONE HYDROCHLORIDE 1 MG/ML
0.5 INJECTION, SOLUTION INTRAMUSCULAR; INTRAVENOUS; SUBCUTANEOUS
Status: DISCONTINUED | OUTPATIENT
Start: 2024-01-16 | End: 2024-01-18

## 2024-01-16 RX ORDER — HYDROMORPHONE HYDROCHLORIDE 2 MG/1
2 TABLET ORAL
Status: DISCONTINUED | OUTPATIENT
Start: 2024-01-16 | End: 2024-01-22

## 2024-01-16 RX ORDER — CALCIUM GLUCONATE 20 MG/ML
2 INJECTION, SOLUTION INTRAVENOUS ONCE
Status: COMPLETED | OUTPATIENT
Start: 2024-01-16 | End: 2024-01-16

## 2024-01-16 RX ADMIN — PIPERACILLIN AND TAZOBACTAM 2.25 G: 2; .25 INJECTION, POWDER, FOR SOLUTION INTRAVENOUS at 21:17

## 2024-01-16 RX ADMIN — MELATONIN TAB 3 MG 3 MG: 3 TAB at 21:17

## 2024-01-16 RX ADMIN — CALCIUM GLUCONATE 2 G: 20 INJECTION, SOLUTION INTRAVENOUS at 10:49

## 2024-01-16 RX ADMIN — PIPERACILLIN AND TAZOBACTAM 2.25 G: 2; .25 INJECTION, POWDER, FOR SOLUTION INTRAVENOUS at 00:09

## 2024-01-16 RX ADMIN — HYDROMORPHONE HYDROCHLORIDE 4 MG: 2 TABLET ORAL at 04:49

## 2024-01-16 RX ADMIN — HYDROMORPHONE HYDROCHLORIDE 4 MG: 2 TABLET ORAL at 00:09

## 2024-01-16 RX ADMIN — HYDROMORPHONE HYDROCHLORIDE 4 MG: 2 TABLET ORAL at 09:27

## 2024-01-16 RX ADMIN — HYDROMORPHONE HYDROCHLORIDE 0.5 MG: 1 INJECTION, SOLUTION INTRAMUSCULAR; INTRAVENOUS; SUBCUTANEOUS at 15:54

## 2024-01-16 RX ADMIN — PIPERACILLIN AND TAZOBACTAM 2.25 G: 2; .25 INJECTION, POWDER, FOR SOLUTION INTRAVENOUS at 15:14

## 2024-01-16 RX ADMIN — HYDROMORPHONE HYDROCHLORIDE 0.3 MG: 1 INJECTION, SOLUTION INTRAMUSCULAR; INTRAVENOUS; SUBCUTANEOUS at 02:00

## 2024-01-16 RX ADMIN — PANTOPRAZOLE SODIUM 40 MG: 40 INJECTION, POWDER, FOR SOLUTION INTRAVENOUS at 21:17

## 2024-01-16 RX ADMIN — METHOCARBAMOL 500 MG: 500 TABLET ORAL at 21:17

## 2024-01-16 RX ADMIN — PANTOPRAZOLE SODIUM 40 MG: 40 INJECTION, POWDER, FOR SOLUTION INTRAVENOUS at 09:27

## 2024-01-16 RX ADMIN — HYDROMORPHONE HYDROCHLORIDE 4 MG: 2 TABLET ORAL at 12:57

## 2024-01-16 RX ADMIN — HYDROMORPHONE HYDROCHLORIDE 4 MG: 2 TABLET ORAL at 17:55

## 2024-01-16 RX ADMIN — PIPERACILLIN AND TAZOBACTAM 2.25 G: 2; .25 INJECTION, POWDER, FOR SOLUTION INTRAVENOUS at 09:27

## 2024-01-16 RX ADMIN — LIDOCAINE: 50 OINTMENT TOPICAL at 21:17

## 2024-01-16 RX ADMIN — LIDOCAINE: 50 OINTMENT TOPICAL at 09:37

## 2024-01-16 RX ADMIN — METHOCARBAMOL 500 MG: 500 TABLET ORAL at 06:37

## 2024-01-16 RX ADMIN — HYDROMORPHONE HYDROCHLORIDE 0.5 MG: 1 INJECTION, SOLUTION INTRAMUSCULAR; INTRAVENOUS; SUBCUTANEOUS at 21:06

## 2024-01-16 RX ADMIN — LIDOCAINE: 50 OINTMENT TOPICAL at 17:55

## 2024-01-16 RX ADMIN — HYDROMORPHONE HYDROCHLORIDE 4 MG: 2 TABLET ORAL at 23:34

## 2024-01-16 RX ADMIN — SODIUM BICARBONATE: 84 INJECTION, SOLUTION INTRAVENOUS at 12:07

## 2024-01-16 ASSESSMENT — ACTIVITIES OF DAILY LIVING (ADL)
ADLS_ACUITY_SCORE: 20

## 2024-01-16 NOTE — PLAN OF CARE
Orientations: A&O x4  Vitals/Pain: VS on RA  Pain managed w/ robaxin x2, oral dilauded x3, IV dilauded x1, heat, and frequent repositioning  Tele: SR/ST  Lines/Drains: PIV x2   0.45% NS w/ sodium bicarb @ 100ml/hr  GI/: Abdomen firm/distended, BS +, several small clear/watery stools and 1 small loose/brown stool.  Diet: NPO ex/ meds and ice chips  Labs: K/mag protocols  Cr 4.36  Na 127  Ambulation/Assist: SBA  Sleep Quality: Fair  Plan: Surgery, GI, Nephrology following

## 2024-01-16 NOTE — PLAN OF CARE
Notified provider about indwelling hollingsworth catheter discussed removal or continued need.    Did provider choose to remove indwelling hollingsworth catheter? No    Provider's hollingsworth indication for keeping indwelling hollingsworth catheter: Retention.    Is there an order for indwelling hollingsworth catheter? Yes

## 2024-01-16 NOTE — PLAN OF CARE
Notified provider about indwelling hollingsworth catheter discussed removal or continued need.    Did provider choose to remove indwelling hollingsworth catheter? NO    Provider's hollingsworth indication for keeping indwelling hollingsworth catheter: Indication for continued use: Strict 1-2 Hour I & O if external catheters are not an option    Is there an order for indwelling hollingsworth catheter? YES    *If there is a plan to keep hollingsworth catheter in place at discharge daily notification with provider is not necessary, but please add a notation in the treatment team sticky note that the patient will be discharging with the catheter.

## 2024-01-16 NOTE — PROGRESS NOTES
[facility]    Medicine Progress Note - Hospitalist Service    Date of Admission:  1/13/2024    Assessment & Plan   Pt is 28-year-old male with no significant past medical history presents with 2-day history of abdominal bloating,     Acute hemorrhagic biliary pancreatitis.  Cholelithiasis w/o cholecystitis.  Choledocholithiasis with 3 cm CBD dilatation.  Elevated LFTs.  CT abdomen -extensive signs of acute pancreatitis, no choledocholithiasis noted but Cholelithiasis noted without cholecystitis.  Ultrasound of the abdomen was also obtained which did not indicate any choledocholithiasis.  Initial lipase 776.  White count 15.1 at presentation.  Bilirubin 6.1  [facility] following, appreciate input  -MRCP-with 2 choledocholithiasis with distal common bile duct measuring up to 3 mm with mild upstream biliary ductal dilatation.  Acute hemorrhagic pancreatitis with extensive peripancreatic inflammation and fluid as described, cholelithiasis without evidence of acute cholecystitis.  Reactive edema within the proximal duodenum related to pancreatitis  -ERCP-1/14 choledocholithiasis was found with complete removal with biliary sphincterotomy and biliary stent placed given severe edema.  Pancreatic stent placed.  Severe erosive gastropathy and duodenopathy was also found  -LFTs/total bilirubin improving and is 2.2 today  -IV PPI every 12 hours  Abdominal x-ray in 10 days to evaluate for retained pancreatic stent.  ERCP in 4 to 6 weeks to remove biliary stent.  -Continue hydration, n.p.o.  -Surgery following, appreciate input, plan is for laparoscopic cholecystectomy at some point of time  -Given ongoing pain, distention and worsening renal function, surgery evaluating to see if CT needs to be repeated to evaluate for pancreatic fluid collection/necrosis.  Defer timing to surgery  -Continue ambulation so as to get bowels moving  -Appreciate pain management input  -Continue lidocaine patch, pain  management adjusting his pain regimen.  -Patient had positive urine drug screen for fentanyl benzodiazepine and opiates on 1/14/2024 however patient had received Versed fentanyl and Dilaudid prior to that     Acute kidney injury  Hyponatremia  Nonanion gap metabolic acidosis, likely secondary to BRENDA    creatinine of 2.5 and sodium of 122 at presentation  -  Renal function gradually worsening and is 4.36 this morning, sodium is 127.  Bicarb improved to 20 on bicarb containing fluids  -Patient making adequate urine  -nephrology following, appreciate input  -Avoid nephrotoxins    Hypocalcemia  -Calcium this morning is 6.9, corrected calcium for albumin is 7.5  -Will check ionized calcium and if low replace it    Leukocytosis  -Leukocytosis likely from acute stress, patient has remained afebrile  -Has been started on Zosyn, blood cultures negative to date  -Plan for CT abdomen pelvis as above, if leukocytosis continues to get worsen will order sooner    Anemia  Unknown baseline  Hemoglobin was normal at presentation, gradually dropping likely secondary to hydration, hemoglobin this morning is 9.5  Patient denies any active bleed, monitor for any further drop  -During ERCP patient was noted to have severe erosive gastropathy and duodenopathy as above  -Patient has been started on Protonix IV twice daily, will continue        Diet: NPO for Medical/Clinical Reasons Except for: Meds, Ice Chips    DVT Prophylaxis: Pneumatic Compression Devices  Cornejo Catheter: PRESENT, indication: Strict 1-2 Hour I&O  Lines: None     Cardiac Monitoring: None  Code Status: Full Code      Clinically Significant Risk Factors         # Hyponatremia: Lowest Na = 124 mmol/L in last 2 days, will monitor as appropriate      # Hypoalbuminemia: Lowest albumin = 2.7 g/dL at 1/14/2024  5:46 AM, will monitor as appropriate      # Acute Kidney Injury, unspecified: based on a >150% or 0.3 mg/dL increase in last creatinine compared to past 90 day average,  will monitor renal function         # Obesity: Estimated body mass index is 34.14 kg/m  as calculated from the following:    Height as of this encounter: 1.829 m (6').    Weight as of this encounter: 114.2 kg (251 lb 11.2 oz)., PRESENT ON ADMISSION            Disposition Plan      Expected Discharge Date: 01/18/2024      Destination: home with family              Rylee Colón MD  Hospitalist Service  [facility]  Securely message with [employer] (more info)  Text page via [employer] Paging/Directory   ______________________________________________________________________    Interval History   Patient reported he had a better night last night and was able to sleep after lidocaine patch and his back pain is better.  He has been ambulating and having a bowel movement.  No other nursing concerns.  Discussed treatment plan with patient, pain management provider and pharmacist.      Physical Exam   Vital Signs: Temp: 98.2  F (36.8  C) Temp src: Oral BP: (!) 149/117 Pulse: 110   Resp: 22 SpO2: 96 % O2 Device: None (Room air)    Weight: 251 lbs 11.2 oz    Exam:  Constitutional: Awake, alert and no distress. Appears comfortable  Head: Normocephalic. No masses, lesions, tenderness or abnormalities  ENMT: ENT exam normal, no neck nodes or sinus tenderness  Cardiovascular: RRR.  no murmurs, no rubs or JVD  Respiratory:normal WOB,b/l equal air entry, no wheezes or crackles   Gastrointestinal: Abdomen distended, generalized tenderness, no peritoneal signs. BS hypoactive. No masses, organomegaly  : Deferred  Extremities :no edema , no clubbing or cyanosis      Medical Decision Making       51 MINUTES SPENT BY ME on the date of service doing chart review, history, exam, documentation & further activities per the note.      Data     I have personally reviewed the following data over the past 24 hrs:    18.4 (H)  \   9.5 (L)   / 303     127 (L) 94 (L) 84.7 (H) /  94   3.9 20 (L) 4.36 (H) \     ALT: 61 AST: 72 (H)  AP: 70 TBILI: 1.7 (H)   ALB: 2.8 (L) TOT PROTEIN: 5.7 (L) LIPASE: N/A       Imaging results reviewed over the past 24 hrs:   No results found for this or any previous visit (from the past 24 hour(s)).  Recent Labs   Lab 01/16/24  0528 01/15/24  0548 01/14/24  2346 01/14/24  1825 01/14/24  1304 01/14/24  0546 01/13/24  1735 01/13/24  1333   WBC 18.4* 13.2*  --   --   --  13.0*  --  15.9*   HGB 9.5* 9.9*  --  11.2*  --  11.8*  --  14.3   MCV 86 85  --   --   --  84  --  84    253  --   --   --  196  --  227   * 125*  125* 126* 126* 124* 124*   < > 122*   POTASSIUM 3.9 4.3  --   --   --  4.6  --  4.4   CHLORIDE 94* 94*  --   --   --  92*  --  87*   CO2 20* 16*  --   --   --  16*  --  22   BUN 84.7* 84.9*  --   --   --  75.9*  --  68.4*   CR 4.36* 4.24*  --   --  3.61* 3.27*   < > 2.50*   ANIONGAP 13 15  --   --   --  16*  --  13   LUBNA 6.9* 6.5*  --   --   --  6.6*  --  7.2*   GLC 94 114*  --   --   --  114*   < > 141*   ALBUMIN 2.8* 2.8*  2.8*  --   --   --  2.7*  --  3.4*   PROTTOTAL 5.7* 5.7*  --   --   --  5.7*  --  6.6   BILITOTAL 1.7* 2.2*  --   --   --  3.7*  --  6.1*   ALKPHOS 70 73  --   --   --  79  --  106   ALT 61 71*  --   --   --  81*  --  112*   AST 72* 94*  --   --   --  106*  --  133*   LIPASE  --   --   --   --   --   --   --  776*    < > = values in this interval not displayed.

## 2024-01-16 NOTE — PROGRESS NOTES
"[facility] ACUTE PAIN SERVICE    [facility]   Daily PAIN Progress Note        Addendum: Nurse notified by nurse at 12:52 pain not well controlled and asking to reinstated IV dilaudid. Oral dilaudid not lasting 4 hrs.   Reordered Hydromorphone ( Dilaudid) IV 0.5 mg domenic 2 hrs prn and changed carin to Q3 hrs prn for mod and severe indication.  Veronica Irizarry, RN-C,APRN,CNP,ACHPN   [facility] Paging/Directory Pain  Securely message with the Vocera Web Console (learn more here)  (When I saw the patient): 01/16/24  Assessment/Plan:  Talha Alexander seeing in follow up for acute pancreatitis and acute kidney injury we are seeing him for acute abdominal pain with pain onset about 6 days ago .He reports he noted a \"yellowness to eyes and stools were gray. He he a healthy middle male with past medical history of obesity and tobacco dependence, snoring. He does snore and has no history of substance use disorder rarely drinks alcohol. His abdominal pain is significantly improved from yesterday. Labs are trending downward. He had had BM and urinating well, tolerating ice chips, skin color normal. Appreciate in put from general surgery, nephrology and gastroenterology. Counseled patient  on triggers that increase recurrence of pancreatitis. These include obesity, tobacco use and alcohol.  He was also encouraged to establish with a primary care provider and would also benefit from evaluation for sleep disordered breathing        Opioid Induced Respiratory Depression Risk Assessment:  (Low 0-1; Moderate 2-4; or High >4 or >/= 3 if two of the risk factors are age > 60 and opioid naive) due to the following risk factors: ROSA, COPD/Asthma/pulmonary disease, CHF, renal dysfunction, hepatic dysfunction, Obesity, Smoker, Age>60, >2 opioid therapies, concomitant CNS depressants, opioid naive status, or post surgical   ?   [place] pulled from system no controlled substance in last 12 months.   Chronic opioid use = 0 mg MME, " "opioid used this past 24 hours in the form of oral 18 mg Hydromorphone oral and  IV.3.2 mg = 136 mg MME    PLAN:   1) Pain is consistent with visceral, secondary to acute pancreatitis , in the setting of obesity and tobacco dependence. Treatment plan includes multimodal pain approach, medications as listed below, reviewed.  Discharge medications, advised keeping track of doses, educated on tapering off as pain improves, watch for constipation and stool softeners, no driving or alcohol with opioids, store medications in a safe place, advised to take no more than the prescribed dose as increased doses may cause respiratory depression or death. Patient is understanding of the plan. All questions and concerns addressed to patient's satisfaction.     2)Multimodal Medication Therapy  Topical:  lidocaine ointment tid  Adjuvants: CrCl is 27.6  mg/dl\", \"Tylenol as prn \", Hydroxyzine  not indicated  Muscle relaxer's not indicated. \" Gapapentin not indicated.   Antidepressants/anxiolytics: none     Opioids: Hydromorphone(Dilaudid) 2-4 mg every 4 hrs prn   IV Pain medication: {Blank multiple:18974::\"Dilaudid discontinue.     3) Non-medication interventions- Ice, Heat, physical therapy, distraction aroma therapy   4) Interdisciplinary team care: Appreciate in put from general surgery, nephrology and gastroenterology.   5)Constipation Prophylaxis- senna and miralax . Continue to monitor.   6) Follow up   -acute pain team will continue to follow peripherally for next day if pain controlled will sign off.   -Opioid prescriber has been none. PCP is none     -Discharge Recommendations - We recommend prescribing the following at the time of discharge:    Dilaudid 2 mg every 4 hrs prn  #10 tablet(s)  Follow up with GI Nephrology   Establish with PCP tobacco cessation,weight loss sleep study evaluation   Disposition:     Prescribing at discharge: hospitalist      Subjective:  Pain better controlled  Tolerating ice chips no nausea or " vomiting  BM's less gray   Abdomen distended             BRENDA (acute kidney injury) (H24)   Patient Active Problem List   Diagnosis    Acute pancreatitis    Calculus of gallbladder without cholecystitis    Hyponatremia    Gallstone pancreatitis    Elevated LFTs    BRENDA (acute kidney injury) (H24)    Acute biliary pancreatitis, unspecified complication status        History   Drug Use Not on file         Tobacco Use      Smoking status: None      Smokeless tobacco: None         calcium gluconate  2 g Intravenous Once    lactated ringers  1,000 mL Intravenous Once    lidocaine   Topical TID    nicotine  1 patch Transdermal Daily    nicotine   Transdermal Q8H    pantoprazole  40 mg Intravenous BID    piperacillin-tazobactam  2.25 g Intravenous Q6H    sodium chloride (PF)  3 mL Intracatheter Q8H    sodium chloride (PF)  3 mL Intracatheter Q8H       Objective:  Vital signs in last 24 hours:  B/P: 139/93, T: 98.2, P: 101, R: 19   Blood pressure (!) 139/93, pulse 101, temperature 98.2  F (36.8  C), temperature source Oral, resp. rate 19, height 1.829 m (6'), weight 114.2 kg (251 lb 11.2 oz), SpO2 96%.      Weight:   Wt Readings from Last 3 Encounters:   01/16/24 114.2 kg (251 lb 11.2 oz)           Intake/Output:    Intake/Output Summary (Last 24 hours) at 1/16/2024 0941  Last data filed at 1/16/2024 0638  Gross per 24 hour   Intake 1715 ml   Output 1900 ml   Net -185 ml        Review of Systems:   As per subjective, all others negative.    Physical Exam:     General Appearance:  Alert, cooperative, no distress. Patient is  pleasant  grooming is good   Head:  Normocephalic, without obvious abnormality, atraumatic   Eyes:   Conjunctiva/corneas clear, EOM's intact   ENT/Throat: Lips, mouth moist     Lymph/Neck: Symmetrical, trachea midline, no adenopathy, thyroid: not enlarged, symmetric    Lungs:   Clear to auscultation bilaterally, respirations unlabored, even chest rise. Symmetrical movement    Chest Wall:  No tenderness or  deformity   Cardiovascular/Heart:  Regular rate and rhythm, S1, S2 normal,no murmur, rub or gallop.     Abdomen:   Soft, non-tender, bowel sounds active all four quadrants,  no masses, no organomegaly   Musculoskeletal: Extremities normal, atraumatic  Incision none    Skin: Skin color good , lesions  none    Neurologic: Alert and oriented X 3, Moves all 4 extremities        Psych: Affect is  normal  aberrant pain behaviors, No             Imaging:  Personally Reviewed.        Results for orders placed or performed during the hospital encounter of 01/13/24   US Abdomen Limited    Impression    IMPRESSION:  1.  Cholelithiasis.  2.  Mildly heterogeneous liver with mild hepatic steatosis.  3.  Small ascites.  4.  Known pancreatitis was better assessed on the CT earlier today.       CT Abdomen Pelvis w/o Contrast    Impression    IMPRESSION:     1.  Acute pancreatitis with extensive peripancreatic inflammation as described. No organized peripancreatic fluid collections. This noncontrast exam cannot evaluate for parenchymal necrosis.    2.  Cholelithiasis. No biliary ductal dilation.    3.  Reactive wall thickening of the duodenum related to the pancreatitis.    4.  Small amount of ascites.    5.  Trace left pleural effusion with adjacent atelectasis.   MR Abdomen MRCP without Contrast    Impression    IMPRESSION:    1.  At least 2 choledocholiths within the distal common bile duct measuring up to 3 mm with mild upstream biliary ductal dilation.    2.  Acute hemorrhagic pancreatitis with extensive peripancreatic inflammation and fluid as described. No organized peripancreatic fluid collections. No pancreatic ductal dilation. This noncontrast exam cannot evaluate for parenchymal necrosis.    3.  Cholelithiasis, without evidence of acute cholecystitis.    4.  Reactive edema within the proximal duodenum related to the pancreatitis. A mildly dilated left upper quadrant jejunal loop is also likely a focal ileus related to the  pancreatitis.    5.  Small amount of ascites.    6.  Trace left pleural effusion with adjacent atelectasis.          Lab Results:  Personally Reviewed. Urine drug screen as expected received Fentanyl versed and dilaudid prior to sample   Last Comprehensive Metabolic Panel:  Sodium   Date Value Ref Range Status   01/16/2024 127 (L) 135 - 145 mmol/L Final     Comment:     Reference intervals for this test were updated on 09/26/2023 to more accurately reflect our healthy population. There may be differences in the flagging of prior results with similar values performed with this method. Interpretation of those prior results can be made in the context of the updated reference intervals.      Potassium   Date Value Ref Range Status   01/16/2024 3.9 3.4 - 5.3 mmol/L Final     Chloride   Date Value Ref Range Status   01/16/2024 94 (L) 98 - 107 mmol/L Final     Carbon Dioxide (CO2)   Date Value Ref Range Status   01/16/2024 20 (L) 22 - 29 mmol/L Final     Anion Gap   Date Value Ref Range Status   01/16/2024 13 7 - 15 mmol/L Final     Glucose   Date Value Ref Range Status   01/16/2024 94 70 - 99 mg/dL Final     GLUCOSE BY METER POCT   Date Value Ref Range Status   01/13/2024 111 (H) 70 - 99 mg/dL Final     Urea Nitrogen   Date Value Ref Range Status   01/16/2024 84.7 (H) 6.0 - 20.0 mg/dL Final     Creatinine   Date Value Ref Range Status   01/16/2024 4.36 (H) 0.67 - 1.17 mg/dL Final     GFR Estimate   Date Value Ref Range Status   01/16/2024 16 (L) >60 mL/min/1.73m2 Final     Calcium   Date Value Ref Range Status   01/16/2024 6.9 (L) 8.6 - 10.0 mg/dL Final        UA:   Amphetamines Urine   Date Value Ref Range Status   01/14/2024 Screen Negative Screen Negative Final     Comment:     Cutoff for a negative amphetamine is less than 500 ng/mL.     Barbituates Urine   Date Value Ref Range Status   01/14/2024 Screen Negative Screen Negative Final     Comment:     Cutoff for a negative barbiturate is less than 200 ng/mL.      Cannabinoids Urine   Date Value Ref Range Status   01/14/2024 Screen Negative Screen Negative Final     Comment:     Cutoff for a negative cannabinoid is less than 50 ng/mL.     Cocaine Urine   Date Value Ref Range Status   01/14/2024 Screen Negative Screen Negative Final     Comment:     Cutoff for a negative cocaine is less than 300 ng/mL.     Opiates Urine   Date Value Ref Range Status   01/14/2024 Screen Positive (A) Screen Negative Final     Comment:     Cutoff for a positive opiate is 300 ng/mL or greater.  This is an unconfirmed screening result to be used for medical purposes only.     PCP Urine   Date Value Ref Range Status   01/14/2024 Screen Negative Screen Negative Final     Comment:     Cutoff for a negative PCP is less than 25 ng/mL.            Critical decision making elements:  Use of high risk medications: Yes, Ongoing monitoring for adverse effects of medications by me: Yes, Relevant labs, imaging, notes reviewed by me:  Yes   Please see A&P for additional details of medical decision making.  35  MINUTES SPENT BY ME on the date of service doing chart review, history, exam, documentation & further activities per the note.  Communicated plan with hospitalist managing care? Yes  Communicated plan with bedside nurse?  Yes    Veronica Irizarry, MORENA CNP, PGMT-BC, ACHPN  [facility]   Coverage Monday-Friday 8:00-4:30  No weekend coverage contact house officer  [facility] Paging/Directory Pain  Securely message with the Vocera Web Console (learn more here)

## 2024-01-16 NOTE — PROGRESS NOTES
Assessment and Plan:     BRENDA: Cr rising but may be at plateau phase of ATN.   Na: 125 > 127. K 3.9, HCO3 16 > 20. Cr: 4.24 > 4.36. Alb 2.8.   UO 1800 ml yest.   Given 2 g CaGluc today, On IVF with HCO3.     Monitor labs  Cont IVF with HCO3            Interval History:   Gallstone pancreatitis: S/P ESRP and biliary stent, pancreatic stent placed T Bili :   6.1 > > 1.7.   ALT and AST improving.  On Zosyn, protonix.            Review of Systems:   On robaxin and dilaudid. NPO except meds. Cornejo in place. Ambulating in halls.          Medications:      calcium gluconate  2 g Intravenous Once    lactated ringers  1,000 mL Intravenous Once    lidocaine   Topical TID    nicotine  1 patch Transdermal Daily    nicotine   Transdermal Q8H    pantoprazole  40 mg Intravenous BID    piperacillin-tazobactam  2.25 g Intravenous Q6H    sodium chloride (PF)  3 mL Intracatheter Q8H    sodium chloride (PF)  3 mL Intracatheter Q8H      NaCl 0.45 % 1,000 mL with sodium bicarbonate 100 mEq/L infusion 100 mL/hr at 01/15/24 2329     Current active medications and PTA medications reviewed, see medication list for details.            Physical Exam:   Vitals were reviewed  Patient Vitals for the past 24 hrs:   BP Temp Temp src Pulse Resp SpO2 Weight   01/16/24 0930 (!) 128/92 -- -- 102 21 95 % --   01/16/24 0800 (!) 139/93 -- -- 101 19 96 % --   01/16/24 0740 -- 98.2  F (36.8  C) Oral -- -- -- --   01/16/24 0630 -- -- -- 99 18 95 % --   01/16/24 0600 (!) 149/117 -- -- 110 22 96 % --   01/16/24 0500 -- -- -- -- -- -- 114.2 kg (251 lb 11.2 oz)   01/16/24 0400 (!) 140/90 -- -- 96 12 93 % --   01/16/24 0200 124/80 -- -- 99 14 94 % --   01/16/24 0000 (!) 140/80 -- -- 99 14 94 % --   01/15/24 2200 122/87 98  F (36.7  C) Oral 101 15 92 % --   01/15/24 2100 -- -- -- 101 15 95 % --   01/15/24 2007 (!) 128/92 -- -- -- -- -- --   01/15/24 1800 (!) 142/96 -- -- 111 15 95 % --   01/15/24 1600 133/80 -- -- 101 12 92 % --   01/15/24 1501 -- 97.9  F  (36.6  C) Oral -- -- -- --   01/15/24 1200 128/81 -- -- 101 16 97 % --   01/15/24 1100 -- 97.7  F (36.5  C) Oral -- -- -- --       Temp:  [97.7  F (36.5  C)-98.2  F (36.8  C)] 98.2  F (36.8  C)  Pulse:  [] 102  Resp:  [12-22] 21  BP: (122-149)/() 128/92  SpO2:  [92 %-97 %] 95 %    Temperatures:  Current - Temp: 98.2  F (36.8  C); Max - Temp  Av  F (36.7  C)  Min: 97.7  F (36.5  C)  Max: 98.2  F (36.8  C)  Respiration range: Resp  Av.1  Min: 12  Max: 22  Pulse range: Pulse  Av.8  Min: 96  Max: 111  Blood pressure range: Systolic (24hrs), Av , Min:122 , Max:149   ; Diastolic (24hrs), Av, Min:80, Max:117    Pulse oximetry range: SpO2  Av.5 %  Min: 92 %  Max: 97 %    I/O last 3 completed shifts:  In: 1715 [I.V.:1715]  Out: 1900 [Urine:1900]      Intake/Output Summary (Last 24 hours) at 2024 1001  Last data filed at 2024 0638  Gross per 24 hour   Intake 1715 ml   Output 1900 ml   Net -185 ml       Alert and responsive  Lungs with clear ant BS  Cor RRR nl S1 S2 no M  Abd + BS, distended, soft  LE no edema       Wt Readings from Last 4 Encounters:   24 114.2 kg (251 lb 11.2 oz)          Data:          Lab Results   Component Value Date     2024     01/15/2024     01/15/2024    Lab Results   Component Value Date    CHLORIDE 94 2024    CHLORIDE 94 01/15/2024    CHLORIDE 92 2024    Lab Results   Component Value Date    BUN 84.7 2024    BUN 84.9 01/15/2024    BUN 75.9 2024      Lab Results   Component Value Date    POTASSIUM 3.9 2024    POTASSIUM 4.3 01/15/2024    POTASSIUM 4.6 2024    Lab Results   Component Value Date    CO2 20 2024    CO2 16 01/15/2024    CO2 16 2024    Lab Results   Component Value Date    CR 4.36 2024    CR 4.24 01/15/2024    CR 3.61 2024        Recent Labs   Lab Test 24  0528 01/15/24  0548 24  1825 24  0546   WBC 18.4* 13.2*  --  13.0*   HGB 9.5*  9.9* 11.2* 11.8*   HCT 27.8* 28.5*  --  33.5*   MCV 86 85  --  84    253  --  196     Recent Labs   Lab Test 01/16/24  0528 01/15/24  0548 01/14/24  0546 01/13/24  1333   AST 72* 94* 106* 133*   ALT 61 71* 81* 112*   ALKPHOS 70 73 79 106   BILITOTAL 1.7* 2.2* 3.7* 6.1*   RIAN  --   --   --  14*       Recent Labs   Lab Test 01/16/24  0528 01/15/24  0548 01/14/24  0546   MAG 2.5* 2.5* 2.5*     Recent Labs   Lab Test 01/15/24  0548   PHOS 5.3*     Recent Labs   Lab Test 01/16/24  0528 01/15/24  0548 01/14/24  0546   LUBNA 6.9* 6.5* 6.6*       Lab Results   Component Value Date    LUBNA 6.9 (L) 01/16/2024     Lab Results   Component Value Date    WBC 18.4 (H) 01/16/2024    HGB 9.5 (L) 01/16/2024    HCT 27.8 (L) 01/16/2024    MCV 86 01/16/2024     01/16/2024     Lab Results   Component Value Date     (L) 01/16/2024    POTASSIUM 3.9 01/16/2024    CHLORIDE 94 (L) 01/16/2024    CO2 20 (L) 01/16/2024    GLC 94 01/16/2024     Lab Results   Component Value Date    BUN 84.7 (H) 01/16/2024    CR 4.36 (H) 01/16/2024     Lab Results   Component Value Date    MAG 2.5 (H) 01/16/2024     Lab Results   Component Value Date    PHOS 5.3 (H) 01/15/2024       Creatinine   Date Value Ref Range Status   01/16/2024 4.36 (H) 0.67 - 1.17 mg/dL Final   01/15/2024 4.24 (H) 0.67 - 1.17 mg/dL Final   01/14/2024 3.61 (H) 0.67 - 1.17 mg/dL Final   01/14/2024 3.27 (H) 0.67 - 1.17 mg/dL Final   01/14/2024 3.06 (H) 0.67 - 1.17 mg/dL Final   01/13/2024 2.50 (H) 0.67 - 1.17 mg/dL Final       Attestation:  I have reviewed today's vital signs, notes, medications, labs and imaging.     Keenan Segovia MD

## 2024-01-16 NOTE — PROGRESS NOTES
General surgery:  Feels okay but still very bloated.  Passing gas and having stools.  Vitals: Afebrile, still a bit tachycardic    Physical exam:  Abdomen still quite distended.  Nontender.    Laboratories:  Creatinine 4.36, stable  Total bili 1.7, down  White blood count 18.4  Calcium 6.8    Impression: Gallstone pancreatitis.  No significant change today.  I think before we even think about any surgery for him we should repeat his CT scan just to evaluate for pancreatic fluid collection/necrosis.  He needs to make significant improvement before thinking about surgery.  Also need to more aggressively correct his calcium.

## 2024-01-16 NOTE — PROGRESS NOTES
[facility]  [facility]  Gastroenterology Progress note    Interval History:      Patient stable.  Passing gas and moving bowels.  Still distended but not worsening.      Vital Signs:      BP (!) 139/93   Pulse 101   Temp 98.2  F (36.8  C) (Oral)   Resp 19   Ht 1.829 m (6')   Wt 114.2 kg (251 lb 11.2 oz)   SpO2 96%   BMI 34.14 kg/m    Temp (24hrs), Av  F (36.7  C), Min:97.7  F (36.5  C), Max:98.2  F (36.8  C)    Patient Vitals for the past 72 hrs:   Weight   24 0500 114.2 kg (251 lb 11.2 oz)   24 1311 97.5 kg (215 lb)       Intake/Output Summary (Last 24 hours) at 2024 0942  Last data filed at 2024 0638  Gross per 24 hour   Intake 1715 ml   Output 1900 ml   Net -185 ml         Constitutional: NAD, comfortable  Cardiovascular: RRR, normal S1, S2   Respiratory: CTAB  Abdomen: soft, non-tender, distended    Additional Comments:  ROS, FH, SH: See initial GI consult for details.    Laboratory Data:  Recent Labs   Lab Test 24  0528 01/15/24  0548 24  1825 24  0546   WBC 18.4* 13.2*  --  13.0*   HGB 9.5* 9.9* 11.2* 11.8*   MCV 86 85  --  84    253  --  196     Recent Labs   Lab Test 24  0528 01/15/24  0548 24  2346 24  1825 24  1304 24  0546   * 125*  125* 126*   < > 124* 124*   POTASSIUM 3.9 4.3  --   --   --  4.6   CHLORIDE 94* 94*  --   --   --  92*   CO2 20* 16*  --   --   --  16*   BUN 84.7* 84.9*  --   --   --  75.9*   CR 4.36* 4.24*  --   --  3.61* 3.27*   ANIONGAP 13 15  --   --   --  16*   LUBNA 6.9* 6.5*  --   --   --  6.6*    < > = values in this interval not displayed.     Recent Labs   Lab Test 24  0528 01/15/24  0548 24  0546 24  1333   ALBUMIN 2.8* 2.8*  2.8* 2.7* 3.4*   BILITOTAL 1.7* 2.2* 3.7* 6.1*   DBIL  --  1.52*  --   --    ALT 61 71* 81* 112*   AST 72* 94* 106* 133*   ALKPHOS 70 73 79 106   LIPASE  --   --   --  776*         Assessment:  47 yo male with  moderate/severe gallstone pancreatitis with evidence of obstructing stones and hemorrhagic pancreatitis.  ERCP 1/14/24 with erosive gastropathy and duodenitis, biliary sphincterotomy with stone removal, pancreatic stent placed and CBD stent placed due to severe edema.  There was initially concern for possible compartment syndrome give the degree of abdominal distention.  Distention is slowly improving.  Liver function tests improving.  Total bilirubin 2.2 ->1.7.  AST 94 -> 72.  Plan:  -  NPO.  Advance diet as tolerated to low fat.  -  Pantoprazole BID.  -  Trend liver function tests.  -  Pain control per hospital team.  -  Agree with surgery re: CT to evaluate with fluid collections or necrosis.  Will defer to them of timing.  -  Abdominal x-ray in 10 days to evaluate for retained PD stent.  If still present, EGD to remove.  Our office will call to arrange.  -  ERCP in 4-6 weeks to remove biliary stent.  Our office will call to arrange.    Total Time Spent: 25 minutes    MARYCARMEN Meyer  [facility]  Cell:  [phone], not available after 11:30AM at this number  Dr. Cameron covering the afternoon.

## 2024-01-17 ENCOUNTER — APPOINTMENT (OUTPATIENT)
Dept: CT IMAGING | Facility: CLINIC | Age: 49
End: 2024-01-17
Attending: SPECIALIST
Payer: COMMERCIAL

## 2024-01-17 LAB
ALBUMIN SERPL BCG-MCNC: 2.8 G/DL (ref 3.5–5.2)
ALP SERPL-CCNC: 78 U/L (ref 40–150)
ALT SERPL W P-5'-P-CCNC: 50 U/L (ref 0–70)
ANION GAP SERPL CALCULATED.3IONS-SCNC: 13 MMOL/L (ref 7–15)
AST SERPL W P-5'-P-CCNC: 60 U/L (ref 0–45)
BASOPHILS # BLD AUTO: ABNORMAL 10*3/UL
BASOPHILS # BLD MANUAL: 0 10E3/UL (ref 0–0.2)
BASOPHILS NFR BLD AUTO: ABNORMAL %
BASOPHILS NFR BLD MANUAL: 0 %
BILIRUB SERPL-MCNC: 1.5 MG/DL
BUN SERPL-MCNC: 82 MG/DL (ref 6–20)
CA-I BLD-MCNC: 4 MG/DL (ref 4.4–5.2)
CALCIUM SERPL-MCNC: 7.3 MG/DL (ref 8.6–10)
CHLORIDE SERPL-SCNC: 93 MMOL/L (ref 98–107)
CREAT SERPL-MCNC: 4.3 MG/DL (ref 0.67–1.17)
DEPRECATED HCO3 PLAS-SCNC: 22 MMOL/L (ref 22–29)
EGFRCR SERPLBLD CKD-EPI 2021: 16 ML/MIN/1.73M2
EOSINOPHIL # BLD AUTO: ABNORMAL 10*3/UL
EOSINOPHIL # BLD MANUAL: 0.3 10E3/UL (ref 0–0.7)
EOSINOPHIL NFR BLD AUTO: ABNORMAL %
EOSINOPHIL NFR BLD MANUAL: 1 %
ERYTHROCYTE [DISTWIDTH] IN BLOOD BY AUTOMATED COUNT: 15.9 % (ref 10–15)
GLUCOSE BLDC GLUCOMTR-MCNC: 93 MG/DL (ref 70–99)
GLUCOSE SERPL-MCNC: 103 MG/DL (ref 70–99)
HCT VFR BLD AUTO: 26.7 % (ref 40–53)
HGB BLD-MCNC: 9.3 G/DL (ref 13.3–17.7)
IMM GRANULOCYTES # BLD: ABNORMAL 10*3/UL
IMM GRANULOCYTES NFR BLD: ABNORMAL %
LYMPHOCYTES # BLD AUTO: ABNORMAL 10*3/UL
LYMPHOCYTES # BLD MANUAL: 2.4 10E3/UL (ref 0.8–5.3)
LYMPHOCYTES NFR BLD AUTO: ABNORMAL %
LYMPHOCYTES NFR BLD MANUAL: 9 %
MAGNESIUM SERPL-MCNC: 2.6 MG/DL (ref 1.7–2.3)
MCH RBC QN AUTO: 29.5 PG (ref 26.5–33)
MCHC RBC AUTO-ENTMCNC: 34.8 G/DL (ref 31.5–36.5)
MCV RBC AUTO: 85 FL (ref 78–100)
METAMYELOCYTES # BLD MANUAL: 0.5 10E3/UL
METAMYELOCYTES NFR BLD MANUAL: 2 %
MONOCYTES # BLD AUTO: ABNORMAL 10*3/UL
MONOCYTES # BLD MANUAL: 2.4 10E3/UL (ref 0–1.3)
MONOCYTES NFR BLD AUTO: ABNORMAL %
MONOCYTES NFR BLD MANUAL: 9 %
MYELOCYTES # BLD MANUAL: 0.3 10E3/UL
MYELOCYTES NFR BLD MANUAL: 1 %
NEUTROPHILS # BLD AUTO: ABNORMAL 10*3/UL
NEUTROPHILS # BLD MANUAL: 21.1 10E3/UL (ref 1.6–8.3)
NEUTROPHILS NFR BLD AUTO: ABNORMAL %
NEUTROPHILS NFR BLD MANUAL: 78 %
NRBC # BLD AUTO: 0 10E3/UL
NRBC BLD AUTO-RTO: 0 /100
PLAT MORPH BLD: ABNORMAL
PLATELET # BLD AUTO: 326 10E3/UL (ref 150–450)
POTASSIUM SERPL-SCNC: 3.6 MMOL/L (ref 3.4–5.3)
PROT SERPL-MCNC: 5.5 G/DL (ref 6.4–8.3)
RBC # BLD AUTO: 3.15 10E6/UL (ref 4.4–5.9)
RBC MORPH BLD: ABNORMAL
SODIUM SERPL-SCNC: 128 MMOL/L (ref 135–145)
TARGETS BLD QL SMEAR: SLIGHT
WBC # BLD AUTO: 27 10E3/UL (ref 4–11)

## 2024-01-17 PROCEDURE — 36415 COLL VENOUS BLD VENIPUNCTURE: CPT | Performed by: INTERNAL MEDICINE

## 2024-01-17 PROCEDURE — 250N000013 HC RX MED GY IP 250 OP 250 PS 637: Performed by: INTERNAL MEDICINE

## 2024-01-17 PROCEDURE — 80053 COMPREHEN METABOLIC PANEL: CPT | Performed by: INTERNAL MEDICINE

## 2024-01-17 PROCEDURE — 99232 SBSQ HOSP IP/OBS MODERATE 35: CPT | Performed by: SPECIALIST

## 2024-01-17 PROCEDURE — 250N000009 HC RX 250: Performed by: INTERNAL MEDICINE

## 2024-01-17 PROCEDURE — 250N000011 HC RX IP 250 OP 636: Performed by: NURSE PRACTITIONER

## 2024-01-17 PROCEDURE — 99232 SBSQ HOSP IP/OBS MODERATE 35: CPT | Performed by: INTERNAL MEDICINE

## 2024-01-17 PROCEDURE — 258N000002 HC RX IP 258 OP 250: Performed by: INTERNAL MEDICINE

## 2024-01-17 PROCEDURE — C9113 INJ PANTOPRAZOLE SODIUM, VIA: HCPCS | Performed by: INTERNAL MEDICINE

## 2024-01-17 PROCEDURE — 74176 CT ABD & PELVIS W/O CONTRAST: CPT

## 2024-01-17 PROCEDURE — 250N000013 HC RX MED GY IP 250 OP 250 PS 637: Performed by: PAIN MEDICINE

## 2024-01-17 PROCEDURE — 82330 ASSAY OF CALCIUM: CPT | Performed by: INTERNAL MEDICINE

## 2024-01-17 PROCEDURE — 250N000011 HC RX IP 250 OP 636: Performed by: INTERNAL MEDICINE

## 2024-01-17 PROCEDURE — 83735 ASSAY OF MAGNESIUM: CPT | Performed by: INTERNAL MEDICINE

## 2024-01-17 PROCEDURE — 85027 COMPLETE CBC AUTOMATED: CPT | Performed by: INTERNAL MEDICINE

## 2024-01-17 PROCEDURE — 85007 BL SMEAR W/DIFF WBC COUNT: CPT | Performed by: INTERNAL MEDICINE

## 2024-01-17 PROCEDURE — 250N000013 HC RX MED GY IP 250 OP 250 PS 637: Performed by: NURSE PRACTITIONER

## 2024-01-17 PROCEDURE — 120N000013 HC R&B IMCU

## 2024-01-17 PROCEDURE — 250N000011 HC RX IP 250 OP 636

## 2024-01-17 PROCEDURE — 99233 SBSQ HOSP IP/OBS HIGH 50: CPT | Performed by: INTERNAL MEDICINE

## 2024-01-17 RX ORDER — POTASSIUM CHLORIDE 1500 MG/1
20 TABLET, EXTENDED RELEASE ORAL 2 TIMES DAILY
Status: COMPLETED | OUTPATIENT
Start: 2024-01-17 | End: 2024-01-17

## 2024-01-17 RX ORDER — METOPROLOL TARTRATE 25 MG/1
25 TABLET, FILM COATED ORAL 2 TIMES DAILY
Status: DISCONTINUED | OUTPATIENT
Start: 2024-01-17 | End: 2024-01-26 | Stop reason: HOSPADM

## 2024-01-17 RX ADMIN — PIPERACILLIN AND TAZOBACTAM 2.25 G: 2; .25 INJECTION, POWDER, FOR SOLUTION INTRAVENOUS at 02:30

## 2024-01-17 RX ADMIN — HYDROMORPHONE HYDROCHLORIDE 4 MG: 2 TABLET ORAL at 16:44

## 2024-01-17 RX ADMIN — LIDOCAINE: 50 OINTMENT TOPICAL at 20:30

## 2024-01-17 RX ADMIN — POTASSIUM CHLORIDE 20 MEQ: 1500 TABLET, EXTENDED RELEASE ORAL at 20:26

## 2024-01-17 RX ADMIN — SODIUM BICARBONATE: 84 INJECTION, SOLUTION INTRAVENOUS at 16:42

## 2024-01-17 RX ADMIN — METHOCARBAMOL 500 MG: 500 TABLET ORAL at 20:26

## 2024-01-17 RX ADMIN — METHOCARBAMOL 500 MG: 500 TABLET ORAL at 06:17

## 2024-01-17 RX ADMIN — LIDOCAINE: 50 OINTMENT TOPICAL at 09:04

## 2024-01-17 RX ADMIN — PANTOPRAZOLE SODIUM 40 MG: 40 INJECTION, POWDER, FOR SOLUTION INTRAVENOUS at 08:53

## 2024-01-17 RX ADMIN — METOPROLOL TARTRATE 25 MG: 25 TABLET, FILM COATED ORAL at 12:01

## 2024-01-17 RX ADMIN — METOPROLOL TARTRATE 25 MG: 25 TABLET, FILM COATED ORAL at 20:26

## 2024-01-17 RX ADMIN — HYDROMORPHONE HYDROCHLORIDE 0.5 MG: 1 INJECTION, SOLUTION INTRAMUSCULAR; INTRAVENOUS; SUBCUTANEOUS at 03:59

## 2024-01-17 RX ADMIN — HYDROMORPHONE HYDROCHLORIDE 4 MG: 2 TABLET ORAL at 13:37

## 2024-01-17 RX ADMIN — HYDROMORPHONE HYDROCHLORIDE 4 MG: 2 TABLET ORAL at 02:21

## 2024-01-17 RX ADMIN — HYDROMORPHONE HYDROCHLORIDE 4 MG: 2 TABLET ORAL at 20:17

## 2024-01-17 RX ADMIN — PANTOPRAZOLE SODIUM 40 MG: 40 INJECTION, POWDER, FOR SOLUTION INTRAVENOUS at 20:24

## 2024-01-17 RX ADMIN — PIPERACILLIN AND TAZOBACTAM 2.25 G: 2; .25 INJECTION, POWDER, FOR SOLUTION INTRAVENOUS at 08:53

## 2024-01-17 RX ADMIN — HYDROMORPHONE HYDROCHLORIDE 4 MG: 2 TABLET ORAL at 06:17

## 2024-01-17 RX ADMIN — POTASSIUM CHLORIDE 20 MEQ: 1500 TABLET, EXTENDED RELEASE ORAL at 12:01

## 2024-01-17 RX ADMIN — LIDOCAINE: 50 OINTMENT TOPICAL at 16:19

## 2024-01-17 ASSESSMENT — ACTIVITIES OF DAILY LIVING (ADL)
ADLS_ACUITY_SCORE: 24
ADLS_ACUITY_SCORE: 20
ADLS_ACUITY_SCORE: 24
ADLS_ACUITY_SCORE: 20
ADLS_ACUITY_SCORE: 20
ADLS_ACUITY_SCORE: 22

## 2024-01-17 NOTE — PROGRESS NOTES
[facility] Progress Note     IMPRESSION:  49 yo male with severe gallstone pancreatitis s/p ERCP 24 with erosive gastropathy and duodenitis, biliary sphincterotomy with stone removal, PD and CBD stent placed.  Initial imaging concerning for hemorrhagic complications and thus there was concern for possible compartment syndrome give the degree of abdominal distention, however today's exam is encouraging.  Ongoing leukocytosis with BC neg - highest risk of infection, ie infected necrosis ~ 1-2 wks after admit.    RECOMMENDATIONS:  -No GI indication for zosyn - favor stopping this  -Culture if spikes  -Continue to ADAT, follow stool output, transition to po narcs    Total time spent in chart review, direct medical discussion, examination, and documentation was 20 minutes    Broderick Cameron DO   [facility]  Office [phone]    ________________________________________________________________________      SUBJECTIVE:  Feeling well today, tolerating liquids.  Brown stool yesterday.  Mostly po narcotics.      OBJECTIVE:  /80   Pulse 93   Temp 98  F (36.7  C) (Oral)   Resp 10   Ht 1.829 m (6')   Wt 114.2 kg (251 lb 11.2 oz)   SpO2 94%   BMI 34.14 kg/m    Temp (24hrs), Av.3  F (36.8  C), Min:98  F (36.7  C), Max:99.1  F (37.3  C)    Patient Vitals for the past 72 hrs:   Weight   24 0500 114.2 kg (251 lb 11.2 oz)       Intake/Output Summary (Last 24 hours) at 2024 0914  Last data filed at 2024 0911  Gross per 24 hour   Intake --   Output 1650 ml   Net -1650 ml        PHYSICAL EXAM  GEN: Alert, oriented x3, communicative and in NAD.    ABD: Moderate distension, +BS, firm, but nontender.    Additional Data:  I have reviewed the patient's new clinical lab results:     Recent Labs   Lab Test 24  0525 24  0528 01/15/24  0548   WBC 27.0* 18.4* 13.2*   HGB 9.3* 9.5* 9.9*   MCV 85 86 85    303 253     Recent Labs   Lab Test 24  0525 24  0528  01/15/24  0548   POTASSIUM 3.6 3.9 4.3   CHLORIDE 93* 94* 94*   CO2 22 20* 16*   BUN 82.0* 84.7* 84.9*   ANIONGAP 13 13 15     Recent Labs   Lab Test 01/17/24  0525 01/16/24  0528 01/15/24  0548 01/14/24  0546 01/13/24  1333   ALBUMIN 2.8* 2.8* 2.8*  2.8*   < > 3.4*   BILITOTAL 1.5* 1.7* 2.2*   < > 6.1*   ALT 50 61 71*   < > 112*   AST 60* 72* 94*   < > 133*   LIPASE  --   --   --   --  776*    < > = values in this interval not displayed.

## 2024-01-17 NOTE — PLAN OF CARE
Goal Outcome Evaluation:  Orientations: A&Ox4  Vitals/Pain: VSS on RA. Pain controlled with dilaudid.   Tele: ST  Lines/Drains: 2 PIV  Skin/Wounds: Intact  GI/: NPO ex; meds and ice chips. Adequate UOP via [place]. Small loose BM.   Labs: Abnormal/Trends, Electrolyte Replacement- Cr 4.36, Na 127, WBC 18.4, Hgb 9.5  Ambulation/Assist: SBA/ IND  Sleep Quality: Fair  Plan: Pain control with PO/ IV dilaudid. Needs repeat CT scan. Surgery plan pending improvement.

## 2024-01-17 NOTE — PROGRESS NOTES
General surgery:  Hungry.  States that the pain overall is better.  Low-grade temp to 99.1 last night otherwise afebrile.  Vitals are stable.    Physical exam:  Sitting up in a chair when I saw him.  Looks comfortable.  Abdomen still distended.    Laboratories:  White blood count 27 K  Hemoglobin 9.3  Creatinine 4.3    Impression: Severe hemorrhagic pancreatitis.  I obtained a CT scan because of the elevated white blood count.  It does not show any convincing evidence of necrosis.  Not a perfect study because I did not use IV contrast but it is largely unchanged from his previous CT scan.  At this point there is simply too much inflammation to attempt laparoscopic cholecystectomy and that can be delayed indefinitely until this inflammation goes down.  The white count is likely just a reaction to the pancreas and not a sign of infection.  Plan: We will let him start clear liquids and then can slowly advance that if he is tolerating it.  But I would go very slow.  No plans for laparoscopic cholecystectomy anytime soon.

## 2024-01-17 NOTE — PROGRESS NOTES
[facility]    Medicine Progress Note - Hospitalist Service    Date of Admission:  1/13/2024    Assessment & Plan   Pt is 28-year-old male with no significant past medical history presents with 2-day history of abdominal bloating,     Acute hemorrhagic biliary pancreatitis.  Cholelithiasis w/o cholecystitis.  Choledocholithiasis with 3 cm CBD dilatation.  Elevated LFTs.  Worsening leukocytosis  CT abdomen -extensive signs of acute pancreatitis, no choledocholithiasis noted but Cholelithiasis noted without cholecystitis.  Ultrasound of the abdomen was also obtained which did not indicate any choledocholithiasis.  Initial lipase 776.  White count 15.1 at presentation.  Bilirubin 6.1  [facility] following, appreciate input  -MRCP-with 2 choledocholithiasis with distal common bile duct measuring up to 3 mm with mild upstream biliary ductal dilatation.  Acute hemorrhagic pancreatitis with extensive peripancreatic inflammation and fluid as described, cholelithiasis without evidence of acute cholecystitis.  Reactive edema within the proximal duodenum related to pancreatitis  -ERCP-1/14 choledocholithiasis was found with complete removal with biliary sphincterotomy and biliary stent placed given severe edema.  Pancreatic stent placed.  Severe erosive gastropathy and duodenopathy was also found  -LFTs/total bilirubin improving and is 2.2 today  -IV PPI every 12 hours  -Given worsening leukocytosis, general surgery ordered CT abdomen pelvis without contrast.  No convincing evidence of necrosis but limited study because of no availability of contrast.  -Per GI infected pancreatic necrosis will usually occur 1 to 2 weeks after admit and since cultures so far negative and patient has been afebrile, GI recommended stopping Zosyn, will stop Zosyn.  -In abdomen pelvis CT done 1/17 the pancreatic duct has migrated now within the ascending colon.  Will defer to GI for further follow-up.  Also there is  cholelithiasis with probable stone in the cystic duct.  Will defer to GI for any further management of cystic duct stone  -Surgery following, appreciate input, given too much inflammation, holding off on laparoscopic cholecystectomy.  Defer timing to surgery  -Continue ambulation so as to get bowels moving  -Started on clear liquids, plan to advance as tolerated  -Appreciate pain management input  -Continue lidocaine patch, pain management adjusting his pain regimen.  -Patient had positive urine drug screen for fentanyl benzodiazepine and opiates on 1/14/2024 however patient had received Versed fentanyl and Dilaudid prior to that     Acute kidney injury  Hyponatremia  Nonanion gap metabolic acidosis, likely secondary to BRENDA    creatinine of 2.5 and sodium of 122 at presentation  -  Renal function gradually worsened up to 4.36, today stable at 4.3.  Sodium 128.  Bicarb improved to 20 on bicarb containing fluids  -Patient making adequate urine  -nephrology following, appreciate input  -Plan is to reduce IVF rate and possibly remove Cornejo tomorrow  -Avoid nephrotoxins    Hypocalcemia  -Calcium this morning is 7.3, ionized calcium 4, slightly lower than normal  -Will defer to nephrology if it needs replacement.    Anemia  Unknown baseline  Hemoglobin was normal at presentation, gradually dropping likely secondary to hydration, but has stabilized around 9  Patient denies any active bleed, monitor for any further drop  -During ERCP patient was noted to have severe erosive gastropathy and duodenopathy as above  -Patient has been started on Protonix IV twice daily, will continue        Diet: Clear Liquid Diet  Snacks/Supplements Adult: Ensure Clear; With Meals    DVT Prophylaxis: Pneumatic Compression Devices  Cornejo Catheter: PRESENT, indication: Strict 1-2 Hour I&O  Lines: None     Cardiac Monitoring: None  Code Status: Full Code      Clinically Significant Risk Factors         # Hyponatremia: Lowest Na = 127 mmol/L in  last 2 days, will monitor as appropriate  # Hypocalcemia: Lowest iCa = 4 mg/dL in last 2 days, will monitor and replace as appropriate     # Hypoalbuminemia: Lowest albumin = 2.7 g/dL at 1/14/2024  5:46 AM, will monitor as appropriate      # Acute Kidney Injury, unspecified: based on a >150% or 0.3 mg/dL increase in last creatinine compared to past 90 day average, will monitor renal function         # Obesity: Estimated body mass index is 34.14 kg/m  as calculated from the following:    Height as of this encounter: 1.829 m (6').    Weight as of this encounter: 114.2 kg (251 lb 11.2 oz)., PRESENT ON ADMISSION            Disposition Plan      Expected Discharge Date: 01/18/2024      Destination: home with family              Rylee Colón MD  Hospitalist Service  [facility]  Securely message with [employer] (more info)  Text page via [employer] Paging/Directory   ______________________________________________________________________    Interval History   Clinical he feels better as he has been started on liquid diet today and was able to tolerate it earlier today.  Has been sleeping okay for the last 2 nights.  Pain is currently under control.  No other nursing concerns.    Physical Exam   Vital Signs: Temp: 98  F (36.7  C) Temp src: Oral BP: 130/80 Pulse: 93   Resp: 10 SpO2: 94 % O2 Device: None (Room air) Oxygen Delivery: 2 LPM  Weight: 251 lbs 11.2 oz    Exam:  Constitutional: Awake, alert and no distress. Appears comfortable  Head: Normocephalic. No masses, lesions, tenderness or abnormalities  ENMT: ENT exam normal, no neck nodes or sinus tenderness  Cardiovascular: RRR.  no murmurs, no rubs or JVD  Respiratory:normal WOB,b/l equal air entry, no wheezes or crackles   Gastrointestinal: Abdomen distended, generalized tenderness, no peritoneal signs. BS hypoactive. No masses, organomegaly  : Deferred  Extremities :no edema , no clubbing or cyanosis      Medical Decision Making       46 MINUTES  SPENT BY ME on the date of service doing chart review, history, exam, documentation & further activities per the note.      Data     I have personally reviewed the following data over the past 24 hrs:    27.0 (H)  \   9.3 (L)   / 326     128 (L) 93 (L) 82.0 (H) /  103 (H)   3.6 22 4.30 (H) \     ALT: 50 AST: 60 (H) AP: 78 TBILI: 1.5 (H)   ALB: 2.8 (L) TOT PROTEIN: 5.5 (L) LIPASE: N/A       Imaging results reviewed over the past 24 hrs:   Recent Results (from the past 24 hour(s))   CT Abdomen Pelvis w/o Contrast    Narrative    CT ABDOMEN/PELVIS WITHOUT CONTRAST January 17, 2024 7:52 AM    CLINICAL HISTORY: Followup pancreatitis. Evaluate for pancreatic  necrosis.     TECHNIQUE: CT scan of the abdomen and pelvis was performed without IV  contrast. Multiplanar reformats were obtained. Dose reduction  techniques were used.  CONTRAST: None.    COMPARISON: MR abdomen 1/13/2024.    FINDINGS:   Study limitation: Noncontrast technique.    LOWER CHEST: Trace left pleural effusion with adjacent atelectasis.    HEPATOBILIARY: Cholelithiasis, including probable small stone in the  cystic duct (4/75). Vicariously excreted contrast in the gallbladder.  Trace pneumobilia. Significant biliary ductal dilatation. No  suspicious liver lesion.     Common bile duct stent extends into the proximal duodenum.    PANCREAS: Nondiagnostic evaluation for pancreatic necrosis. Since  1/13/2024, overall similar extensive acute hemorrhagic pancreatitis  with similar ill-defined fluid extending into the left upper quadrant,  along the left anterior pararenal space, and within the left paracolic  gutter. No thick rim surrounding pockets of fluid (for example along  the greater curvature measuring 7.3 x 6.9 cm) or internal gas to  suggest superinfection.     SPLEEN: Unremarkable.    ADRENAL GLANDS: No significant nodules.    KIDNEYS: No collecting system dilatation. Urinary bladder is  decompressed by Cornejo catheter.    BOWEL: No obstruction. Few  prominent right abdominal small bowel loops  may reflect mild ileus. Reactive wall thickening of the distal  transverse colon and proximal duodenum. Pancreatic stent is present  within the ascending colon (4/98).    VASCULATURE: Nonaneurysmal abdominal aorta.    LYMPH NODE AND PERITONEUM: Several prominent peripancreatic lymph  nodes are most likely reactive.    MUSCULOSKELETAL: No aggressive osseous lesion.    OTHER: None.      Impression    IMPRESSION:   1.  Nondiagnostic evaluation for pancreatic necrosis given noncontrast  technique.  2.  Since 1/13/2024, overall similar extensive acute hemorrhagic  pancreatitis with surrounding ill-defined fluid and pockets of fluid  along the left upper quadrant/stomach greater curvature. No internal  gas to suggest superinfection.  3.  Cholelithiasis with probable stone in the cystic duct. Common bile  duct stent without significant biliary ductal dilatation.  4.  Pancreatic duct stent has migrated, now within the ascending  colon.  5.  Trace left pleural effusion with adjacent atelectasis.     Recent Labs   Lab 01/17/24  0525 01/16/24  2255 01/16/24  0528 01/15/24  0548 01/13/24  1735 01/13/24  1333   WBC 27.0*  --  18.4* 13.2*   < > 15.9*   HGB 9.3*  --  9.5* 9.9*   < > 14.3   MCV 85  --  86 85   < > 84     --  303 253   < > 227   *  --  127* 125*  125*   < > 122*   POTASSIUM 3.6  --  3.9 4.3   < > 4.4   CHLORIDE 93*  --  94* 94*   < > 87*   CO2 22  --  20* 16*   < > 22   BUN 82.0*  --  84.7* 84.9*   < > 68.4*   CR 4.30*  --  4.36* 4.24*   < > 2.50*   ANIONGAP 13  --  13 15   < > 13   LUBNA 7.3*  --  6.9* 6.5*   < > 7.2*   * 93 94 114*   < > 141*   ALBUMIN 2.8*  --  2.8* 2.8*  2.8*   < > 3.4*   PROTTOTAL 5.5*  --  5.7* 5.7*   < > 6.6   BILITOTAL 1.5*  --  1.7* 2.2*   < > 6.1*   ALKPHOS 78  --  70 73   < > 106   ALT 50  --  61 71*   < > 112*   AST 60*  --  72* 94*   < > 133*   LIPASE  --   --   --   --   --  776*    < > = values in this interval not  displayed.

## 2024-01-17 NOTE — PROGRESS NOTES
Assessment and Plan:     BRENDA: Cr plateau to improved. Na is coming, up as is HCO3.    Labs today: Na 128, K 3.6, HCO3 16 > 20 > 22. Cr 4.24 > 4.36 > 4.30.   UO yest 1400 ml.     Reduce IVF rate. Replace K. ? Remove hollingsworth tomorrow.   Follow labs.             Interval History:   Hypertension: start beta blocker.     Gallstone pancreatitis. GI following. s/p ERCP 1/14/24 with erosive gastropathy and duodenitis, biliary sphincterotomy with stone removal, PD and CBD stent placed.                  Review of Systems:   Starting to take po. Good bowel function. Ambulating in room. Still has hollingsworth.           Medications:      lidocaine   Topical TID    nicotine  1 patch Transdermal Daily    nicotine   Transdermal Q8H    pantoprazole  40 mg Intravenous BID    piperacillin-tazobactam  2.25 g Intravenous Q6H    potassium chloride  20 mEq Oral BID    sodium chloride (PF)  3 mL Intracatheter Q8H    sodium chloride (PF)  3 mL Intracatheter Q8H      NaCl 0.45 % 1,000 mL with sodium bicarbonate 100 mEq/L infusion 100 mL/hr at 01/16/24 1207     Current active medications and PTA medications reviewed, see medication list for details.            Physical Exam:   Vitals were reviewed  Patient Vitals for the past 24 hrs:   BP Temp Temp src Pulse Resp SpO2   01/17/24 0600 130/80 -- -- 93 10 94 %   01/17/24 0359 (!) 139/91 -- -- 94 10 99 %   01/17/24 0200 (!) 158/96 98  F (36.7  C) Oral 93 10 97 %   01/17/24 0000 (!) 141/97 99.1  F (37.3  C) Oral 101 13 91 %   01/16/24 2205 127/70 -- -- 104 17 93 %   01/16/24 1600 (!) 154/93 -- -- 107 17 94 %   01/16/24 1554 -- 98.1  F (36.7  C) Oral -- -- --   01/16/24 1411 -- -- -- -- -- 98 %   01/16/24 1408 -- -- -- -- -- (!) 86 %   01/16/24 1400 (!) 137/91 -- -- 103 10 (!) 88 %   01/16/24 1208 -- 98.1  F (36.7  C) Oral -- -- --   01/16/24 1200 -- -- -- 104 19 --       Temp:  [98  F (36.7  C)-99.1  F (37.3  C)] 98  F (36.7  C)  Pulse:  [] 93  Resp:  [10-19] 10  BP: (127-158)/(70-97)  130/80  SpO2:  [86 %-99 %] 94 %    Temperatures:  Current - Temp: 98  F (36.7  C); Max - Temp  Av.3  F (36.8  C)  Min: 98  F (36.7  C)  Max: 99.1  F (37.3  C)  Respiration range: Resp  Av.3  Min: 10  Max: 19  Pulse range: Pulse  Av.9  Min: 93  Max: 107  Blood pressure range: Systolic (24hrs), Av , Min:127 , Max:158   ; Diastolic (24hrs), Av, Min:70, Max:97    Pulse oximetry range: SpO2  Av.3 %  Min: 86 %  Max: 99 %    I/O last 3 completed shifts:  In: -   Out: 1400 [Urine:1400]      Intake/Output Summary (Last 24 hours) at 2024 1007  Last data filed at 2024 0953  Gross per 24 hour   Intake 240 ml   Output 1650 ml   Net -1410 ml     Alert and responsive  Lungs with clear BS  Cor RRR nl S1 S2 no M  LE no edema  Cornejo         Wt Readings from Last 4 Encounters:   24 114.2 kg (251 lb 11.2 oz)          Data:          Lab Results   Component Value Date     2024     2024     01/15/2024     01/15/2024    Lab Results   Component Value Date    CHLORIDE 93 2024    CHLORIDE 94 2024    CHLORIDE 94 01/15/2024    Lab Results   Component Value Date    BUN 82.0 2024    BUN 84.7 2024    BUN 84.9 01/15/2024      Lab Results   Component Value Date    POTASSIUM 3.6 2024    POTASSIUM 3.9 2024    POTASSIUM 4.3 01/15/2024    Lab Results   Component Value Date    CO2 22 2024    CO2 20 2024    CO2 16 01/15/2024    Lab Results   Component Value Date    CR 4.30 2024    CR 4.36 2024    CR 4.24 01/15/2024        Recent Labs   Lab Test 24  0525 24  0528 01/15/24  0548   WBC 27.0* 18.4* 13.2*   HGB 9.3* 9.5* 9.9*   HCT 26.7* 27.8* 28.5*   MCV 85 86 85    303 253     Recent Labs   Lab Test 24  0525 24  0528 01/15/24  0548 24  0546 24  1333   AST 60* 72* 94*   < > 133*   ALT 50 61 71*   < > 112*   ALKPHOS 78 70 73   < > 106   BILITOTAL 1.5* 1.7* 2.2*   < > 6.1*   RIAN   --   --   --   --  14*    < > = values in this interval not displayed.       Recent Labs   Lab Test 01/17/24  0525 01/16/24  0528 01/15/24  0548   MAG 2.6* 2.5* 2.5*     Recent Labs   Lab Test 01/15/24  0548   PHOS 5.3*     Recent Labs   Lab Test 01/17/24  0525 01/16/24  0528 01/15/24  0548   LUBNA 7.3* 6.9* 6.5*       Lab Results   Component Value Date    LUBNA 7.3 (L) 01/17/2024     Lab Results   Component Value Date    WBC 27.0 (H) 01/17/2024    HGB 9.3 (L) 01/17/2024    HCT 26.7 (L) 01/17/2024    MCV 85 01/17/2024     01/17/2024     Lab Results   Component Value Date     (L) 01/17/2024    POTASSIUM 3.6 01/17/2024    CHLORIDE 93 (L) 01/17/2024    CO2 22 01/17/2024     (H) 01/17/2024     Lab Results   Component Value Date    BUN 82.0 (H) 01/17/2024    CR 4.30 (H) 01/17/2024     Lab Results   Component Value Date    MAG 2.6 (H) 01/17/2024     Lab Results   Component Value Date    PHOS 5.3 (H) 01/15/2024       Creatinine   Date Value Ref Range Status   01/17/2024 4.30 (H) 0.67 - 1.17 mg/dL Final   01/16/2024 4.36 (H) 0.67 - 1.17 mg/dL Final   01/15/2024 4.24 (H) 0.67 - 1.17 mg/dL Final   01/14/2024 3.61 (H) 0.67 - 1.17 mg/dL Final   01/14/2024 3.27 (H) 0.67 - 1.17 mg/dL Final   01/14/2024 3.06 (H) 0.67 - 1.17 mg/dL Final       Attestation:  I have reviewed today's vital signs, notes, medications, labs and imaging.     Keenan Segovia MD

## 2024-01-17 NOTE — PLAN OF CARE
Orientations: A&O x4   Vitals/Pain: VSS on RA. 5/10 abdominal and back pain managed with IV/Oral dilaudid and robaxin, lidocaine cream  Tele: SR/ST.   Lines/Drains: Cornejo cath, Right and left PIVs, right infusing .45 NS w/sodium bicarb at 100 ml/hr.  Skin/Wounds: WNL  GI/: Distended abdomen, firm and tender. Bowel sounds normoactive. +flatus. Loose watery stools. Up to bathroom frequently. Adequate UOP.   Diet: NPO, ice chips  Labs: Na+: 127, potassium: 3.9, Creat: 4.36. Potassium and magnesium protocol   Ambulation/Assist: Independent in room, ambulating in halls frequently.   Sleep Quality: good  Plan: Surgery will follow up again tomorrow. Continue Abx and pain management.

## 2024-01-17 NOTE — PLAN OF CARE
Vital signs stable on 2L nasal cannula overnight for sleep apnea. Alert and oriented. Lung sounds clear. Bowel sounds active with moderate distention still, tender but soft. 2 moderate BM's with flatus. Pain managed with dilaudid and robaxin. Up stand by in room. Tolerating ice chips with no nausea. CMS/neuros intact. Adequate hollingsworth output. Tele normal sinus.

## 2024-01-17 NOTE — PLAN OF CARE
ORIENTATION/NEURO: A/o x 4  VITALS/TELE: VSS, Tele NSR  PAIN: Back pain managed with PRN PO dilaudid and topical lidocaine cream  RESP: Room air, LS CTA  DIET: Advanced to clear liquids, pt tolerating well  GI/: Abd round, distended, and tender. Denies nausea. Few loose BM's. Adequate UO  LINES/DRAINS: R PIV infusing 1/2 NS w/ bicarb @ 50 ml/hr, L PIV SL  LABS: WBC elevated 27.0, K and Mg WNL, rechecks in AM  SKIN: WNL  ASSIST/AMBULATION: SBA w/ gait belt  FALL RISK: Yes  PLAN/DISPOSITION: Assess tolerance to clear liquids. Pain management.         Goal Outcome Evaluation:      Plan of Care Reviewed With: patient    Overall Patient Progress: improving

## 2024-01-18 LAB
ALBUMIN SERPL BCG-MCNC: 2.6 G/DL (ref 3.5–5.2)
ALP SERPL-CCNC: 90 U/L (ref 40–150)
ALT SERPL W P-5'-P-CCNC: 43 U/L (ref 0–70)
ANION GAP SERPL CALCULATED.3IONS-SCNC: 14 MMOL/L (ref 7–15)
AST SERPL W P-5'-P-CCNC: 51 U/L (ref 0–45)
BACTERIA BLD CULT: NO GROWTH
BACTERIA BLD CULT: NO GROWTH
BASOPHILS # BLD AUTO: ABNORMAL 10*3/UL
BASOPHILS # BLD MANUAL: 0 10E3/UL (ref 0–0.2)
BASOPHILS NFR BLD AUTO: ABNORMAL %
BASOPHILS NFR BLD MANUAL: 0 %
BILIRUB SERPL-MCNC: 1.2 MG/DL
BUN SERPL-MCNC: 70.4 MG/DL (ref 6–20)
CALCIUM SERPL-MCNC: 7.5 MG/DL (ref 8.6–10)
CHLORIDE SERPL-SCNC: 93 MMOL/L (ref 98–107)
CREAT SERPL-MCNC: 4.01 MG/DL (ref 0.67–1.17)
DEPRECATED HCO3 PLAS-SCNC: 23 MMOL/L (ref 22–29)
EGFRCR SERPLBLD CKD-EPI 2021: 18 ML/MIN/1.73M2
EOSINOPHIL # BLD AUTO: ABNORMAL 10*3/UL
EOSINOPHIL # BLD MANUAL: 0.6 10E3/UL (ref 0–0.7)
EOSINOPHIL NFR BLD AUTO: ABNORMAL %
EOSINOPHIL NFR BLD MANUAL: 2 %
ERYTHROCYTE [DISTWIDTH] IN BLOOD BY AUTOMATED COUNT: 15.3 % (ref 10–15)
GLUCOSE SERPL-MCNC: 142 MG/DL (ref 70–99)
HCT VFR BLD AUTO: 26.7 % (ref 40–53)
HGB BLD-MCNC: 9.3 G/DL (ref 13.3–17.7)
IMM GRANULOCYTES # BLD: ABNORMAL 10*3/UL
IMM GRANULOCYTES NFR BLD: ABNORMAL %
LACTATE SERPL-SCNC: 1.8 MMOL/L (ref 0.7–2)
LYMPHOCYTES # BLD AUTO: ABNORMAL 10*3/UL
LYMPHOCYTES # BLD MANUAL: 0.6 10E3/UL (ref 0.8–5.3)
LYMPHOCYTES NFR BLD AUTO: ABNORMAL %
LYMPHOCYTES NFR BLD MANUAL: 2 %
MAGNESIUM SERPL-MCNC: 2.5 MG/DL (ref 1.7–2.3)
MCH RBC QN AUTO: 29.6 PG (ref 26.5–33)
MCHC RBC AUTO-ENTMCNC: 34.8 G/DL (ref 31.5–36.5)
MCV RBC AUTO: 85 FL (ref 78–100)
METAMYELOCYTES # BLD MANUAL: 1.7 10E3/UL
METAMYELOCYTES NFR BLD MANUAL: 6 %
MONOCYTES # BLD AUTO: ABNORMAL 10*3/UL
MONOCYTES # BLD MANUAL: 2.3 10E3/UL (ref 0–1.3)
MONOCYTES NFR BLD AUTO: ABNORMAL %
MONOCYTES NFR BLD MANUAL: 8 %
NEUTROPHILS # BLD AUTO: ABNORMAL 10*3/UL
NEUTROPHILS # BLD MANUAL: 23.9 10E3/UL (ref 1.6–8.3)
NEUTROPHILS NFR BLD AUTO: ABNORMAL %
NEUTROPHILS NFR BLD MANUAL: 82 %
NRBC # BLD AUTO: 0 10E3/UL
NRBC BLD AUTO-RTO: 0 /100
PLAT MORPH BLD: ABNORMAL
PLATELET # BLD AUTO: 388 10E3/UL (ref 150–450)
POTASSIUM SERPL-SCNC: 3.3 MMOL/L (ref 3.4–5.3)
POTASSIUM SERPL-SCNC: 3.5 MMOL/L (ref 3.4–5.3)
PROT SERPL-MCNC: 5.4 G/DL (ref 6.4–8.3)
RBC # BLD AUTO: 3.14 10E6/UL (ref 4.4–5.9)
RBC MORPH BLD: ABNORMAL
SODIUM SERPL-SCNC: 130 MMOL/L (ref 135–145)
TARGETS BLD QL SMEAR: SLIGHT
WBC # BLD AUTO: 29.1 10E3/UL (ref 4–11)

## 2024-01-18 PROCEDURE — 250N000011 HC RX IP 250 OP 636: Performed by: INTERNAL MEDICINE

## 2024-01-18 PROCEDURE — 250N000013 HC RX MED GY IP 250 OP 250 PS 637: Performed by: INTERNAL MEDICINE

## 2024-01-18 PROCEDURE — 99207 PR APP CREDIT; MD BILLING SHARED VISIT: CPT | Performed by: NURSE PRACTITIONER

## 2024-01-18 PROCEDURE — 120N000013 HC R&B IMCU

## 2024-01-18 PROCEDURE — 99233 SBSQ HOSP IP/OBS HIGH 50: CPT | Performed by: STUDENT IN AN ORGANIZED HEALTH CARE EDUCATION/TRAINING PROGRAM

## 2024-01-18 PROCEDURE — 83605 ASSAY OF LACTIC ACID: CPT | Performed by: STUDENT IN AN ORGANIZED HEALTH CARE EDUCATION/TRAINING PROGRAM

## 2024-01-18 PROCEDURE — 99232 SBSQ HOSP IP/OBS MODERATE 35: CPT | Performed by: INTERNAL MEDICINE

## 2024-01-18 PROCEDURE — 250N000013 HC RX MED GY IP 250 OP 250 PS 637: Performed by: HOSPITALIST

## 2024-01-18 PROCEDURE — 99418 PROLNG IP/OBS E/M EA 15 MIN: CPT | Performed by: STUDENT IN AN ORGANIZED HEALTH CARE EDUCATION/TRAINING PROGRAM

## 2024-01-18 PROCEDURE — 85027 COMPLETE CBC AUTOMATED: CPT | Performed by: INTERNAL MEDICINE

## 2024-01-18 PROCEDURE — C9113 INJ PANTOPRAZOLE SODIUM, VIA: HCPCS | Performed by: INTERNAL MEDICINE

## 2024-01-18 PROCEDURE — 99231 SBSQ HOSP IP/OBS SF/LOW 25: CPT | Performed by: SPECIALIST

## 2024-01-18 PROCEDURE — 36415 COLL VENOUS BLD VENIPUNCTURE: CPT | Performed by: INTERNAL MEDICINE

## 2024-01-18 PROCEDURE — 83735 ASSAY OF MAGNESIUM: CPT | Performed by: INTERNAL MEDICINE

## 2024-01-18 PROCEDURE — 85007 BL SMEAR W/DIFF WBC COUNT: CPT | Performed by: INTERNAL MEDICINE

## 2024-01-18 PROCEDURE — 250N000013 HC RX MED GY IP 250 OP 250 PS 637: Performed by: NURSE PRACTITIONER

## 2024-01-18 PROCEDURE — 84132 ASSAY OF SERUM POTASSIUM: CPT | Performed by: INTERNAL MEDICINE

## 2024-01-18 PROCEDURE — 250N000013 HC RX MED GY IP 250 OP 250 PS 637: Performed by: PAIN MEDICINE

## 2024-01-18 PROCEDURE — 80053 COMPREHEN METABOLIC PANEL: CPT | Performed by: INTERNAL MEDICINE

## 2024-01-18 RX ORDER — POTASSIUM CHLORIDE 1500 MG/1
20 TABLET, EXTENDED RELEASE ORAL 2 TIMES DAILY
Status: DISCONTINUED | OUTPATIENT
Start: 2024-01-18 | End: 2024-01-18

## 2024-01-18 RX ORDER — GABAPENTIN 100 MG/1
200 CAPSULE ORAL 3 TIMES DAILY
Status: DISCONTINUED | OUTPATIENT
Start: 2024-01-18 | End: 2024-01-23

## 2024-01-18 RX ORDER — POTASSIUM CHLORIDE 1500 MG/1
40 TABLET, EXTENDED RELEASE ORAL ONCE
Status: COMPLETED | OUTPATIENT
Start: 2024-01-18 | End: 2024-01-18

## 2024-01-18 RX ORDER — POTASSIUM CHLORIDE 1500 MG/1
20 TABLET, EXTENDED RELEASE ORAL 2 TIMES DAILY
Status: COMPLETED | OUTPATIENT
Start: 2024-01-19 | End: 2024-01-19

## 2024-01-18 RX ADMIN — MELATONIN TAB 3 MG 3 MG: 3 TAB at 21:05

## 2024-01-18 RX ADMIN — METOPROLOL TARTRATE 25 MG: 25 TABLET, FILM COATED ORAL at 21:05

## 2024-01-18 RX ADMIN — GABAPENTIN 200 MG: 100 CAPSULE ORAL at 18:05

## 2024-01-18 RX ADMIN — LIDOCAINE: 50 OINTMENT TOPICAL at 18:06

## 2024-01-18 RX ADMIN — METHOCARBAMOL 500 MG: 500 TABLET ORAL at 07:29

## 2024-01-18 RX ADMIN — HYDROMORPHONE HYDROCHLORIDE 4 MG: 2 TABLET ORAL at 18:09

## 2024-01-18 RX ADMIN — HYDROMORPHONE HYDROCHLORIDE 4 MG: 2 TABLET ORAL at 11:38

## 2024-01-18 RX ADMIN — HYDROMORPHONE HYDROCHLORIDE 4 MG: 2 TABLET ORAL at 00:04

## 2024-01-18 RX ADMIN — GABAPENTIN 200 MG: 100 CAPSULE ORAL at 21:05

## 2024-01-18 RX ADMIN — POTASSIUM CHLORIDE 40 MEQ: 1500 TABLET, EXTENDED RELEASE ORAL at 09:25

## 2024-01-18 RX ADMIN — HYDROMORPHONE HYDROCHLORIDE 4 MG: 2 TABLET ORAL at 21:05

## 2024-01-18 RX ADMIN — HYDROMORPHONE HYDROCHLORIDE 4 MG: 2 TABLET ORAL at 03:29

## 2024-01-18 RX ADMIN — PANTOPRAZOLE SODIUM 40 MG: 40 INJECTION, POWDER, FOR SOLUTION INTRAVENOUS at 21:06

## 2024-01-18 RX ADMIN — METOPROLOL TARTRATE 25 MG: 25 TABLET, FILM COATED ORAL at 09:25

## 2024-01-18 RX ADMIN — HYDROMORPHONE HYDROCHLORIDE 4 MG: 2 TABLET ORAL at 07:29

## 2024-01-18 RX ADMIN — LIDOCAINE: 50 OINTMENT TOPICAL at 09:28

## 2024-01-18 RX ADMIN — PANTOPRAZOLE SODIUM 40 MG: 40 INJECTION, POWDER, FOR SOLUTION INTRAVENOUS at 09:25

## 2024-01-18 RX ADMIN — HYDROMORPHONE HYDROCHLORIDE 4 MG: 2 TABLET ORAL at 14:43

## 2024-01-18 ASSESSMENT — ACTIVITIES OF DAILY LIVING (ADL)
ADLS_ACUITY_SCORE: 22
ADLS_ACUITY_SCORE: 24
ADLS_ACUITY_SCORE: 22
ADLS_ACUITY_SCORE: 22
ADLS_ACUITY_SCORE: 24
ADLS_ACUITY_SCORE: 22

## 2024-01-18 NOTE — PROGRESS NOTES
General surgery:  Overall feeling better.  Still feels very bloated but is tolerating clear liquids well.  Afebrile, vital stable.    Physical exam:  Still quite distended but a little bit softer.  Looks comfortable.  Abdomen is otherwise nontender.    Laboratories:  White blood count 29.1  Hemoglobin 9.3  Creatinine 4.0    Impression: Severe hemorrhagic pancreatitis.  His white count is still up but he is afebrile and I think this is just a result of the severe inflammation.  No obvious signs of pancreatic necrosis on his CT scan although this was done without contrast.  His creatinine is down just a bit today which is encouraging.  Again, as mentioned yesterday there are no plans for any surgery at this time.  All of this needs to resolve before we would think about cholecystectomy.

## 2024-01-18 NOTE — PROGRESS NOTES
"[facility] ACUTE PAIN SERVICE    [facility]   Daily PAIN Progress Note        AMCOM Paging/Directory Pain  Securely message with the [employer] Web Console (learn more here)  (When I saw the patient): 01/18/24  Assessment/Plan:  Talha Alexander seeing in follow up for  for acute pancreatitis s/p ECRP, complicated by pancreatic hemophage and acute kidney injury we are seeing him for acute abdominal pain with pain onset about 8 days ago .He reports he noted a \"yellowness to eyes and stools were gray. He he a healthy middle male with past medical history of obesity and tobacco dependence, snoring. He does snore and has no history of substance use disorder rarely drinks alcohol. His abdominal pain is significantly improved from yesterday. Labs are trending downward. He had had BM and urinating well, tolerating ice chips, skin color normal. Appreciate in put from general surgery, nephrology and gastroenterology. Counseled patient  on triggers that increase recurrence of pancreatitis. These include obesity, tobacco use and alcohol.  He was also encouraged to establish with a primary care provider and would also benefit from evaluation for sleep disordered breathing         Opioid Induced Respiratory Depression Risk Assessment:  (Low 0-1; Moderate 2-4; or High >4 or >/= 3 if two of the risk factors are age > 60 and opioid naive) due to the following risk factors: ROSA, COPD/Asthma/pulmonary disease, CHF, renal dysfunction, hepatic dysfunction, Obesity, Smoker, Age>60, >2 opioid therapies, concomitant CNS depressants, opioid naive status, or post surgical   ?   [place] pulled from system no controlled substance in last 12 months.   Chronic opioid use = 0 mg MME, opioid used this past 24 hours in the form of oral 20 mg Hydromorphone oral and  IV.0 mg = 80 mg MME     PLAN:   1) Pain is consistent with visceral, secondary to acute pancreatitis , in the setting of obesity and tobacco dependence. Treatment plan includes " "multimodal pain approach, medications as listed below, reviewed.  Discharge medications, advised keeping track of doses, educated on tapering off as pain improves, watch for constipation and stool softeners, no driving or alcohol with opioids, store medications in a safe place, advised to take no more than the prescribed dose as increased doses may cause respiratory depression or death. Patient is understanding of the plan. All questions and concerns addressed to patient's satisfaction.      2)Multimodal Medication Therapy  Topical:  lidocaine ointment tid  Adjuvants: CrCl is 30.5  mg/dl\", \"Tylenol 650 mg every 8hrs \", Hydroxyzine  not indicated  Muscle relaxer's not indicated. \" Gabapentin trial 200 mg tid ( renal dose about 700 mg/day  Antidepressants/anxiolytics: none      Opioids: Hydromorphone(Dilaudid) 2-4 mg every 4 hrs prn   IV Pain medication: Dilaudid discontinue.      3) Non-medication interventions- Ice, Heat, physical therapy, distraction aroma therapy   4) Interdisciplinary team care: Appreciate in put from general surgery, nephrology and gastroenterology.   5)Constipation Prophylaxis- senna and miralax. Continue to monitor.   6) Follow up   -acute pain team will continue to follow peripherally for next day if pain controlled will sign off.   -Opioid prescriber has been none. PCP is none      -Discharge Recommendations - We recommend prescribing the following at the time of discharge:    Dilaudid 2 mg every 4 hrs prn  #10 tablet(s)  Follow up with GI Nephrology   Establish with PCP tobacco cessation,weight loss sleep study evaluation   Disposition:      Prescribing at discharge: hospitalist           Subjective:  Still distended firm  Having BMs tolerating full liquids  Mininal abdominal pain tightness   Back lower across is what he is treating for pain            BRENDA (acute kidney injury) (H24)   Patient Active Problem List   Diagnosis    Acute pancreatitis    Calculus of gallbladder without " cholecystitis    Hyponatremia    Gallstone pancreatitis    Elevated LFTs    BRENDA (acute kidney injury) (H24)    Acute biliary pancreatitis, unspecified complication status        History   Drug Use Not on file         Tobacco Use      Smoking status: None      Smokeless tobacco: None         lidocaine   Topical TID    metoprolol tartrate  25 mg Oral BID    nicotine  1 patch Transdermal Daily    nicotine   Transdermal Q8H    pantoprazole  40 mg Intravenous BID    [START ON 1/19/2024] potassium chloride  20 mEq Oral BID    sodium chloride (PF)  3 mL Intracatheter Q8H       Objective:  Vital signs in last 24 hours:  B/P: 135/82, T: 97, P: 97, R: 16   Blood pressure 135/82, pulse 97, temperature 97  F (36.1  C), temperature source Oral, resp. rate 16, height 1.829 m (6'), weight 117.8 kg (259 lb 11.2 oz), SpO2 94%.      Weight:   Wt Readings from Last 3 Encounters:   01/18/24 117.8 kg (259 lb 11.2 oz)           Intake/Output:    Intake/Output Summary (Last 24 hours) at 1/18/2024 1254  Last data filed at 1/18/2024 1238  Gross per 24 hour   Intake 3192 ml   Output 2200 ml   Net 992 ml        Review of Systems:   As per subjective, all others negative.    Physical Exam:     General Appearance:  Alert, cooperative, no distress. Patient is pleasent grooming is good able to make needs know   Head:  Normocephalic, without obvious abnormality, atraumatic   Eyes:   Conjunctiva/corneas clear, EOM's intact   ENT/Throat: Lips, mouth moist     Lymph/Neck: Symmetrical, trachea midline, no adenopathy, thyroid: not enlarged, symmetric    Lungs:   Clear to auscultation bilaterally, respirations unlabored, even chest rise. Symmetrical movement    Chest Wall:  No tenderness or deformity   Cardiovascular/Heart:  Regular rate and rhythm, S1, S2 normal,no murmur, rub or gallop.     Abdomen:   Bowel sounds reduced, non tender distension, no organomegaly    Musculoskeletal:  JAY x 4    Skin: Skin color good, lesions good   Neurologic: Alert and  oriented X 3, Moves all 4 extremities        Psych: Affect is good aberrant pain behaviors, Yes             Imaging:  Personally Reviewed.       Results for orders placed or performed during the hospital encounter of 01/13/24   US Abdomen Limited    Impression    IMPRESSION:  1.  Cholelithiasis.  2.  Mildly heterogeneous liver with mild hepatic steatosis.  3.  Small ascites.  4.  Known pancreatitis was better assessed on the CT earlier today.       CT Abdomen Pelvis w/o Contrast    Impression    IMPRESSION:     1.  Acute pancreatitis with extensive peripancreatic inflammation as described. No organized peripancreatic fluid collections. This noncontrast exam cannot evaluate for parenchymal necrosis.    2.  Cholelithiasis. No biliary ductal dilation.    3.  Reactive wall thickening of the duodenum related to the pancreatitis.    4.  Small amount of ascites.    5.  Trace left pleural effusion with adjacent atelectasis.   MR Abdomen MRCP without Contrast    Impression    IMPRESSION:    1.  At least 2 choledocholiths within the distal common bile duct measuring up to 3 mm with mild upstream biliary ductal dilation.    2.  Acute hemorrhagic pancreatitis with extensive peripancreatic inflammation and fluid as described. No organized peripancreatic fluid collections. No pancreatic ductal dilation. This noncontrast exam cannot evaluate for parenchymal necrosis.    3.  Cholelithiasis, without evidence of acute cholecystitis.    4.  Reactive edema within the proximal duodenum related to the pancreatitis. A mildly dilated left upper quadrant jejunal loop is also likely a focal ileus related to the pancreatitis.    5.  Small amount of ascites.    6.  Trace left pleural effusion with adjacent atelectasis.   CT Abdomen Pelvis w/o Contrast    Impression    IMPRESSION:   1.  Nondiagnostic evaluation for pancreatic necrosis given noncontrast  technique.  2.  Since 1/13/2024, overall similar extensive acute hemorrhagic  pancreatitis  with surrounding ill-defined fluid and pockets of fluid  along the left upper quadrant/stomach greater curvature. No internal  gas to suggest superinfection.  3.  Cholelithiasis with probable stone in the cystic duct. Common bile  duct stent without significant biliary ductal dilatation.  4.  Pancreatic duct stent has migrated, now within the ascending  colon.  5.  Trace left pleural effusion with adjacent atelectasis. Increased  body wall edema.    LING RODRIGUEZ MD         SYSTEM ID:  [id number]          Lab Results:  Personally Reviewed.   Last Comprehensive Metabolic Panel:  Sodium   Date Value Ref Range Status   01/18/2024 130 (L) 135 - 145 mmol/L Final     Comment:     Reference intervals for this test were updated on 09/26/2023 to more accurately reflect our healthy population. There may be differences in the flagging of prior results with similar values performed with this method. Interpretation of those prior results can be made in the context of the updated reference intervals.      Potassium   Date Value Ref Range Status   01/18/2024 3.3 (L) 3.4 - 5.3 mmol/L Final     Chloride   Date Value Ref Range Status   01/18/2024 93 (L) 98 - 107 mmol/L Final     Carbon Dioxide (CO2)   Date Value Ref Range Status   01/18/2024 23 22 - 29 mmol/L Final     Anion Gap   Date Value Ref Range Status   01/18/2024 14 7 - 15 mmol/L Final     Glucose   Date Value Ref Range Status   01/18/2024 142 (H) 70 - 99 mg/dL Final     GLUCOSE BY METER POCT   Date Value Ref Range Status   01/16/2024 93 70 - 99 mg/dL Final     Urea Nitrogen   Date Value Ref Range Status   01/18/2024 70.4 (H) 6.0 - 20.0 mg/dL Final     Creatinine   Date Value Ref Range Status   01/18/2024 4.01 (H) 0.67 - 1.17 mg/dL Final     GFR Estimate   Date Value Ref Range Status   01/18/2024 18 (L) >60 mL/min/1.73m2 Final     Calcium   Date Value Ref Range Status   01/18/2024 7.5 (L) 8.6 - 10.0 mg/dL Final        UA:   Amphetamines Urine   Date Value Ref Range  Status   01/14/2024 Screen Negative Screen Negative Final     Comment:     Cutoff for a negative amphetamine is less than 500 ng/mL.     Barbituates Urine   Date Value Ref Range Status   01/14/2024 Screen Negative Screen Negative Final     Comment:     Cutoff for a negative barbiturate is less than 200 ng/mL.     Cannabinoids Urine   Date Value Ref Range Status   01/14/2024 Screen Negative Screen Negative Final     Comment:     Cutoff for a negative cannabinoid is less than 50 ng/mL.     Cocaine Urine   Date Value Ref Range Status   01/14/2024 Screen Negative Screen Negative Final     Comment:     Cutoff for a negative cocaine is less than 300 ng/mL.     Opiates Urine   Date Value Ref Range Status   01/14/2024 Screen Positive (A) Screen Negative Final     Comment:     Cutoff for a positive opiate is 300 ng/mL or greater.  This is an unconfirmed screening result to be used for medical purposes only.     PCP Urine   Date Value Ref Range Status   01/14/2024 Screen Negative Screen Negative Final     Comment:     Cutoff for a negative PCP is less than 25 ng/mL.            Critical decision making elements:  Use of high risk medications: Yes, Ongoing monitoring for adverse effects of medications by me: Yes, Relevant labs, imaging, notes reviewed by me:  Yes  Please see A&P for additional details of medical decision making.  35 MINUTES SPENT BY ME on the date of service doing chart review, history, exam, documentation & further activities per the note.  Communicated plan with hospitalist managing care? Yes  Communicated plan with bedside nurse?  Yes    Veronica Irizarry, MORENA CNP, PGMT-BC, ACHPN  [facility]   Coverage Monday-Friday 8:00-4:30  No weekend coverage contact house officer  [facility] Paging/Directory Pain  Securely message with the [employer] Web Console (learn more here)

## 2024-01-18 NOTE — PLAN OF CARE
Resumed cares from 2000-2330. A&O, VSS ex tachycardic at times. Tele: ST. Lung sounds clear, Bowel sounds normoactive, abdomen with mild/ moderate distention. hollingsworth in place w/ adequate urine output. NS w/ Bicarb infusing at 50ml/hr.  Ambulates SBA. Tolerating clear liquid Diet. Complains of back pain managed with PRN dilaudid & robaxin & scheduled lidocaine cream.

## 2024-01-18 NOTE — PLAN OF CARE
Goal Outcome Evaluation:      Plan of Care Reviewed With: patient    Overall Patient Progress: improvingOverall Patient Progress: improving    Outcome Evaluation: A&Ox4  SR on monitor, Room air maintaining sats in mid 90s, hollingsworth remain with adequate Urine output, Hydromorphone PO x 2 doses during for pain, remains on clear liquid diet, BM x1

## 2024-01-18 NOTE — PROGRESS NOTES
[facility]    Medicine Progress Note - Hospitalist Service    Date of Admission:  1/13/2024    Assessment & Plan   Pt is 28-year-old male with no significant past medical history presents with 2-day history of abdominal bloating,     Acute hemorrhagic biliary pancreatitis.  Cholelithiasis w/o cholecystitis.  Choledocholithiasis with 3 cm CBD dilatation.  Elevated LFTs.  Worsening leukocytosis  CT abdomen -extensive signs of acute pancreatitis, no choledocholithiasis noted but Cholelithiasis noted without cholecystitis.  Ultrasound of the abdomen was also obtained which did not indicate any choledocholithiasis.  Initial lipase 776.  White count 15.1 at presentation.  Bilirubin 6.1  [facility] following, appreciate input  -MRCP-with 2 choledocholithiasis with distal common bile duct measuring up to 3 mm with mild upstream biliary ductal dilatation.  Acute hemorrhagic pancreatitis with extensive peripancreatic inflammation and fluid as described, cholelithiasis without evidence of acute cholecystitis.  Reactive edema within the proximal duodenum related to pancreatitis  -ERCP-1/14 choledocholithiasis was found with complete removal with biliary sphincterotomy and biliary stent placed given severe edema.  Pancreatic stent placed.  Severe erosive gastropathy and duodenopathy was also found  -LFTs/total bilirubin improving and is 2.2 today  -IV PPI every 12 hours  -Given worsening leukocytosis, general surgery ordered CT abdomen pelvis without contrast.  No convincing evidence of necrosis but limited study because of no availability of contrast.  -Per GI infected pancreatic necrosis will usually occur 1 to 2 weeks after admit and since cultures so far negative and patient has been afebrile, GI recommended stopping Zosyn, will stop Zosyn.  -In abdomen pelvis CT done 1/17 the pancreatic duct has migrated now within the ascending colon.  Will defer to GI for further follow-up.  Also there is  cholelithiasis with probable stone in the cystic duct.  Will defer to GI for any further management of cystic duct stone  -Surgery following, appreciate input, given too much inflammation, holding off on laparoscopic cholecystectomy.  Defer timing to surgery  -Continue ambulation so as to get bowels moving  -Started on clear liquids, plan to advance as tolerated     Non oliguric Acute kidney injury  Hyponatremia  Nonanion gap metabolic acidosis, likely secondary to BRENDA  creatinine of 2.5 and sodium of 122 at presentation    - Cr peaked at 4.36, now gradually recovering  -nephrology following, appreciate input    Hypocalcemia  -Calcium this morning is 7.3, ionized calcium 4, slightly lower than normal  - slowly improving    Anemia  Unknown baseline  Hemoglobin was normal at presentation, gradually dropping likely secondary to hydration, but has stabilized around 9  Patient denies any active bleed, monitor for any further drop  -During ERCP patient was noted to have severe erosive gastropathy and duodenopathy as above  -Patient has been started on Protonix IV twice daily, will continue        Diet: Clear Liquid Diet  Snacks/Supplements Adult: Ensure Clear; With Meals    DVT Prophylaxis: Pneumatic Compression Devices  Cornejo Catheter: PRESENT, indication: Strict 1-2 Hour I&O  Lines: None     Cardiac Monitoring: None  Code Status: Full Code      Clinically Significant Risk Factors        # Hypokalemia: Lowest K = 3.3 mmol/L in last 2 days, will replace as needed  # Hyponatremia: Lowest Na = 128 mmol/L in last 2 days, will monitor as appropriate  # Hypocalcemia: Lowest iCa = 4 mg/dL in last 2 days, will monitor and replace as appropriate     # Hypoalbuminemia: Lowest albumin = 2.6 g/dL at 1/18/2024  5:56 AM, will monitor as appropriate      # Acute Kidney Injury, unspecified: based on a >150% or 0.3 mg/dL increase in last creatinine compared to past 90 day average, will monitor renal function         # Obesity: Estimated  body mass index is 35.22 kg/m  as calculated from the following:    Height as of this encounter: 1.829 m (6').    Weight as of this encounter: 117.8 kg (259 lb 11.2 oz).             Disposition Plan      Expected Discharge Date: 01/20/2024      Destination: home with family              Azaela Calvo, DO  Hospitalist Service  [facility]  Securely message with tidy (more info)  Text page via [employer] Paging/Directory   ______________________________________________________________________    Interval History   Patient reports overall he is feeling a little better. He is tolerating his clear diet and wants to try to eat more. No increase in pain. Ongoing diarrhea. Ongoing distention. He understands plan forward    Physical Exam   Vital Signs: Temp: 97  F (36.1  C) Temp src: Oral BP: 135/82 Pulse: 97   Resp: 16 SpO2: 94 % O2 Device: None (Room air)    Weight: 259 lbs 11.2 oz    Exam:  Constitutional: Awake, alert and no distress. Appears comfortable  Cardiovascular: RRR.  no murmurs, no rubs or JVD  Respiratory:normal WOB,b/l equal air entry, no wheezes or crackles   Gastrointestinal: Abdomen distended, generalized tenderness, no peritoneal signs. BS hypoactive. No masses, organomegaly  Extremities :no edema , no clubbing or cyanosis      Medical Decision Making       50 MINUTES SPENT BY ME on the date of service doing chart review, history, exam, documentation & further activities per the note.      Data     I have personally reviewed the following data over the past 24 hrs:    29.1 (H)  \   9.3 (L)   / 388     130 (L) 93 (L) 70.4 (H) /  142 (H)   3.3 (L) 23 4.01 (H) \     ALT: 43 AST: 51 (H) AP: 90 TBILI: 1.2   ALB: 2.6 (L) TOT PROTEIN: 5.4 (L) LIPASE: N/A       Imaging results reviewed over the past 24 hrs:   No results found for this or any previous visit (from the past 24 hour(s)).    Recent Labs   Lab 01/18/24  0556 01/17/24  0525 01/16/24  2255 01/16/24  0528 01/13/24  1738  01/13/24  1333   WBC 29.1* 27.0*  --  18.4*   < > 15.9*   HGB 9.3* 9.3*  --  9.5*   < > 14.3   MCV 85 85  --  86   < > 84    326  --  303   < > 227   * 128*  --  127*   < > 122*   POTASSIUM 3.3* 3.6  --  3.9   < > 4.4   CHLORIDE 93* 93*  --  94*   < > 87*   CO2 23 22  --  20*   < > 22   BUN 70.4* 82.0*  --  84.7*   < > 68.4*   CR 4.01* 4.30*  --  4.36*   < > 2.50*   ANIONGAP 14 13  --  13   < > 13   LUBNA 7.5* 7.3*  --  6.9*   < > 7.2*   * 103* 93 94   < > 141*   ALBUMIN 2.6* 2.8*  --  2.8*   < > 3.4*   PROTTOTAL 5.4* 5.5*  --  5.7*   < > 6.6   BILITOTAL 1.2 1.5*  --  1.7*   < > 6.1*   ALKPHOS 90 78  --  70   < > 106   ALT 43 50  --  61   < > 112*   AST 51* 60*  --  72*   < > 133*   LIPASE  --   --   --   --   --  776*    < > = values in this interval not displayed.

## 2024-01-18 NOTE — PROGRESS NOTES
Assessment and Plan:     BRENDA: associated with pancreatitis and biliary disease.   Na 130, K 3.3, HCO3 23. Cr: 4.24 > 4.36  >  4.30 > 4.01. Slow improvement.   UO 2050 ml.     Remove  hollingsworth. Follow labs. Replace K. Stop IVF.     Hyponatremia: resolving. Na now 130.             Interval History:   Gallstone pancreatitis: GI following. s/p ERCP 1/14/24 with biliary sphincterotomy, stone removal, PD/CBD stent placed. Taking po diet now. Surgery does not plan jim yet.                  Review of Systems:   Notes abd distention but no pain. No N or V. Taking po well.   Not SOB. Ambulating with assistance.           Medications:      lidocaine   Topical TID    metoprolol tartrate  25 mg Oral BID    nicotine  1 patch Transdermal Daily    nicotine   Transdermal Q8H    pantoprazole  40 mg Intravenous BID    sodium chloride (PF)  3 mL Intracatheter Q8H    sodium chloride (PF)  3 mL Intracatheter Q8H      NaCl 0.45 % 1,000 mL with sodium bicarbonate 100 mEq/L infusion 50 mL/hr at 01/17/24 1642     Current active medications and PTA medications reviewed, see medication list for details.            Physical Exam:   Vitals were reviewed  Patient Vitals for the past 24 hrs:   BP Temp Temp src Pulse Resp SpO2 Weight   01/18/24 1000 135/82 -- -- 97 16 94 % --   01/18/24 0925 123/78 -- -- 101 -- -- --   01/18/24 0800 113/66 -- -- 96 13 93 % --   01/18/24 0723 -- 97  F (36.1  C) Oral -- -- -- --   01/18/24 0620 -- -- -- -- -- -- 117.8 kg (259 lb 11.2 oz)   01/18/24 0600 (!) 116/90 -- -- 85 (!) 9 95 % --   01/18/24 0500 -- -- -- 88 11 92 % --   01/18/24 0400 136/82 -- -- 98 16 92 % --   01/18/24 0300 -- -- -- 89 13 94 % --   01/18/24 0200 119/77 -- -- 82 13 91 % --   01/18/24 0100 -- -- -- 86 13 92 % --   01/18/24 0000 115/75 98.6  F (37  C) Axillary 92 23 93 % --   01/17/24 2100 -- -- -- 90 12 93 % --   01/17/24 2000 135/85 -- -- -- -- -- --   01/17/24 1800 -- -- -- 104 19 93 % --   01/17/24 1637 -- 99.3  F (37.4  C) Oral --  -- -- --   24 1400 131/79 -- -- 90 12 91 % --   24 1201 (!) 153/84 -- -- 106 12 92 % --       Temp:  [97  F (36.1  C)-99.3  F (37.4  C)] 97  F (36.1  C)  Pulse:  [] 97  Resp:  [9-23] 16  BP: (113-153)/(66-90) 135/82  SpO2:  [91 %-95 %] 94 %    Temperatures:  Current - Temp: 97  F (36.1  C); Max - Temp  Av.3  F (36.8  C)  Min: 97  F (36.1  C)  Max: 99.3  F (37.4  C)  Respiration range: Resp  Av  Min: 9  Max: 23  Pulse range: Pulse  Av.1  Min: 82  Max: 106  Blood pressure range: Systolic (24hrs), Av , Min:113 , Max:153   ; Diastolic (24hrs), Av, Min:66, Max:90    Pulse oximetry range: SpO2  Av.7 %  Min: 91 %  Max: 95 %    I/O last 3 completed shifts:  In: 2638.67 [P.O.:1240; I.V.:1398.67]  Out:  [Urine:]      Intake/Output Summary (Last 24 hours) at 2024 1011  Last data filed at 2024 0946  Gross per 24 hour   Intake 3118.67 ml   Output 1750 ml   Net 1368.67 ml     alert and responsive  Lungs with clear BS  Cor RRR nl S1 S2 no M  Abd distended and tympanitic, + BS, non-tender  LE no edema         Wt Readings from Last 4 Encounters:   24 117.8 kg (259 lb 11.2 oz)          Data:          Lab Results   Component Value Date     2024     2024     2024    Lab Results   Component Value Date    CHLORIDE 93 2024    CHLORIDE 93 2024    CHLORIDE 94 2024    Lab Results   Component Value Date    BUN 70.4 2024    BUN 82.0 2024    BUN 84.7 2024      Lab Results   Component Value Date    POTASSIUM 3.3 2024    POTASSIUM 3.6 2024    POTASSIUM 3.9 2024    Lab Results   Component Value Date    CO2 23 2024    CO2 22 2024    CO2 20 2024    Lab Results   Component Value Date    CR 4.01 2024    CR 4.30 2024    CR 4.36 2024        Recent Labs   Lab Test 24  0556 24  0525 24  0528   WBC 29.1* 27.0* 18.4*   HGB 9.3* 9.3* 9.5*   HCT  26.7* 26.7* 27.8*   MCV 85 85 86    326 303     Recent Labs   Lab Test 01/18/24  0556 01/17/24  0525 01/16/24  0528 01/14/24  0546 01/13/24  1333   AST 51* 60* 72*   < > 133*   ALT 43 50 61   < > 112*   ALKPHOS 90 78 70   < > 106   BILITOTAL 1.2 1.5* 1.7*   < > 6.1*   RIAN  --   --   --   --  14*    < > = values in this interval not displayed.       Recent Labs   Lab Test 01/18/24  0556 01/17/24  0525 01/16/24  0528   MAG 2.5* 2.6* 2.5*     Recent Labs   Lab Test 01/15/24  0548   PHOS 5.3*     Recent Labs   Lab Test 01/18/24  0556 01/17/24  0525 01/16/24  0528   LUBNA 7.5* 7.3* 6.9*       Lab Results   Component Value Date    LUBNA 7.5 (L) 01/18/2024     Lab Results   Component Value Date    WBC 29.1 (H) 01/18/2024    HGB 9.3 (L) 01/18/2024    HCT 26.7 (L) 01/18/2024    MCV 85 01/18/2024     01/18/2024     Lab Results   Component Value Date     (L) 01/18/2024    POTASSIUM 3.3 (L) 01/18/2024    CHLORIDE 93 (L) 01/18/2024    CO2 23 01/18/2024     (H) 01/18/2024     Lab Results   Component Value Date    BUN 70.4 (H) 01/18/2024    CR 4.01 (H) 01/18/2024     Lab Results   Component Value Date    MAG 2.5 (H) 01/18/2024     Lab Results   Component Value Date    PHOS 5.3 (H) 01/15/2024       Creatinine   Date Value Ref Range Status   01/18/2024 4.01 (H) 0.67 - 1.17 mg/dL Final   01/17/2024 4.30 (H) 0.67 - 1.17 mg/dL Final   01/16/2024 4.36 (H) 0.67 - 1.17 mg/dL Final   01/15/2024 4.24 (H) 0.67 - 1.17 mg/dL Final   01/14/2024 3.61 (H) 0.67 - 1.17 mg/dL Final   01/14/2024 3.27 (H) 0.67 - 1.17 mg/dL Final       Attestation:  I have reviewed today's vital signs, notes, medications, labs and imaging.     Keenan Segovia MD

## 2024-01-18 NOTE — PLAN OF CARE
ORIENTATION/NEURO: A/o x 4  VITALS/TELE: VSS, Tele NSR  PAIN: Back pain managed with PRN PO dilaudid and topical lidocaine cream, new order for gabapentin  RESP: Room air, LS CTA  DIET: Advanced to full liquids, pt tolerating well  GI/: Abd round, distended, and tender. Denies nausea. Few loose BM's. Cornejo removed at 1045. Pt voiding well into urinal.  LINES/DRAINS: IV fluids discontinued. Bilat PIV's SL  LABS: WBC elevated 29.1, K 3.3- replaced per protocol. Mg 2.4, rechecks in AM  SKIN: WNL  ASSIST/AMBULATION: SBA w/ gait belt  FALL RISK: Yes  PLAN/DISPOSITION: IMC status discontinued. Assess tolerance to full liquids. Pain management. Encourage activity. Monitor for infection.       Goal Outcome Evaluation:      Plan of Care Reviewed With: patient    Overall Patient Progress: improving

## 2024-01-18 NOTE — PROGRESS NOTES
[facility] Progress Note     IMPRESSION:  49 yo male with severe gallstone pancreatitis s/p ERCP 24 with biliary sphincterotomy, stone removal, PD/CBD stent placed.  Initial imaging concerning for hemorrhagic complications and thus there was concern for possible compartment syndrome give the degree of abdominal distention, however today's exam is encouraging.  Ongoing leukocytosis with BC neg - highest risk of infection, ie infected necrosis ~ 1-2 wks after admit.       RECOMMENDATIONS:  -Continue to ADAT  -Would be preferable from an infectious risk to pull hollingsworth  -Would not rec empiric abxs presently  -Plan for abd xray  to check if PD stent is still in place    Total time spent in chart review, direct medical discussion, examination, and documentation was 20 minutes    Broderick Cameron DO   [facility]  Office [phone]    ________________________________________________________________________      SUBJECTIVE:  Feeling well today.  No pain with po intake.  +BS, loose, brown.      OBJECTIVE:  /78   Pulse 101   Temp 97  F (36.1  C) (Oral)   Resp (!) 9   Ht 1.829 m (6')   Wt 117.8 kg (259 lb 11.2 oz)   SpO2 95%   BMI 35.22 kg/m    Temp (24hrs), Av.3  F (36.8  C), Min:97  F (36.1  C), Max:99.3  F (37.4  C)    Patient Vitals for the past 72 hrs:   Weight   24 0620 117.8 kg (259 lb 11.2 oz)   24 0500 114.2 kg (251 lb 11.2 oz)       Intake/Output Summary (Last 24 hours) at 2024 1006  Last data filed at 2024 0946  Gross per 24 hour   Intake 3118.67 ml   Output 1750 ml   Net 1368.67 ml        PHYSICAL EXAM  GEN: Alert, oriented x3, communicative and in NAD.    ABD: Mod disten, +BS, no pain with palp    Additional Data:  I have reviewed the patient's new clinical lab results:     Recent Labs   Lab Test 24  0556 24  0525 24  0528   WBC 29.1* 27.0* 18.4*   HGB 9.3* 9.3* 9.5*   MCV 85 85 86    326 303     Recent Labs   Lab Test  01/18/24  0556 01/17/24  0525 01/16/24  0528   POTASSIUM 3.3* 3.6 3.9   CHLORIDE 93* 93* 94*   CO2 23 22 20*   BUN 70.4* 82.0* 84.7*   ANIONGAP 14 13 13     Recent Labs   Lab Test 01/18/24  0556 01/17/24  0525 01/16/24  0528 01/14/24  0546 01/13/24  1333   ALBUMIN 2.6* 2.8* 2.8*   < > 3.4*   BILITOTAL 1.2 1.5* 1.7*   < > 6.1*   ALT 43 50 61   < > 112*   AST 51* 60* 72*   < > 133*   LIPASE  --   --   --   --  776*    < > = values in this interval not displayed.

## 2024-01-19 LAB
ANION GAP SERPL CALCULATED.3IONS-SCNC: 11 MMOL/L (ref 7–15)
BUN SERPL-MCNC: 62.7 MG/DL (ref 6–20)
CALCIUM SERPL-MCNC: 7.4 MG/DL (ref 8.6–10)
CHLORIDE SERPL-SCNC: 96 MMOL/L (ref 98–107)
CREAT SERPL-MCNC: 3.74 MG/DL (ref 0.67–1.17)
DEPRECATED HCO3 PLAS-SCNC: 23 MMOL/L (ref 22–29)
EGFRCR SERPLBLD CKD-EPI 2021: 19 ML/MIN/1.73M2
ERYTHROCYTE [DISTWIDTH] IN BLOOD BY AUTOMATED COUNT: 15 % (ref 10–15)
GLUCOSE SERPL-MCNC: 164 MG/DL (ref 70–99)
HCT VFR BLD AUTO: 25.2 % (ref 40–53)
HGB BLD-MCNC: 8.7 G/DL (ref 13.3–17.7)
MAGNESIUM SERPL-MCNC: 2.4 MG/DL (ref 1.7–2.3)
MCH RBC QN AUTO: 29.3 PG (ref 26.5–33)
MCHC RBC AUTO-ENTMCNC: 34.5 G/DL (ref 31.5–36.5)
MCV RBC AUTO: 85 FL (ref 78–100)
PLATELET # BLD AUTO: 389 10E3/UL (ref 150–450)
POTASSIUM SERPL-SCNC: 3.4 MMOL/L (ref 3.4–5.3)
RBC # BLD AUTO: 2.97 10E6/UL (ref 4.4–5.9)
SODIUM SERPL-SCNC: 130 MMOL/L (ref 135–145)
WBC # BLD AUTO: 29.2 10E3/UL (ref 4–11)

## 2024-01-19 PROCEDURE — 99231 SBSQ HOSP IP/OBS SF/LOW 25: CPT | Performed by: PHYSICIAN ASSISTANT

## 2024-01-19 PROCEDURE — 250N000013 HC RX MED GY IP 250 OP 250 PS 637: Performed by: NURSE PRACTITIONER

## 2024-01-19 PROCEDURE — 36415 COLL VENOUS BLD VENIPUNCTURE: CPT | Performed by: STUDENT IN AN ORGANIZED HEALTH CARE EDUCATION/TRAINING PROGRAM

## 2024-01-19 PROCEDURE — 120N000013 HC R&B IMCU

## 2024-01-19 PROCEDURE — 250N000013 HC RX MED GY IP 250 OP 250 PS 637: Performed by: INTERNAL MEDICINE

## 2024-01-19 PROCEDURE — 99232 SBSQ HOSP IP/OBS MODERATE 35: CPT | Performed by: STUDENT IN AN ORGANIZED HEALTH CARE EDUCATION/TRAINING PROGRAM

## 2024-01-19 PROCEDURE — 80048 BASIC METABOLIC PNL TOTAL CA: CPT | Performed by: STUDENT IN AN ORGANIZED HEALTH CARE EDUCATION/TRAINING PROGRAM

## 2024-01-19 PROCEDURE — 99232 SBSQ HOSP IP/OBS MODERATE 35: CPT | Performed by: INTERNAL MEDICINE

## 2024-01-19 PROCEDURE — 250N000011 HC RX IP 250 OP 636: Performed by: INTERNAL MEDICINE

## 2024-01-19 PROCEDURE — 83735 ASSAY OF MAGNESIUM: CPT | Performed by: INTERNAL MEDICINE

## 2024-01-19 PROCEDURE — C9113 INJ PANTOPRAZOLE SODIUM, VIA: HCPCS | Performed by: INTERNAL MEDICINE

## 2024-01-19 PROCEDURE — 85027 COMPLETE CBC AUTOMATED: CPT | Performed by: STUDENT IN AN ORGANIZED HEALTH CARE EDUCATION/TRAINING PROGRAM

## 2024-01-19 RX ADMIN — HYDROMORPHONE HYDROCHLORIDE 4 MG: 2 TABLET ORAL at 04:24

## 2024-01-19 RX ADMIN — HYDROMORPHONE HYDROCHLORIDE 2 MG: 2 TABLET ORAL at 15:41

## 2024-01-19 RX ADMIN — POTASSIUM CHLORIDE 20 MEQ: 1500 TABLET, EXTENDED RELEASE ORAL at 09:03

## 2024-01-19 RX ADMIN — METOPROLOL TARTRATE 25 MG: 25 TABLET, FILM COATED ORAL at 21:31

## 2024-01-19 RX ADMIN — PANTOPRAZOLE SODIUM 40 MG: 40 INJECTION, POWDER, FOR SOLUTION INTRAVENOUS at 21:33

## 2024-01-19 RX ADMIN — HYDROMORPHONE HYDROCHLORIDE 2 MG: 2 TABLET ORAL at 11:12

## 2024-01-19 RX ADMIN — GABAPENTIN 200 MG: 100 CAPSULE ORAL at 09:02

## 2024-01-19 RX ADMIN — PANTOPRAZOLE SODIUM 40 MG: 40 INJECTION, POWDER, FOR SOLUTION INTRAVENOUS at 09:02

## 2024-01-19 RX ADMIN — POTASSIUM CHLORIDE 20 MEQ: 1500 TABLET, EXTENDED RELEASE ORAL at 21:31

## 2024-01-19 RX ADMIN — GABAPENTIN 200 MG: 100 CAPSULE ORAL at 15:41

## 2024-01-19 RX ADMIN — METOPROLOL TARTRATE 25 MG: 25 TABLET, FILM COATED ORAL at 09:03

## 2024-01-19 RX ADMIN — HYDROMORPHONE HYDROCHLORIDE 2 MG: 2 TABLET ORAL at 19:30

## 2024-01-19 RX ADMIN — GABAPENTIN 200 MG: 100 CAPSULE ORAL at 21:31

## 2024-01-19 ASSESSMENT — ACTIVITIES OF DAILY LIVING (ADL)
ADLS_ACUITY_SCORE: 24
ADLS_ACUITY_SCORE: 25
ADLS_ACUITY_SCORE: 25
ADLS_ACUITY_SCORE: 24
ADLS_ACUITY_SCORE: 25
ADLS_ACUITY_SCORE: 24
ADLS_ACUITY_SCORE: 25
ADLS_ACUITY_SCORE: 24
ADLS_ACUITY_SCORE: 25

## 2024-01-19 NOTE — PROGRESS NOTES
[facility]  [facility]  Gastroenterology Progress note    Interval History:      Patient clinically improving.  Tolerating full liquid diet.  WBC remains elevated at 29.      Vital Signs:      BP (!) 147/87   Pulse 101   Temp 98.8  F (37.1  C) (Oral)   Resp 18   Ht 1.829 m (6')   Wt 117.8 kg (259 lb 11.2 oz)   SpO2 94%   BMI 35.22 kg/m    Temp (24hrs), Av.6  F (37  C), Min:98.1  F (36.7  C), Max:99.1  F (37.3  C)    Patient Vitals for the past 72 hrs:   Weight   24 0620 117.8 kg (259 lb 11.2 oz)       Intake/Output Summary (Last 24 hours) at 2024 0757  Last data filed at 2024 0500  Gross per 24 hour   Intake 1913.33 ml   Output 1425 ml   Net 488.33 ml         Constitutional: NAD, comfortable  Cardiovascular: RRR, normal S1, S2   Respiratory: CTAB  Abdomen: soft, generalized tenderness, distended    Additional Comments:  ROS, FH, SH: See initial GI consult for details.    Laboratory Data:  Recent Labs   Lab Test 24  0538 24  0556 24  0525   WBC 29.2* 29.1* 27.0*   HGB 8.7* 9.3* 9.3*   MCV 85 85 85    388 326     Recent Labs   Lab Test 24  0538 24  1409 24  0556 24  0525   *  --  130* 128*   POTASSIUM 3.4 3.5 3.3* 3.6   CHLORIDE 96*  --  93* 93*   CO2 23  --  23 22   BUN 62.7*  --  70.4* 82.0*   CR 3.74*  --  4.01* 4.30*   ANIONGAP 11  --  14 13   LUBNA 7.4*  --  7.5* 7.3*     Recent Labs   Lab Test 24  0556 24  0525 24  0528 01/15/24  0548 24  0546 24  1333   ALBUMIN 2.6* 2.8* 2.8* 2.8*  2.8*   < > 3.4*   BILITOTAL 1.2 1.5* 1.7* 2.2*   < > 6.1*   DBIL  --   --   --  1.52*  --   --    ALT 43 50 61 71*   < > 112*   AST 51* 60* 72* 94*   < > 133*   ALKPHOS 90 78 70 73   < > 106   LIPASE  --   --   --   --   --  776*    < > = values in this interval not displayed.         Assessment:  49 yo male with severe gallstone pancreatitis s/p ERCP 24 with biliary sphincterotomy,  stone removal, PD/CBD stent placed.  Initial imaging with concern for hemorrhagic complications and possible compartment syndrome given abdominal distention but exam continues to improve.  Patient with persistent leukocytosis but negative blood cultures.  Plan:  -  Diet as tolerated.  -  Do not recommend empiric antibiotics at this time.  -  Abdominal x-ray 1/23 to check if pancreatic duct stent is still in place.  -  Pantoprazole 40 mg BID.  -  Dr. Chamberlain will be covering the weekend.    Total Time Spent: 10 minutes      MARYCARMEN Meyer  [facility]  Cell:  [phone], not available after 11:30AM at this number

## 2024-01-19 NOTE — PLAN OF CARE
[id number]    Orientation: A&Ox4  Vitals/Pain: AVSS on RA. Pt reporting back pain, managing with Dilaudid x 2.    Lines/Drains: 1 PIV x SL  LS: clear  GI/: on Full liquid diet- tolerating well. BS normoactive, x1 BM this shift. Pt having adequate urine output throughout shift.   Labs: on K, Mg electrolyte replacement protocol  Ambulation/Assist: SB-assist  Plan: Pain management. Monitor lab results.

## 2024-01-19 NOTE — PROGRESS NOTES
"CLINICAL NUTRITION SERVICES  -  ASSESSMENT NOTE    RECOMMENDATIONS FOR MD/PROVIDER TO ORDER:   Diet per MD   Recommendations Ordered by Registered Dietitian (RD):   Continue current supplements (Ensure Clear with meals)    Discussed consuming small frequent meals pending tolerance to re-introduction of solid foods. Noted MD updated diet to bariatric regular diet.   Future/Additional Recommendations:   May need to educate further on low fat diet in future/pending pancreatitis symptoms   Malnutrition:   % Weight Loss:  difficult to determine with fluctuating fluid status  % Intake:  <75% for > 7 days (moderate malnutrition)  Subcutaneous Fat Loss:  None observed  Muscle Loss:  None observed  Fluid Retention:  no edema    Malnutrition Diagnosis: Unable to determine due to difficult to determine wt trends     REASON FOR ASSESSMENT  Talha Alexander is a 48 year old male seen by Registered Dietitian for LOS.    No significant PMH.  Presents with severe gallstone pancreatitis and choledocholithiasis.    NUTRITION HISTORY    - Per H&P, pt reported nausea, abdominal bloating and tenderness for a few days PTA.  - Spoke with patient at bedside. Per patient, he ate very minimally about 3 or 4 days PTA due to nausea and feeling sick. He said he got down a little toast and a couple bites of steak but really couldn't tolerate any food. Prior to this eating was fairly normal. Unsure of any recent weight loss due to abdominal distention and current fluid status.    CURRENT NUTRITION ORDERS  Diet Order: Bariatric Regular  1/17-1/18 CLD  1/13-1/17 NPO  Supplements: Ensure clear w/ meals    Current Intake/Tolerance:  - Per patient he has been consuming all 3 of his Ensure clears w/ meals, \"if you're sending them, I'm consuming them\". Says he likes the rose flavor and wants to keep current supps. Encouraged continued consumption.  - Briefly discussed low fat diet in relation to pancreatitis. Encouraged patient to keep greasy, high fat " "foods to a minimum at this time and to order smaller, more frequent meals.    NUTRITION FOCUSED PHYSICAL ASSESSMENT FOR DIAGNOSING MALNUTRITION)  Yes         Observed:    No nutrition-related physical findings observed    ANTHROPOMETRICS  Height: 6' 0\"  Weight: 114.2 kg (251 lb 11.2 oz)  BMI 34  Weight Status:  Obesity Grade I BMI 30-34.9  IBW: 80.9 kg  % IBW: 141%  Weight History: limited data on wt hx.  Wt Readings from Last 10 Encounters:   01/18/24 117.8 kg (259 lb 11.2 oz)     LABS  Na 130 (L), BUN 62.7 (H), Cr 3.74 (H), GFR 19 (L), Mg 2.4 (H)    MEDICATIONS  Medications reviewed    ASSESSED NUTRITION NEEDS PER APPROVED PRACTICE GUIDELINES:  Dosing Weight 89.2 kg  Estimated Energy Needs: 0254-0281 kcals (20-25 Kcal/Kg)  Justification: maintenance R/t BMI  Estimated Protein Needs: 107-134 grams protein (1.2-1.5 g pro/Kg)  Justification: preservation of lean body mass  Estimated Fluid Needs: 1 mL/kcal or per provider pending fluid status    NUTRITION DIAGNOSIS:  Inadequate oral intake related to poor appetite, nausea, abdominal distention as evidenced by pt report of not eating 3-4 days PTA and being NPO the first 4 days of admit, need for ONS to help meet nutritional needs    NUTRITION INTERVENTIONS  Recommendations / Nutrition Prescription  See above    Implementation  - Nutrition education: briefly discussed low fat diet, however diet just advanced to bariatric regular per MD. Discussed ordering a protein food at each meal and encouraged consumption of supplements.  - Medical Food Supplement - continue  - Collaboration of care - quickly asked if regular diet was recommended for patient vs low fat vs low fiber    Nutrition Goals  Patient to consume 4-5 small meals/supplements per day    MONITORING AND EVALUATION:  Progress towards goals will be monitored and evaluated per protocol and Practice Guidelines    Allie Mercado RD, LD  Clinical Dietitian - [facility]  "

## 2024-01-19 NOTE — PROGRESS NOTES
[facility]    Medicine Progress Note - Hospitalist Service    Date of Admission:  1/13/2024    Assessment & Plan   Pt is 28-year-old male with no significant past medical history who presented with 2-day history of abdominal bloating found to have acute gallstone pancreatitis.     Acute hemorrhagic biliary pancreatitis.  Cholelithiasis w/o cholecystitis.  Choledocholithiasis with 3 cm CBD dilatation.  Elevated LFTs.  Leukocytosis  CT abdomen -extensive signs of acute pancreatitis, no choledocholithiasis noted but Cholelithiasis noted without cholecystitis.   -MRCP completed with choledocholithiasis with distal common bile duct measuring up to 3 mm with mild upstream biliary ductal dilatation.  Acute hemorrhagic pancreatitis with extensive peripancreatic inflammation and fluid as described, cholelithiasis without evidence of acute cholecystitis.  Reactive edema within the proximal duodenum related to pancreatitis>ERCP-1/14 choledocholithiasis was found with complete removal with biliary sphincterotomy and biliary stent placed given severe edema.  Pancreatic stent placed.  Severe erosive gastropathy and duodenopathy was also found    - GI continues to follow. Recommend follow up imaging 01/23  - LFTs almost normalized, stop checking  - diet continues to advance without increased pain  - encourage ambulation  - no signs of infection, currently monitoring leukocytosis of antibiotics     Non oliguric Acute kidney injury  ATN  Hyponatremia  Nonanion gap metabolic acidosis, likely secondary to BRENDA  creatinine of 2.5 and sodium of 122 at presentation    - Cr peaked at 4.36, now gradually recovering  - nephrology following, appreciate input    Hypocalcemia  - slowly improving    Acute Anemia unknown etiology  Unknown baseline PTA however on admission Hb 14    -gradual decline since admission to 8.7 today. No active bleeding identified  -During ERCP patient was noted to have severe erosive  gastropathy and duodenopathy as above  -Patient has been started on Protonix IV twice daily        Diet: Snacks/Supplements Adult: Ensure Clear; With Meals  Advance Diet as Tolerated: Regular Diet Adult; Bariatric Diet Regular    DVT Prophylaxis: Pneumatic Compression Devices  Cornejo Catheter: Not present  Lines: None     Cardiac Monitoring: None  Code Status: Full Code      Clinically Significant Risk Factors        # Hypokalemia: Lowest K = 3.3 mmol/L in last 2 days, will replace as needed       # Hypoalbuminemia: Lowest albumin = 2.6 g/dL at 1/18/2024  5:56 AM, will monitor as appropriate              # Obesity: Estimated body mass index is 35.22 kg/m  as calculated from the following:    Height as of this encounter: 1.829 m (6').    Weight as of this encounter: 117.8 kg (259 lb 11.2 oz).             Disposition Plan      Expected Discharge Date: 01/21/2024,  3:00 PM    Destination: home with family  Discharge Comments: home            Azalea Calvo DO  Hospitalist Service  [facility]  Securely message with [employer] (more info)  Text page via [employer] Paging/Directory   ______________________________________________________________________    Interval History   Patient reports no real change in his pain today, nothing worse with eating more. He appears more distended but denies feeling this way. Ongoing diarrhea. He walks to the bathroom without issues. He wants to eat a roast beef sandwich. No fever or chills    Physical Exam   Vital Signs: Temp: 98.2  F (36.8  C) Temp src: Oral BP: 135/76 Pulse: 94   Resp: 18 SpO2: 96 % O2 Device: None (Room air)    Weight: 259 lbs 11.2 oz    Exam:  Constitutional: Awake, alert and no distress. Appears comfortable  Cardiovascular: RRR.  no murmurs, no rubs or JVD  Respiratory:normal WOB,b/l equal air entry, no wheezes or crackles   Gastrointestinal: Abdomen distended, generalized tenderness, no peritoneal signs. BS hypoactive. No masses,  organomegaly  Extremities :no edema , no clubbing or cyanosis      Medical Decision Making       45 MINUTES SPENT BY ME on the date of service doing chart review, history, exam, documentation & further activities per the note.      Data     I have personally reviewed the following data over the past 24 hrs:    29.2 (H)  \   8.7 (L)   / 389     130 (L) 96 (L) 62.7 (H) /  164 (H)   3.4 23 3.74 (H) \     Procal: N/A CRP: N/A Lactic Acid: 1.8         Imaging results reviewed over the past 24 hrs:   No results found for this or any previous visit (from the past 24 hour(s)).    Recent Labs   Lab 01/19/24  0538 01/18/24  1409 01/18/24  0556 01/17/24  0525 01/13/24  1735 01/13/24  1333   WBC 29.2*  --  29.1* 27.0*   < > 15.9*   HGB 8.7*  --  9.3* 9.3*   < > 14.3   MCV 85  --  85 85   < > 84     --  388 326   < > 227   *  --  130* 128*   < > 122*   POTASSIUM 3.4 3.5 3.3* 3.6   < > 4.4   CHLORIDE 96*  --  93* 93*   < > 87*   CO2 23  --  23 22   < > 22   BUN 62.7*  --  70.4* 82.0*   < > 68.4*   CR 3.74*  --  4.01* 4.30*   < > 2.50*   ANIONGAP 11  --  14 13   < > 13   LUBNA 7.4*  --  7.5* 7.3*   < > 7.2*   *  --  142* 103*   < > 141*   ALBUMIN  --   --  2.6* 2.8*   < > 3.4*   PROTTOTAL  --   --  5.4* 5.5*   < > 6.6   BILITOTAL  --   --  1.2 1.5*   < > 6.1*   ALKPHOS  --   --  90 78   < > 106   ALT  --   --  43 50   < > 112*   AST  --   --  51* 60*   < > 133*   LIPASE  --   --   --   --   --  776*    < > = values in this interval not displayed.

## 2024-01-19 NOTE — PROGRESS NOTES
Assessment and Plan:     BRENDA: ATN. Cr: 4.30 > 4.01 > 3.74. Na 130, K 3.4, HCO3 23.   CT  >  KIDNEYS: No collecting system dilatation. Urinary bladder is  decompressed by Cornejo catheter.  I/O .     K replacement.   Off IVF              Interval History:   Pancreatitis: Surg and GI following. Gallstone pancreatitis.  WBC 29.2.    Anemia: Hgb 8.7. Check Fe B12 and folate.            Review of Systems:   On full liquid diet. No abd pain, N or V. No SOB.           Medications:      gabapentin  200 mg Oral TID    lidocaine   Topical TID    metoprolol tartrate  25 mg Oral BID    nicotine  1 patch Transdermal Daily    nicotine   Transdermal Q8H    pantoprazole  40 mg Intravenous BID    potassium chloride  20 mEq Oral BID    sodium chloride (PF)  3 mL Intracatheter Q8H       Current active medications and PTA medications reviewed, see medication list for details.            Physical Exam:   Vitals were reviewed  Patient Vitals for the past 24 hrs:   BP Temp Temp src Pulse Resp SpO2   24 0830 135/82 98.3  F (36.8  C) Oral 98 16 99 %   24 0509 -- -- -- -- 18 --   24 0400 (!) 147/87 98.8  F (37.1  C) Oral 101 20 94 %   24 0000 119/77 98.7  F (37.1  C) Oral 92 15 95 %   24 1900 130/80 99.1  F (37.3  C) Oral 98 18 95 %   24 1531 134/79 98.1  F (36.7  C) Oral 94 18 94 %   24 1456 -- 98.5  F (36.9  C) Oral -- -- --   24 1316 123/81 -- -- 98 21 95 %   24 1000 135/82 -- -- 97 16 94 %   24 0925 123/78 -- -- 101 -- --       Temp:  [98.1  F (36.7  C)-99.1  F (37.3  C)] 98.3  F (36.8  C)  Pulse:  [] 98  Resp:  [15-21] 16  BP: (119-147)/(77-87) 135/82  SpO2:  [94 %-99 %] 99 %    Temperatures:  Current - Temp: 98.3  F (36.8  C); Max - Temp  Av.6  F (37  C)  Min: 98.1  F (36.7  C)  Max: 99.1  F (37.3  C)  Respiration range: Resp  Av.8  Min: 15  Max: 21  Pulse range: Pulse  Av.4  Min: 92  Max: 101  Blood pressure range: Systolic (24hrs),  Av , Min:119 , Max:147   ; Diastolic (24hrs), Av, Min:77, Max:87    Pulse oximetry range: SpO2  Av.1 %  Min: 94 %  Max: 99 %    I/O last 3 completed shifts:  In: 1913.33 [P.O.:1680; I.V.:233.33]  Out: 1425 [Urine:1425]      Intake/Output Summary (Last 24 hours) at 2024 0917  Last data filed at 2024 0832  Gross per 24 hour   Intake 1913.33 ml   Output 1725 ml   Net 188.33 ml     Alert and responsive  Resting comfortably in bed         Wt Readings from Last 4 Encounters:   24 117.8 kg (259 lb 11.2 oz)          Data:          Lab Results   Component Value Date     2024     2024     2024    Lab Results   Component Value Date    CHLORIDE 96 2024    CHLORIDE 93 2024    CHLORIDE 93 2024    Lab Results   Component Value Date    BUN 62.7 2024    BUN 70.4 2024    BUN 82.0 2024      Lab Results   Component Value Date    POTASSIUM 3.4 2024    POTASSIUM 3.5 2024    POTASSIUM 3.3 2024    Lab Results   Component Value Date    CO2 23 2024    CO2 23 2024    CO2 22 2024    Lab Results   Component Value Date    CR 3.74 2024    CR 4.01 2024    CR 4.30 2024        Recent Labs   Lab Test 24  0538 24  0556 24  0525   WBC 29.2* 29.1* 27.0*   HGB 8.7* 9.3* 9.3*   HCT 25.2* 26.7* 26.7*   MCV 85 85 85    388 326     Recent Labs   Lab Test 24  0556 24  0525 24  0528 24  0546 24  1333   AST 51* 60* 72*   < > 133*   ALT 43 50 61   < > 112*   ALKPHOS 90 78 70   < > 106   BILITOTAL 1.2 1.5* 1.7*   < > 6.1*   RIAN  --   --   --   --  14*    < > = values in this interval not displayed.       Recent Labs   Lab Test 24  0538 24  0556 24  0525   MAG 2.4* 2.5* 2.6*     Recent Labs   Lab Test 01/15/24  0548   PHOS 5.3*     Recent Labs   Lab Test 24  0538 24  0556 24  0525   LUBNA 7.4* 7.5* 7.3*       Lab Results    Component Value Date    LUBNA 7.4 (L) 01/19/2024     Lab Results   Component Value Date    WBC 29.2 (H) 01/19/2024    HGB 8.7 (L) 01/19/2024    HCT 25.2 (L) 01/19/2024    MCV 85 01/19/2024     01/19/2024     Lab Results   Component Value Date     (L) 01/19/2024    POTASSIUM 3.4 01/19/2024    CHLORIDE 96 (L) 01/19/2024    CO2 23 01/19/2024     (H) 01/19/2024     Lab Results   Component Value Date    BUN 62.7 (H) 01/19/2024    CR 3.74 (H) 01/19/2024     Lab Results   Component Value Date    MAG 2.4 (H) 01/19/2024     Lab Results   Component Value Date    PHOS 5.3 (H) 01/15/2024       Creatinine   Date Value Ref Range Status   01/19/2024 3.74 (H) 0.67 - 1.17 mg/dL Final   01/18/2024 4.01 (H) 0.67 - 1.17 mg/dL Final   01/17/2024 4.30 (H) 0.67 - 1.17 mg/dL Final   01/16/2024 4.36 (H) 0.67 - 1.17 mg/dL Final   01/15/2024 4.24 (H) 0.67 - 1.17 mg/dL Final   01/14/2024 3.61 (H) 0.67 - 1.17 mg/dL Final       Attestation:  I have reviewed today's vital signs, notes, medications, labs and imaging.     Keenan Segovia MD

## 2024-01-19 NOTE — PROGRESS NOTES
General Surgery Progress Note    Admission Date: 1/13/2024  Today's Date: 1/19/2024         Assessment:      Talha Alexander is a 48 year old male with severe hemorrhagic pancreatitis. White count still elevated at 29 today, but clinically patient is doing better. Tolerated full liquid diet yesterday without nausea or increased pain. Creatinine slowly trending downward.         Plan:   - Continue with management of pancreatitis. Appreciate GI input  - No plan for surgical intervention at this time. Patient will need to fully recover from this episode of severe pancreatitis before planning cholecystectomy  - I have provided our contact information in his AVS. Recommend following-up in our clinic 1 month after hospital discharge for surgical consultation. Discussed with patient, he is in agreement with this plan    - Diet as tolerated, per GI. Likely will advance to low fiber today. Discussed with patient continuing with small portions and monitoring closely for recurrence of symptoms as he is advancing his oral intake. Recommend following low fat diet.  - Encourage frequent out of bed, ambulation, wear PCDs while resting.    - Medical management per hospitalist  - No further input from general surgery at this time. We will sign off, please call with questions        Interval History:   Tmax 99.1, heart rate 90s up to 101, blood pressures stable. Talha reports having a good day yesterday. He enjoyed getting to have some full liquids, is eager to try solid food. He reports that he does notice that he has to be cautious with the amount he eats, but he didn't experience any nausea/pain/increased bloating yesterday with PO intake. Denies any abdominal pain or nausea, but main complaint is severe distention that hasn't improved much yet. Low back is also sore from laying in bed. He has been up in the chair, walking frequently.          Physical Exam:   BP (!) 147/87   Pulse 101   Temp 98.8  F (37.1  C) (Oral)   Resp 18     1.829 m (6')   Wt 117.8 kg (259 lb 11.2 oz)   SpO2 94%   BMI 35.22 kg/m    I/O last 3 completed shifts:  In: 1913.33 [P.O.:1680; I.V.:233.33]  Out: 1425 [Urine:1425]  General: NAD, pleasant, alert and oriented x3  Respiratory: non-labored breathing  Abdomen: still moderately distended and fairly firm, nontender to palpation.     LABS:  Recent Labs   Lab Test 01/19/24  0538 01/18/24  0556 01/17/24  0525   WBC 29.2* 29.1* 27.0*   HGB 8.7* 9.3* 9.3*   MCV 85 85 85    388 326      Recent Labs   Lab Test 01/19/24  0538 01/18/24  1409 01/18/24  0556 01/17/24  0525   POTASSIUM 3.4 3.5 3.3* 3.6   CHLORIDE 96*  --  93* 93*   CO2 23  --  23 22   BUN 62.7*  --  70.4* 82.0*   CR 3.74*  --  4.01* 4.30*   ANIONGAP 11  --  14 13          -------------------------------    Dahlia López PA-C  [facility]  [phone]

## 2024-01-20 LAB
ANION GAP SERPL CALCULATED.3IONS-SCNC: 10 MMOL/L (ref 7–15)
BUN SERPL-MCNC: 55.6 MG/DL (ref 6–20)
CALCIUM SERPL-MCNC: 7.5 MG/DL (ref 8.6–10)
CHLORIDE SERPL-SCNC: 99 MMOL/L (ref 98–107)
CREAT SERPL-MCNC: 3.68 MG/DL (ref 0.67–1.17)
DEPRECATED HCO3 PLAS-SCNC: 23 MMOL/L (ref 22–29)
EGFRCR SERPLBLD CKD-EPI 2021: 20 ML/MIN/1.73M2
ERYTHROCYTE [DISTWIDTH] IN BLOOD BY AUTOMATED COUNT: 15.2 % (ref 10–15)
FERRITIN SERPL-MCNC: 1596 NG/ML (ref 31–409)
FOLATE SERPL-MCNC: 16.2 NG/ML (ref 4.6–34.8)
GLUCOSE SERPL-MCNC: 116 MG/DL (ref 70–99)
HCT VFR BLD AUTO: 25.1 % (ref 40–53)
HGB BLD-MCNC: 8.6 G/DL (ref 13.3–17.7)
IRON BINDING CAPACITY (ROCHE): 148 UG/DL (ref 240–430)
IRON SATN MFR SERPL: 16 % (ref 15–46)
IRON SERPL-MCNC: 24 UG/DL (ref 61–157)
MCH RBC QN AUTO: 29.6 PG (ref 26.5–33)
MCHC RBC AUTO-ENTMCNC: 34.3 G/DL (ref 31.5–36.5)
MCV RBC AUTO: 86 FL (ref 78–100)
PLATELET # BLD AUTO: 419 10E3/UL (ref 150–450)
POTASSIUM SERPL-SCNC: 3.5 MMOL/L (ref 3.4–5.3)
RBC # BLD AUTO: 2.91 10E6/UL (ref 4.4–5.9)
SODIUM SERPL-SCNC: 132 MMOL/L (ref 135–145)
VIT B12 SERPL-MCNC: 1410 PG/ML (ref 232–1245)
WBC # BLD AUTO: 26.9 10E3/UL (ref 4–11)

## 2024-01-20 PROCEDURE — 250N000013 HC RX MED GY IP 250 OP 250 PS 637: Performed by: NURSE PRACTITIONER

## 2024-01-20 PROCEDURE — 82728 ASSAY OF FERRITIN: CPT | Performed by: INTERNAL MEDICINE

## 2024-01-20 PROCEDURE — 83540 ASSAY OF IRON: CPT | Performed by: INTERNAL MEDICINE

## 2024-01-20 PROCEDURE — 85027 COMPLETE CBC AUTOMATED: CPT | Performed by: INTERNAL MEDICINE

## 2024-01-20 PROCEDURE — 250N000013 HC RX MED GY IP 250 OP 250 PS 637: Performed by: INTERNAL MEDICINE

## 2024-01-20 PROCEDURE — C9113 INJ PANTOPRAZOLE SODIUM, VIA: HCPCS | Performed by: INTERNAL MEDICINE

## 2024-01-20 PROCEDURE — 258N000003 HC RX IP 258 OP 636: Performed by: INTERNAL MEDICINE

## 2024-01-20 PROCEDURE — 80048 BASIC METABOLIC PNL TOTAL CA: CPT | Performed by: INTERNAL MEDICINE

## 2024-01-20 PROCEDURE — 82607 VITAMIN B-12: CPT | Performed by: INTERNAL MEDICINE

## 2024-01-20 PROCEDURE — 99232 SBSQ HOSP IP/OBS MODERATE 35: CPT | Performed by: STUDENT IN AN ORGANIZED HEALTH CARE EDUCATION/TRAINING PROGRAM

## 2024-01-20 PROCEDURE — 120N000001 HC R&B MED SURG/OB

## 2024-01-20 PROCEDURE — 250N000009 HC RX 250: Performed by: PAIN MEDICINE

## 2024-01-20 PROCEDURE — 250N000011 HC RX IP 250 OP 636: Performed by: INTERNAL MEDICINE

## 2024-01-20 PROCEDURE — 83550 IRON BINDING TEST: CPT | Performed by: INTERNAL MEDICINE

## 2024-01-20 PROCEDURE — 36415 COLL VENOUS BLD VENIPUNCTURE: CPT | Performed by: INTERNAL MEDICINE

## 2024-01-20 PROCEDURE — 82746 ASSAY OF FOLIC ACID SERUM: CPT | Performed by: INTERNAL MEDICINE

## 2024-01-20 RX ADMIN — HYDROMORPHONE HYDROCHLORIDE 2 MG: 2 TABLET ORAL at 19:53

## 2024-01-20 RX ADMIN — GABAPENTIN 200 MG: 100 CAPSULE ORAL at 21:44

## 2024-01-20 RX ADMIN — GABAPENTIN 200 MG: 100 CAPSULE ORAL at 09:21

## 2024-01-20 RX ADMIN — IRON SUCROSE 300 MG: 20 INJECTION, SOLUTION INTRAVENOUS at 13:38

## 2024-01-20 RX ADMIN — GABAPENTIN 200 MG: 100 CAPSULE ORAL at 15:33

## 2024-01-20 RX ADMIN — HYDROMORPHONE HYDROCHLORIDE 4 MG: 2 TABLET ORAL at 06:03

## 2024-01-20 RX ADMIN — METOPROLOL TARTRATE 25 MG: 25 TABLET, FILM COATED ORAL at 21:45

## 2024-01-20 RX ADMIN — PANTOPRAZOLE SODIUM 40 MG: 40 INJECTION, POWDER, FOR SOLUTION INTRAVENOUS at 21:45

## 2024-01-20 RX ADMIN — HYDROMORPHONE HYDROCHLORIDE 2 MG: 2 TABLET ORAL at 12:50

## 2024-01-20 RX ADMIN — METOPROLOL TARTRATE 25 MG: 25 TABLET, FILM COATED ORAL at 09:22

## 2024-01-20 RX ADMIN — HYDROMORPHONE HYDROCHLORIDE 4 MG: 2 TABLET ORAL at 00:58

## 2024-01-20 RX ADMIN — PANTOPRAZOLE SODIUM 40 MG: 40 INJECTION, POWDER, FOR SOLUTION INTRAVENOUS at 09:21

## 2024-01-20 RX ADMIN — LIDOCAINE: 50 OINTMENT TOPICAL at 21:45

## 2024-01-20 ASSESSMENT — ACTIVITIES OF DAILY LIVING (ADL)
ADLS_ACUITY_SCORE: 22
ADLS_ACUITY_SCORE: 25
ADLS_ACUITY_SCORE: 22
ADLS_ACUITY_SCORE: 25

## 2024-01-20 NOTE — PLAN OF CARE
Pt A&Ox 4. CMS intact. VSS on RA.Tele SR. Crackles on the LLL. Abdomin extended. Regular diet. Got discomfort after eating solid. Using dilaudid for pain. Independent. Voiding in the BR.

## 2024-01-20 NOTE — PLAN OF CARE
Goal Outcome Evaluation:  DATE & TIME:1/19-1/20, [id number]  Cognitive Concerns/ Orientation: A/O x4  BEHAVIOR & AGGRESSION TOOL COLOR:Green, Calm and cooperative  ABNL VS/O2:VSS on RA ex BP slightly elevated  MOBILITY:Independent  PAIN MANAGMENT: Given dilaudid x2 for back pain  DIET:Regular  BOWEL/BLADDER:Continent B/B.   DRAIN/DEVICES:R PIV SL-CDI  TELEMETRY RHYTHM: NSR  SKIN: WDL  TESTS/PROCEDURES:Na 130, Cr 3.74, Ca 7.4, mag 2.4, wbc 29.2, hg 8.7  D/C DATE:  Pending  OTHER IMPORTANT INFO : K and mag protocols.

## 2024-01-20 NOTE — PROGRESS NOTES
Gastroenterology Progress Note    Summary: 49 yo w pancreatitis d/t gallstones sp ercp w sphincterotomy and stent placement. Course notable for leukocytosis, ileus.    Impression & Plan:   Pancreatitis.  Pain much improved.  Tolerating oral intake.  Low-fat diet  Leukocytosis.  Improving daily, likely reactive.  Cultures negative.  Monitor white blood cell count daily  Ileus.  Distended abdomen, unclear in comparison to prior days.  He is having small bowel movements and tolerating oral intake.  Would monitor closely.  Encouraged mobility  Avoid high-fiber foods  Daily exam    20 min spent on care of patient today.     Kelsi Chamberlain MD MS....................  2024   1:32 PM   Cell [phone]  After 5 PM call [phone]    [facility]          Subjective/24 hour events: Having bowel movements, tolerating oral intake    Objective:    /80 (BP Location: Right arm, Patient Position: Supine, Cuff Size: Adult Regular)   Pulse 99   Temp 98.6  F (37  C) (Oral)   Resp 15   Ht 1.829 m (6')   Wt 117.8 kg (259 lb 11.2 oz)   SpO2 95%   BMI 35.22 kg/m    Temp (24hrs), Av.5  F (36.9  C), Min:98  F (36.7  C), Max:98.9  F (37.2  C)    Abdomen distended and tense, bowel sounds present    Patient Vitals for the past 72 hrs:   Weight   24 0620 117.8 kg (259 lb 11.2 oz)       Medications (Scheduled)   gabapentin  200 mg Oral TID    iron sucrose  300 mg Intravenous Daily    lidocaine   Topical TID    metoprolol tartrate  25 mg Oral BID    nicotine  1 patch Transdermal Daily    nicotine   Transdermal Q8H    pantoprazole  40 mg Intravenous BID    sodium chloride (PF)  3 mL Intracatheter Q8H       PRN Medications  HYDROmorphone **OR** HYDROmorphone, lidocaine 4%, lidocaine (buffered or not buffered), melatonin, methocarbamol, naloxone **OR** naloxone **OR** naloxone **OR** naloxone, nicotine, nitroGLYcerin, ondansetron **OR** ondansetron, prochlorperazine **OR** prochlorperazine **OR**  prochlorperazine, senna-docusate **OR** senna-docusate    Physical exam:  General Appearance:  alert, oriented  Eyes: nl  Respiratory: nl  CV: nl  Gastrointestinal: soft    Data:  All relevant data has been reviewed. Pertinent information:     Labs:   Recent Labs   Lab Test 01/20/24  0549 01/19/24  0538 01/18/24  0556   WBC 26.9* 29.2* 29.1*   HGB 8.6* 8.7* 9.3*   MCV 86 85 85    389 388     Recent Labs   Lab Test 01/20/24  0549 01/19/24  0538 01/18/24  1409 01/18/24  0556   POTASSIUM 3.5 3.4 3.5 3.3*   CHLORIDE 99 96*  --  93*   CO2 23 23  --  23   BUN 55.6* 62.7*  --  70.4*   ANIONGAP 10 11  --  14     Recent Labs   Lab Test 01/18/24  0556 01/17/24  0525 01/16/24  0528 01/14/24  0546 01/13/24  1333   ALBUMIN 2.6* 2.8* 2.8*   < > 3.4*   BILITOTAL 1.2 1.5* 1.7*   < > 6.1*   ALT 43 50 61   < > 112*   AST 51* 60* 72*   < > 133*   LIPASE  --   --   --   --  776*    < > = values in this interval not displayed.

## 2024-01-20 NOTE — PROGRESS NOTES
[facility]    Medicine Progress Note - Hospitalist Service    Date of Admission:  1/13/2024    Assessment & Plan   Pt is 28-year-old male with no significant past medical history who presented with 2-day history of abdominal bloating found to have acute gallstone pancreatitis.     Acute hemorrhagic biliary pancreatitis.  Cholelithiasis w/o cholecystitis.  Choledocholithiasis with 3 cm CBD dilatation.  Elevated LFTs.  Leukocytosis  CT abdomen -extensive signs of acute pancreatitis, no choledocholithiasis noted but Cholelithiasis without cholecystitis.   -MRCP completed with choledocholithiasis with distal common bile duct measuring up to 3 mm with mild upstream biliary ductal dilatation.  Acute hemorrhagic pancreatitis with extensive peripancreatic inflammation and fluid as described, cholelithiasis without evidence of acute cholecystitis.  Reactive edema within the proximal duodenum related to pancreatitis>ERCP-1/14 choledocholithiasis was found with complete removal with biliary sphincterotomy and biliary stent placed given severe edema.  Pancreatic stent placed.  Severe erosive gastropathy and duodenopathy was also found    - GI continues to follow. Recommend follow up imaging 01/23 however with ongoing daily improvement appreciate follow up today on dispo plans  - LFTs almost normalized, stop checking  - now on regular diet and tolerating  - encourage ambulation  - no signs of infection, currently monitoring leukocytosis off antibiotics     Non oliguric Acute kidney injury  ATN  Hyponatremia  Nonanion gap metabolic acidosis, likely secondary to BRENDA  creatinine of 2.5 and sodium of 122 at presentation    - Cr peaked at 4.36, now gradually recovering  - nephrology following, appreciate input    Hypocalcemia  - slowly improving    Acute Anemia unknown etiology  Unknown baseline PTA however on admission Hb 14    -gradual decline since admission. No active bleeding identified  -During ERCP  patient was noted to have severe erosive gastropathy and duodenopathy as above and started on Protonix IV twice daily        Diet: Snacks/Supplements Adult: Ensure Clear; With Meals  Advance Diet as Tolerated: Regular Diet Adult; Bariatric Diet Regular    DVT Prophylaxis: Pneumatic Compression Devices  Cornejo Catheter: Not present  Lines: None     Cardiac Monitoring: None  Code Status: Full Code      Clinically Significant Risk Factors              # Hypoalbuminemia: Lowest albumin = 2.6 g/dL at 1/18/2024  5:56 AM, will monitor as appropriate              # Obesity: Estimated body mass index is 35.22 kg/m  as calculated from the following:    Height as of this encounter: 1.829 m (6').    Weight as of this encounter: 117.8 kg (259 lb 11.2 oz).             Disposition Plan      Expected Discharge Date: 01/21/2024,  3:00 PM    Destination: home with family  Discharge Comments: home            Azalea Calvo DO  Hospitalist Service  [facility]  Securely message with [employer] (more info)  Text page via [employer] Paging/Directory   ______________________________________________________________________    Interval History   Diet advanced and patient tolerating. he getting a lot of pain medications and on my assessment he can't keep his eyes open. Dw nursing taper of dilaudid. No fever or chills. He is aware of need for on going monitoring    Physical Exam   Vital Signs: Temp: 98.6  F (37  C) Temp src: Oral BP: 131/80 Pulse: 99   Resp: 15 SpO2: 95 % O2 Device: None (Room air)    Weight: 259 lbs 11.2 oz    Exam:  Constitutional: Awake, alert and no distress. Appears comfortable  Cardiovascular: RRR.  no murmurs, no rubs or JVD  Respiratory:normal WOB,b/l equal air entry, no wheezes or crackles   Gastrointestinal: Abdomen distended but major pain  Extremities :no edema , no clubbing or cyanosis      Medical Decision Making       45 MINUTES SPENT BY ME on the date of service doing chart review, history,  exam, documentation & further activities per the note.      Data     I have personally reviewed the following data over the past 24 hrs:    26.9 (H)  \   8.6 (L)   / 419     132 (L) 99 55.6 (H) /  116 (H)   3.5 23 3.68 (H) \       Imaging results reviewed over the past 24 hrs:   No results found for this or any previous visit (from the past 24 hour(s)).    Recent Labs   Lab 01/20/24  0549 01/19/24  0538 01/18/24  1409 01/18/24  0556 01/17/24  0525 01/13/24  1735 01/13/24  1333   WBC 26.9* 29.2*  --  29.1* 27.0*   < > 15.9*   HGB 8.6* 8.7*  --  9.3* 9.3*   < > 14.3   MCV 86 85  --  85 85   < > 84    389  --  388 326   < > 227   * 130*  --  130* 128*   < > 122*   POTASSIUM 3.5 3.4 3.5 3.3* 3.6   < > 4.4   CHLORIDE 99 96*  --  93* 93*   < > 87*   CO2 23 23  --  23 22   < > 22   BUN 55.6* 62.7*  --  70.4* 82.0*   < > 68.4*   CR 3.68* 3.74*  --  4.01* 4.30*   < > 2.50*   ANIONGAP 10 11  --  14 13   < > 13   LUBNA 7.5* 7.4*  --  7.5* 7.3*   < > 7.2*   * 164*  --  142* 103*   < > 141*   ALBUMIN  --   --   --  2.6* 2.8*   < > 3.4*   PROTTOTAL  --   --   --  5.4* 5.5*   < > 6.6   BILITOTAL  --   --   --  1.2 1.5*   < > 6.1*   ALKPHOS  --   --   --  90 78   < > 106   ALT  --   --   --  43 50   < > 112*   AST  --   --   --  51* 60*   < > 133*   LIPASE  --   --   --   --   --   --  776*    < > = values in this interval not displayed.      15-Nov-2021

## 2024-01-20 NOTE — PROGRESS NOTES
Pt A&Ox4.administered scheduled meds.No PRN during my shift administered.Denies SOB.Pt abdominal severely distended with rigid.Per pt abdominal discomfort.Pt tolerated diet.Tele NSR.K and mag protocol.[facility] RN given report.Pt transferred to [facility] with all belongings.

## 2024-01-21 ENCOUNTER — APPOINTMENT (OUTPATIENT)
Dept: GENERAL RADIOLOGY | Facility: CLINIC | Age: 49
End: 2024-01-21
Attending: INTERNAL MEDICINE
Payer: COMMERCIAL

## 2024-01-21 LAB
ALBUMIN UR-MCNC: 30 MG/DL
ANION GAP SERPL CALCULATED.3IONS-SCNC: 10 MMOL/L (ref 7–15)
APPEARANCE UR: CLEAR
BACTERIA #/AREA URNS HPF: ABNORMAL /HPF
BILIRUB UR QL STRIP: NEGATIVE
BUN SERPL-MCNC: 55 MG/DL (ref 6–20)
CALCIUM SERPL-MCNC: 7.5 MG/DL (ref 8.6–10)
CHLORIDE SERPL-SCNC: 99 MMOL/L (ref 98–107)
COLOR UR AUTO: ABNORMAL
CREAT SERPL-MCNC: 3.59 MG/DL (ref 0.67–1.17)
DEPRECATED HCO3 PLAS-SCNC: 21 MMOL/L (ref 22–29)
EGFRCR SERPLBLD CKD-EPI 2021: 20 ML/MIN/1.73M2
ERYTHROCYTE [DISTWIDTH] IN BLOOD BY AUTOMATED COUNT: 15 % (ref 10–15)
GLUCOSE SERPL-MCNC: 126 MG/DL (ref 70–99)
GLUCOSE UR STRIP-MCNC: NEGATIVE MG/DL
HCT VFR BLD AUTO: 23.8 % (ref 40–53)
HGB BLD-MCNC: 8.2 G/DL (ref 13.3–17.7)
HGB UR QL STRIP: ABNORMAL
KETONES UR STRIP-MCNC: NEGATIVE MG/DL
LACTATE SERPL-SCNC: 1 MMOL/L (ref 0.7–2)
LEUKOCYTE ESTERASE UR QL STRIP: NEGATIVE
MCH RBC QN AUTO: 29.8 PG (ref 26.5–33)
MCHC RBC AUTO-ENTMCNC: 34.5 G/DL (ref 31.5–36.5)
MCV RBC AUTO: 87 FL (ref 78–100)
NITRATE UR QL: NEGATIVE
PH UR STRIP: 5.5 [PH] (ref 5–7)
PLATELET # BLD AUTO: 444 10E3/UL (ref 150–450)
POTASSIUM SERPL-SCNC: 3.5 MMOL/L (ref 3.4–5.3)
RBC # BLD AUTO: 2.75 10E6/UL (ref 4.4–5.9)
RBC URINE: 1 /HPF
SODIUM SERPL-SCNC: 130 MMOL/L (ref 135–145)
SP GR UR STRIP: 1.01 (ref 1–1.03)
SQUAMOUS EPITHELIAL: <1 /HPF
UROBILINOGEN UR STRIP-MCNC: NORMAL MG/DL
WBC # BLD AUTO: 22.6 10E3/UL (ref 4–11)
WBC URINE: 3 /HPF

## 2024-01-21 PROCEDURE — 250N000011 HC RX IP 250 OP 636: Performed by: INTERNAL MEDICINE

## 2024-01-21 PROCEDURE — 250N000013 HC RX MED GY IP 250 OP 250 PS 637: Performed by: PAIN MEDICINE

## 2024-01-21 PROCEDURE — 99232 SBSQ HOSP IP/OBS MODERATE 35: CPT | Performed by: STUDENT IN AN ORGANIZED HEALTH CARE EDUCATION/TRAINING PROGRAM

## 2024-01-21 PROCEDURE — 80048 BASIC METABOLIC PNL TOTAL CA: CPT | Performed by: INTERNAL MEDICINE

## 2024-01-21 PROCEDURE — 250N000013 HC RX MED GY IP 250 OP 250 PS 637: Performed by: NURSE PRACTITIONER

## 2024-01-21 PROCEDURE — 120N000001 HC R&B MED SURG/OB

## 2024-01-21 PROCEDURE — 87149 DNA/RNA DIRECT PROBE: CPT | Performed by: STUDENT IN AN ORGANIZED HEALTH CARE EDUCATION/TRAINING PROGRAM

## 2024-01-21 PROCEDURE — 83605 ASSAY OF LACTIC ACID: CPT | Performed by: STUDENT IN AN ORGANIZED HEALTH CARE EDUCATION/TRAINING PROGRAM

## 2024-01-21 PROCEDURE — 258N000003 HC RX IP 258 OP 636: Performed by: INTERNAL MEDICINE

## 2024-01-21 PROCEDURE — 87186 SC STD MICRODIL/AGAR DIL: CPT | Performed by: STUDENT IN AN ORGANIZED HEALTH CARE EDUCATION/TRAINING PROGRAM

## 2024-01-21 PROCEDURE — 250N000013 HC RX MED GY IP 250 OP 250 PS 637: Performed by: INTERNAL MEDICINE

## 2024-01-21 PROCEDURE — 250N000011 HC RX IP 250 OP 636: Performed by: STUDENT IN AN ORGANIZED HEALTH CARE EDUCATION/TRAINING PROGRAM

## 2024-01-21 PROCEDURE — 74019 RADEX ABDOMEN 2 VIEWS: CPT

## 2024-01-21 PROCEDURE — 81003 URINALYSIS AUTO W/O SCOPE: CPT | Performed by: STUDENT IN AN ORGANIZED HEALTH CARE EDUCATION/TRAINING PROGRAM

## 2024-01-21 PROCEDURE — 36415 COLL VENOUS BLD VENIPUNCTURE: CPT | Performed by: STUDENT IN AN ORGANIZED HEALTH CARE EDUCATION/TRAINING PROGRAM

## 2024-01-21 PROCEDURE — 85027 COMPLETE CBC AUTOMATED: CPT | Performed by: STUDENT IN AN ORGANIZED HEALTH CARE EDUCATION/TRAINING PROGRAM

## 2024-01-21 PROCEDURE — C9113 INJ PANTOPRAZOLE SODIUM, VIA: HCPCS | Performed by: INTERNAL MEDICINE

## 2024-01-21 RX ORDER — PIPERACILLIN SODIUM, TAZOBACTAM SODIUM 3; .375 G/15ML; G/15ML
3.38 INJECTION, POWDER, LYOPHILIZED, FOR SOLUTION INTRAVENOUS EVERY 6 HOURS
Status: DISCONTINUED | OUTPATIENT
Start: 2024-01-21 | End: 2024-01-24

## 2024-01-21 RX ADMIN — HYDROMORPHONE HYDROCHLORIDE 2 MG: 2 TABLET ORAL at 19:02

## 2024-01-21 RX ADMIN — IRON SUCROSE 300 MG: 20 INJECTION, SOLUTION INTRAVENOUS at 08:13

## 2024-01-21 RX ADMIN — HYDROMORPHONE HYDROCHLORIDE 2 MG: 2 TABLET ORAL at 11:27

## 2024-01-21 RX ADMIN — PANTOPRAZOLE SODIUM 40 MG: 40 INJECTION, POWDER, FOR SOLUTION INTRAVENOUS at 08:08

## 2024-01-21 RX ADMIN — HYDROMORPHONE HYDROCHLORIDE 2 MG: 2 TABLET ORAL at 08:30

## 2024-01-21 RX ADMIN — GABAPENTIN 200 MG: 100 CAPSULE ORAL at 22:05

## 2024-01-21 RX ADMIN — GABAPENTIN 200 MG: 100 CAPSULE ORAL at 15:37

## 2024-01-21 RX ADMIN — METOPROLOL TARTRATE 25 MG: 25 TABLET, FILM COATED ORAL at 22:05

## 2024-01-21 RX ADMIN — PIPERACILLIN AND TAZOBACTAM 3.38 G: 3; .375 INJECTION, POWDER, FOR SOLUTION INTRAVENOUS at 22:05

## 2024-01-21 RX ADMIN — PIPERACILLIN AND TAZOBACTAM 3.38 G: 3; .375 INJECTION, POWDER, FOR SOLUTION INTRAVENOUS at 17:06

## 2024-01-21 RX ADMIN — HYDROMORPHONE HYDROCHLORIDE 2 MG: 2 TABLET ORAL at 02:12

## 2024-01-21 RX ADMIN — GABAPENTIN 200 MG: 100 CAPSULE ORAL at 08:08

## 2024-01-21 RX ADMIN — LIDOCAINE: 50 OINTMENT TOPICAL at 22:22

## 2024-01-21 RX ADMIN — METOPROLOL TARTRATE 25 MG: 25 TABLET, FILM COATED ORAL at 08:09

## 2024-01-21 RX ADMIN — HYDROMORPHONE HYDROCHLORIDE 2 MG: 2 TABLET ORAL at 15:40

## 2024-01-21 RX ADMIN — PANTOPRAZOLE SODIUM 40 MG: 40 INJECTION, POWDER, FOR SOLUTION INTRAVENOUS at 22:05

## 2024-01-21 RX ADMIN — METHOCARBAMOL 500 MG: 500 TABLET ORAL at 02:12

## 2024-01-21 ASSESSMENT — ACTIVITIES OF DAILY LIVING (ADL)
ADLS_ACUITY_SCORE: 22

## 2024-01-21 NOTE — PLAN OF CARE
Date & Time: 1900-0730.  Surgery/POD#: here w/ pancreatitis due to gallstones w/ sphincterotomy and stent placement.  Behavior & Aggression: Green - no concerns.  Fall Risk: No.  Orientation:A&Ox4.  ABNL VS/O2: VSS on RA.  ABNL Labs: see chart.  Pain Management: PRN Dilaudid & Robaxin.  Bowel/Bladder: Continent of B/B, active bowel sounds, no BM, passing gas per pt.  IV/Drains/Skin: PIV SL. Skin is jaundice, noted pretty distended abdomen.   Diet: Bariatric Regular.  Activity Level: IND.  Tests/Procedures: N/A.  Anticipated  DC Date: TBD.

## 2024-01-21 NOTE — PROGRESS NOTES
Gastroenterology Progress Note    Summary: 48-year-old male with pancreatitis secondary to gallstones status post ERCP with sphincterotomy, stent placement.  Course notable for leukocytosis -improving, ileus.    Impression & Plan:   Pancreatitis.  Pain continues to improve.  Tolerating oral intake.  Plan for eventual cholecystectomy as an outpatient for surgery.  Ileus.  Abdomen more distended today.    Will repeat x-ray today.  If worsening ileus, he will need to pull back on his oral intake.    20 min spent on care of patient today.     Kelsi Chamberlain MD MS....................  2024   12:34 PM   Cell [phone]  After 5 PM call [phone]    [facility]          Subjective/24 hour events:    Reports having bowel movement, tolerating oral intake, minimal pain.    Objective:    /83   Pulse 104   Temp (!) 100.9  F (38.3  C) (Axillary)   Resp 18   Ht 1.829 m (6')   Wt 117.8 kg (259 lb 11.2 oz)   SpO2 95%   BMI 35.22 kg/m    Temp (24hrs), Av.8  F (37.7  C), Min:99.2  F (37.3  C), Max:100.9  F (38.3  C)    No data found.    Medications (Scheduled)   gabapentin  200 mg Oral TID    lidocaine   Topical TID    metoprolol tartrate  25 mg Oral BID    nicotine  1 patch Transdermal Daily    nicotine   Transdermal Q8H    pantoprazole  40 mg Intravenous BID    sodium chloride (PF)  3 mL Intracatheter Q8H       PRN Medications  HYDROmorphone **OR** HYDROmorphone, lidocaine 4%, lidocaine (buffered or not buffered), melatonin, methocarbamol, naloxone **OR** naloxone **OR** naloxone **OR** naloxone, nicotine, nitroGLYcerin, ondansetron **OR** ondansetron, prochlorperazine **OR** prochlorperazine **OR** prochlorperazine, senna-docusate **OR** senna-docusate    Physical exam:  General Appearance:  alert, oriented  Eyes: nl  Respiratory: nl  CV: nl  Gastrointestinal: soft    Data:  All relevant data has been reviewed. Pertinent information:     Labs:   Recent Labs   Lab Test 24  0648  01/20/24  0549 01/19/24  0538   WBC 22.6* 26.9* 29.2*   HGB 8.2* 8.6* 8.7*   MCV 87 86 85    419 389     Recent Labs   Lab Test 01/21/24  0648 01/20/24  0549 01/19/24  0538   POTASSIUM 3.5 3.5 3.4   CHLORIDE 99 99 96*   CO2 21* 23 23   BUN 55.0* 55.6* 62.7*   ANIONGAP 10 10 11     Recent Labs   Lab Test 01/18/24  0556 01/17/24  0525 01/16/24  0528 01/14/24  0546 01/13/24  1333   ALBUMIN 2.6* 2.8* 2.8*   < > 3.4*   BILITOTAL 1.2 1.5* 1.7*   < > 6.1*   ALT 43 50 61   < > 112*   AST 51* 60* 72*   < > 133*   LIPASE  --   --   --   --  776*    < > = values in this interval not displayed.

## 2024-01-21 NOTE — PROGRESS NOTES
Date & Time: 1100-1930  Surgery/POD#: Gallstones Pancreatitis  s/p ercwith stent placement   Behavior & Aggression: Green  Fall Risk: No  Orientation:x4  ABNL VS/O2:Room air  ABNL Labs: See chart  Pain Management:PO Dilaudid   Bowel/Bladder: Continent  Drains: PIV  Diet:Low fat/ Mod carb   Activity Level: Ind  Tests/Procedures: NA  Anticipated  DC Date: Pending   Significant Information: Patient with distended abdomen, minor umbilical bulging per patient its new and thinks is due to the distention.

## 2024-01-21 NOTE — PROGRESS NOTES
[facility]    Medicine Progress Note - Hospitalist Service    Date of Admission:  1/13/2024    Assessment & Plan   Pt is 28-year-old male with no significant past medical history who presented with 2-day history of abdominal bloating found to have acute gallstone pancreatitis.     Acute hemorrhagic biliary pancreatitis.  Cholelithiasis w/o cholecystitis.  Choledocholithiasis with 3 cm CBD dilatation.  Elevated LFTs.  Leukocytosis  CT abdomen -extensive signs of acute pancreatitis, no choledocholithiasis noted but Cholelithiasis without cholecystitis.   -MRCP completed with choledocholithiasis with distal common bile duct measuring up to 3 mm with mild upstream biliary ductal dilatation.  Acute hemorrhagic pancreatitis with extensive peripancreatic inflammation and fluid as described, cholelithiasis without evidence of acute cholecystitis.  Reactive edema within the proximal duodenum related to pancreatitis>ERCP-1/14 choledocholithiasis was found with complete removal with biliary sphincterotomy and biliary stent placed given severe edema.  Pancreatic stent placed.  Severe erosive gastropathy and duodenopathy was also found    - GI following, recommends follow up imaging 01/23  - LFTs almost normalized on last check, stop checking  - encourage ambulation  - 1 time fever today however leukocytosis continues to improve daily off antibiotics. Check blood and urine cultures. Continue to monitor off antibiotics unless recurrent fever or positive signs of infection  - will need follow up with surgery after discharge to discuss timing of lap jim     Non oliguric Acute kidney injury  ATN  Hyponatremia  Nonanion gap metabolic acidosis, likely secondary to BRENDA  creatinine of 2.5 and sodium of 122 at presentation. No previous labs to compare    - Cr peaked at 4.36, Cr slowly improving however no change in Cr today  - nephrology following, appreciate input  - will check UA today given  fever    Hypocalcemia  - low normal when corrected for albumin  - monitor    Acute Anemia unknown etiology  Unknown baseline  however on admission Hb 14    - gradual decline since admission. No active bleeding identified  - During ERCP patient was noted to have severe erosive gastropathy and duodenopathy as above and started on Protonix IV twice daily  - s/p venofer per nephrology        Diet: Snacks/Supplements Adult: Ensure Clear; With Meals  Advance Diet as Tolerated: Regular Diet Adult; Bariatric Diet Regular    DVT Prophylaxis: Pneumatic Compression Devices  [place]ornejo Catheter: Not present  Lines: None     Cardiac Monitoring: None  Code Status: Full Code      Clinically Significant Risk Factors              # Hypoalbuminemia: Lowest albumin = 2.6 g/dL at 1/18/2024  5:56 AM, will monitor as appropriate              # Obesity: Estimated body mass index is 35.22 kg/m  as calculated from the following:    Height as of this encounter: 1.829 m (6').    Weight as of this encounter: 117.8 kg (259 lb 11.2 oz).             Disposition Plan      Expected Discharge Date: 01/22/2024,  3:00 PM    Destination: home with family  Discharge Comments: home            Azalea Calvo DO  Hospitalist Service  [facility]  Securely message with [employer] (more info)  Text page via [employer] Paging/Directory   ______________________________________________________________________    Interval History   Patient about the same today. Tapered down to 2mg of dilaudid. Denies nausea. Not aware of fever. He is not rush to leave    Physical Exam   Vital Signs: Temp: (!) 100.9  F (38.3  C) Temp src: Axillary BP: 132/83 Pulse: 104   Resp: 18 SpO2: 95 % O2 Device: None (Room air)    Weight: 259 lbs 11.2 oz    Exam:  Constitutional: Awake, alert and no distress. Appears comfortable  Cardiovascular: RRR.  no murmurs, no rubs or JVD  Respiratory:normal WOB,b/l equal air entry, no wheezes or crackles   Gastrointestinal: Abdomen  distended no pain palpation  Extremities :no edema , no clubbing or cyanosis      Medical Decision Making       45 MINUTES SPENT BY ME on the date of service doing chart review, history, exam, documentation & further activities per the note.      Data     I have personally reviewed the following data over the past 24 hrs:    22.6 (H)  \   8.2 (L)   / 444     130 (L) 99 55.0 (H) /  126 (H)   3.5 21 (L) 3.59 (H) \     Ferritin:  N/A % Retic:  N/A LDH:  N/A       Imaging results reviewed over the past 24 hrs:   No results found for this or any previous visit (from the past 24 hour(s)).    Recent Labs   Lab 01/21/24  0648 01/20/24  0549 01/19/24  0538 01/18/24  1409 01/18/24  0556 01/17/24  0525   WBC 22.6* 26.9* 29.2*  --  29.1* 27.0*   HGB 8.2* 8.6* 8.7*  --  9.3* 9.3*   MCV 87 86 85  --  85 85    419 389  --  388 326   * 132* 130*  --  130* 128*   POTASSIUM 3.5 3.5 3.4   < > 3.3* 3.6   CHLORIDE 99 99 96*  --  93* 93*   CO2 21* 23 23  --  23 22   BUN 55.0* 55.6* 62.7*  --  70.4* 82.0*   CR 3.59* 3.68* 3.74*  --  4.01* 4.30*   ANIONGAP 10 10 11  --  14 13   LUBNA 7.5* 7.5* 7.4*  --  7.5* 7.3*   * 116* 164*  --  142* 103*   ALBUMIN  --   --   --   --  2.6* 2.8*   PROTTOTAL  --   --   --   --  5.4* 5.5*   BILITOTAL  --   --   --   --  1.2 1.5*   ALKPHOS  --   --   --   --  90 78   ALT  --   --   --   --  43 50   AST  --   --   --   --  51* 60*    < > = values in this interval not displayed.

## 2024-01-22 ENCOUNTER — APPOINTMENT (OUTPATIENT)
Dept: ULTRASOUND IMAGING | Facility: CLINIC | Age: 49
End: 2024-01-22
Attending: STUDENT IN AN ORGANIZED HEALTH CARE EDUCATION/TRAINING PROGRAM
Payer: COMMERCIAL

## 2024-01-22 LAB
ALBUMIN SERPL BCG-MCNC: 2.7 G/DL (ref 3.5–5.2)
ALP SERPL-CCNC: 87 U/L (ref 40–150)
ALT SERPL W P-5'-P-CCNC: 36 U/L (ref 0–70)
ANION GAP SERPL CALCULATED.3IONS-SCNC: 10 MMOL/L (ref 7–15)
AST SERPL W P-5'-P-CCNC: 63 U/L (ref 0–45)
BILIRUB DIRECT SERPL-MCNC: 0.48 MG/DL (ref 0–0.3)
BILIRUB SERPL-MCNC: 0.9 MG/DL
BUN SERPL-MCNC: 50.8 MG/DL (ref 6–20)
CALCIUM SERPL-MCNC: 7.8 MG/DL (ref 8.6–10)
CHLORIDE SERPL-SCNC: 102 MMOL/L (ref 98–107)
CREAT SERPL-MCNC: 3.6 MG/DL (ref 0.67–1.17)
DEPRECATED HCO3 PLAS-SCNC: 23 MMOL/L (ref 22–29)
EGFRCR SERPLBLD CKD-EPI 2021: 20 ML/MIN/1.73M2
ENTEROCOCCUS FAECALIS: NOT DETECTED
ENTEROCOCCUS FAECIUM: NOT DETECTED
ERYTHROCYTE [DISTWIDTH] IN BLOOD BY AUTOMATED COUNT: 15.2 % (ref 10–15)
GLUCOSE SERPL-MCNC: 118 MG/DL (ref 70–99)
HCT VFR BLD AUTO: 23.5 % (ref 40–53)
HGB BLD-MCNC: 8.1 G/DL (ref 13.3–17.7)
LISTERIA SPECIES (DETECTED/NOT DETECTED): NOT DETECTED
MCH RBC QN AUTO: 29.7 PG (ref 26.5–33)
MCHC RBC AUTO-ENTMCNC: 34.5 G/DL (ref 31.5–36.5)
MCV RBC AUTO: 86 FL (ref 78–100)
MRSA DNA SPEC QL NAA+PROBE: NEGATIVE
PLATELET # BLD AUTO: 501 10E3/UL (ref 150–450)
POTASSIUM SERPL-SCNC: 3.2 MMOL/L (ref 3.4–5.3)
PROT SERPL-MCNC: 5.6 G/DL (ref 6.4–8.3)
RBC # BLD AUTO: 2.73 10E6/UL (ref 4.4–5.9)
SA TARGET DNA: NEGATIVE
SODIUM SERPL-SCNC: 135 MMOL/L (ref 135–145)
STAPHYLOCOCCUS AUREUS: NOT DETECTED
STAPHYLOCOCCUS EPIDERMIDIS: NOT DETECTED
STAPHYLOCOCCUS LUGDUNENSIS: NOT DETECTED
STAPHYLOCOCCUS SPECIES: DETECTED
STREPTOCOCCUS AGALACTIAE: NOT DETECTED
STREPTOCOCCUS ANGINOSUS GROUP: NOT DETECTED
STREPTOCOCCUS PNEUMONIAE: NOT DETECTED
STREPTOCOCCUS PYOGENES: NOT DETECTED
STREPTOCOCCUS SPECIES: NOT DETECTED
WBC # BLD AUTO: 21 10E3/UL (ref 4–11)

## 2024-01-22 PROCEDURE — 82374 ASSAY BLOOD CARBON DIOXIDE: CPT | Performed by: STUDENT IN AN ORGANIZED HEALTH CARE EDUCATION/TRAINING PROGRAM

## 2024-01-22 PROCEDURE — 84132 ASSAY OF SERUM POTASSIUM: CPT | Performed by: STUDENT IN AN ORGANIZED HEALTH CARE EDUCATION/TRAINING PROGRAM

## 2024-01-22 PROCEDURE — C9113 INJ PANTOPRAZOLE SODIUM, VIA: HCPCS | Performed by: INTERNAL MEDICINE

## 2024-01-22 PROCEDURE — 99232 SBSQ HOSP IP/OBS MODERATE 35: CPT | Performed by: INTERNAL MEDICINE

## 2024-01-22 PROCEDURE — 99233 SBSQ HOSP IP/OBS HIGH 50: CPT | Performed by: STUDENT IN AN ORGANIZED HEALTH CARE EDUCATION/TRAINING PROGRAM

## 2024-01-22 PROCEDURE — 250N000011 HC RX IP 250 OP 636: Performed by: STUDENT IN AN ORGANIZED HEALTH CARE EDUCATION/TRAINING PROGRAM

## 2024-01-22 PROCEDURE — 85027 COMPLETE CBC AUTOMATED: CPT | Performed by: STUDENT IN AN ORGANIZED HEALTH CARE EDUCATION/TRAINING PROGRAM

## 2024-01-22 PROCEDURE — 258N000003 HC RX IP 258 OP 636: Performed by: STUDENT IN AN ORGANIZED HEALTH CARE EDUCATION/TRAINING PROGRAM

## 2024-01-22 PROCEDURE — 250N000011 HC RX IP 250 OP 636: Performed by: INTERNAL MEDICINE

## 2024-01-22 PROCEDURE — 87640 STAPH A DNA AMP PROBE: CPT | Performed by: STUDENT IN AN ORGANIZED HEALTH CARE EDUCATION/TRAINING PROGRAM

## 2024-01-22 PROCEDURE — 87641 MR-STAPH DNA AMP PROBE: CPT | Performed by: STUDENT IN AN ORGANIZED HEALTH CARE EDUCATION/TRAINING PROGRAM

## 2024-01-22 PROCEDURE — 250N000013 HC RX MED GY IP 250 OP 250 PS 637: Performed by: NURSE PRACTITIONER

## 2024-01-22 PROCEDURE — 82040 ASSAY OF SERUM ALBUMIN: CPT | Performed by: NURSE PRACTITIONER

## 2024-01-22 PROCEDURE — 93970 EXTREMITY STUDY: CPT

## 2024-01-22 PROCEDURE — 250N000013 HC RX MED GY IP 250 OP 250 PS 637: Performed by: PAIN MEDICINE

## 2024-01-22 PROCEDURE — 250N000013 HC RX MED GY IP 250 OP 250 PS 637: Performed by: STUDENT IN AN ORGANIZED HEALTH CARE EDUCATION/TRAINING PROGRAM

## 2024-01-22 PROCEDURE — 36415 COLL VENOUS BLD VENIPUNCTURE: CPT | Performed by: STUDENT IN AN ORGANIZED HEALTH CARE EDUCATION/TRAINING PROGRAM

## 2024-01-22 PROCEDURE — 120N000001 HC R&B MED SURG/OB

## 2024-01-22 PROCEDURE — 250N000013 HC RX MED GY IP 250 OP 250 PS 637: Performed by: INTERNAL MEDICINE

## 2024-01-22 PROCEDURE — 83735 ASSAY OF MAGNESIUM: CPT | Performed by: STUDENT IN AN ORGANIZED HEALTH CARE EDUCATION/TRAINING PROGRAM

## 2024-01-22 RX ORDER — HYDROMORPHONE HYDROCHLORIDE 2 MG/1
2 TABLET ORAL EVERY 6 HOURS PRN
Status: DISCONTINUED | OUTPATIENT
Start: 2024-01-22 | End: 2024-01-25

## 2024-01-22 RX ORDER — SODIUM CHLORIDE 9 MG/ML
INJECTION, SOLUTION INTRAVENOUS CONTINUOUS
Status: DISCONTINUED | OUTPATIENT
Start: 2024-01-22 | End: 2024-01-26 | Stop reason: HOSPADM

## 2024-01-22 RX ORDER — HYDROMORPHONE HYDROCHLORIDE 2 MG/1
2 TABLET ORAL EVERY 6 HOURS PRN
Status: DISCONTINUED | OUTPATIENT
Start: 2024-01-22 | End: 2024-01-22

## 2024-01-22 RX ORDER — POTASSIUM CHLORIDE 1500 MG/1
40 TABLET, EXTENDED RELEASE ORAL ONCE
Qty: 2 TABLET | Refills: 0 | Status: COMPLETED | OUTPATIENT
Start: 2024-01-22 | End: 2024-01-22

## 2024-01-22 RX ADMIN — PIPERACILLIN AND TAZOBACTAM 3.38 G: 3; .375 INJECTION, POWDER, FOR SOLUTION INTRAVENOUS at 05:05

## 2024-01-22 RX ADMIN — PIPERACILLIN AND TAZOBACTAM 3.38 G: 3; .375 INJECTION, POWDER, FOR SOLUTION INTRAVENOUS at 11:11

## 2024-01-22 RX ADMIN — METOPROLOL TARTRATE 25 MG: 25 TABLET, FILM COATED ORAL at 09:00

## 2024-01-22 RX ADMIN — PANTOPRAZOLE SODIUM 40 MG: 40 INJECTION, POWDER, FOR SOLUTION INTRAVENOUS at 08:59

## 2024-01-22 RX ADMIN — SODIUM CHLORIDE: 9 INJECTION, SOLUTION INTRAVENOUS at 11:08

## 2024-01-22 RX ADMIN — LIDOCAINE: 50 OINTMENT TOPICAL at 08:59

## 2024-01-22 RX ADMIN — METOPROLOL TARTRATE 25 MG: 25 TABLET, FILM COATED ORAL at 20:57

## 2024-01-22 RX ADMIN — PIPERACILLIN AND TAZOBACTAM 3.38 G: 3; .375 INJECTION, POWDER, FOR SOLUTION INTRAVENOUS at 22:57

## 2024-01-22 RX ADMIN — PIPERACILLIN AND TAZOBACTAM 3.38 G: 3; .375 INJECTION, POWDER, FOR SOLUTION INTRAVENOUS at 16:59

## 2024-01-22 RX ADMIN — METHOCARBAMOL 500 MG: 500 TABLET ORAL at 17:00

## 2024-01-22 RX ADMIN — LIDOCAINE: 50 OINTMENT TOPICAL at 16:56

## 2024-01-22 RX ADMIN — HYDROMORPHONE HYDROCHLORIDE 2 MG: 2 TABLET ORAL at 11:22

## 2024-01-22 RX ADMIN — LIDOCAINE: 50 OINTMENT TOPICAL at 20:57

## 2024-01-22 RX ADMIN — POTASSIUM CHLORIDE 40 MEQ: 1500 TABLET, EXTENDED RELEASE ORAL at 09:00

## 2024-01-22 RX ADMIN — PANTOPRAZOLE SODIUM 40 MG: 40 INJECTION, POWDER, FOR SOLUTION INTRAVENOUS at 20:56

## 2024-01-22 RX ADMIN — GABAPENTIN 200 MG: 100 CAPSULE ORAL at 09:00

## 2024-01-22 RX ADMIN — HYDROMORPHONE HYDROCHLORIDE 2 MG: 2 TABLET ORAL at 22:57

## 2024-01-22 ASSESSMENT — ACTIVITIES OF DAILY LIVING (ADL)
ADLS_ACUITY_SCORE: 22

## 2024-01-22 NOTE — PROGRESS NOTES
[facility] Progress Note     Interval History:  Passing gas. Had a large bowel movement this morning. He doesn't feel like distention is worse. Voices disappointment about going back on clear liquids. C/o severe back pain. He is trying to decrease opioid use, but wasn't able to sleep well. Ambulating floor.     Physical Exam:    /79 (BP Location: Left arm)   Pulse 95   Temp 99  F (37.2  C) (Oral)   Resp 18   Ht 1.829 m (6')   Wt 117.8 kg (259 lb 11.2 oz)   SpO2 96%   BMI 35.22 kg/m    Temp (24hrs), Av.5  F (37.5  C), Min:98.8  F (37.1  C), Max:100.2  F (37.9  C)    No data found.    Intake/Output Summary (Last 24 hours) at 2024 1126  Last data filed at 2024 1530  Gross per 24 hour   Intake 270 ml   Output --   Net 270 ml       Constitutional: No acute distress  Cardiovascular: RRR, normal S1/S2, no murmurs/edema  Respiratory: Effort normal, CTA bilaterally  Abdomen: +distended, high pitched BS on left side, tenderness on left side     Laboratory Data  Recent Labs   Lab Test 24  0645 24  0648 24  0549   WBC 21.0* 22.6* 26.9*   HGB 8.1* 8.2* 8.6*   MCV 86 87 86   * 444 419     Recent Labs   Lab Test 24  0645 24  0648 24  0549    130* 132*   POTASSIUM 3.2* 3.5 3.5   CHLORIDE 102 99 99   CO2 23 21* 23   BUN 50.8* 55.0* 55.6*   CR 3.60* 3.59* 3.68*   ANIONGAP 10 10 10   LUBNA 7.8* 7.5* 7.5*     Recent Labs   Lab Test 24  0645 24  1402 24  0556 24  0525 24  0528 01/15/24  0548 24  0546 24  1333   ALBUMIN 2.7*  --  2.6* 2.8*   < > 2.8*  2.8*   < > 3.4*   BILITOTAL 0.9  --  1.2 1.5*   < > 2.2*   < > 6.1*   DBIL 0.48*  --   --   --   --  1.52*  --   --    ALT 36  --  43 50   < > 71*   < > 112*   AST 63*  --  51* 60*   < > 94*   < > 133*   ALKPHOS 87  --  90 78   < > 73   < > 106   PROTEIN  --  30*  --   --   --   --   --   --    LIPASE  --   --   --   --   --   --   --  776*    < > = values in this interval not  displayed.       Imaging  EXAM: CT ABDOMEN PELVIS W/O CONTRAST  LOCATION: [facility]  DATE: 1/13/2024    IMPRESSION:   1.  Acute pancreatitis with extensive peripancreatic inflammation as described. No organized peripancreatic fluid collections. This noncontrast exam cannot evaluate for parenchymal necrosis.  2.  Cholelithiasis. No biliary ductal dilation.  3.  Reactive wall thickening of the duodenum related to the pancreatitis.  4.  Small amount of ascites.  5.  Trace left pleural effusion with adjacent atelectasis.    EXAM: US ABDOMEN LIMITED  LOCATION: [facility]  DATE: 1/13/2024  IMPRESSION:  1.  Cholelithiasis.  2.  Mildly heterogeneous liver with mild hepatic steatosis.  3.  Small ascites.  4.  Known pancreatitis was better assessed on the CT earlier today.      EXAM: MR ABDOMEN MRCP W/O CONTRAST  LOCATION: [facility]  DATE: 1/13/2024  IMPRESSION:     1.  At least 2 choledocholiths within the distal common bile duct measuring up to 3 mm with mild upstream biliary ductal dilation.  2.  Acute hemorrhagic pancreatitis with extensive peripancreatic inflammation and fluid as described. No organized peripancreatic fluid collections. No pancreatic ductal dilation. This noncontrast exam cannot evaluate for parenchymal necrosis.  3.  Cholelithiasis, without evidence of acute cholecystitis.  4.  Reactive edema within the proximal duodenum related to the pancreatitis. A mildly dilated left upper quadrant jejunal loop is also likely a focal ileus related to the pancreatitis.  5.  Small amount of ascites.  6.  Trace left pleural effusion with adjacent atelectasis.    CT ABDOMEN/PELVIS WITHOUT CONTRAST January 17, 2024 7:52 AM   IMPRESSION:   1.  Nondiagnostic evaluation for pancreatic necrosis given noncontrast  technique.  2.  Since 1/13/2024, overall similar extensive acute hemorrhagic  pancreatitis with surrounding ill-defined fluid and  pockets of fluid  along the left upper quadrant/stomach greater curvature. No internal  gas to suggest superinfection.  3.  Cholelithiasis with probable stone in the cystic duct. Common bile  duct stent without significant biliary ductal dilatation.  4.  Pancreatic duct stent has migrated, now within the ascending  colon.  5.  Trace left pleural effusion with adjacent atelectasis. Increased  body wall edema.    EXAM: XR ABDOMEN 2 VIEWS  LOCATION: [facility]  DATE: 01/21/2024     INDICATION: Distended abdomen, recent pancreatitis.  COMPARISON: None                                                                   IMPRESSION: Multiple gas dilated small and large bowel loops suggestive of a diffuse ileus. A biliary stent is noted. No free air identified.    Endoscopic Workup  ERCP 1/14/24  Findings:       The  film was normal. There was extensive edema and erosive        gastropathy present. There is also severe edema in the duodenum with        multiple ulcerations and erosions. Was very difficult to find the        papilla. Eventually it was located. The pancreatic duct was initially        cannulated. A wire was placed. The pancreatogram appeared normal.        Further trials to cannulate the bile duct with a double wire technique        were unsuccessful. At this point, a 5 [place] by 3 cm pancreatic stent        without internal flange was then placed into the pancreatic duct. A        needle-knife was then used to precut into the bile duct. A wire was        advanced. The cholangiogram revealed a 9 mm bile duct without apparent        filling defects. Balloon sweeps produced a few stone fragments but was        otherwise clear. A 10 [place] by 7 cm plastic biliary stent was then        placed. Excellent drainage was achieved.                                                                                    Impression:               - Choledocholithiasis was found. Complete removal                              was accomplished by biliary sphincterotomy. Biliary                             stent placed given the severe edema.                             - Pancreatic stent placed.                             - Severe erosive gastropathy and duodenopathy.   Recommendation:           - [facility].                             - Continue NPO.                             - IV PPI q 12 hours.                             - ABD x-ray in 10 days to eval for retained PD                             stent. If still present, EGD to remove.                             - ERCP in 4-6 weeks to remove biliary stent. This                             could be removed at the time of the PD stent                             removal if pt is doing well clinically.     Assessment & Plan:  48-year-old male with pancreatitis secondary to gallstones status post ERCP 1/14/24 with sphincterotomy, stent placement.  Course notable for leukocytosis -improving, ileus.     Impression & Plan:   Pancreatitis.  Pain continues to improve.  Tolerating oral intake.  Plan for eventual cholecystectomy as an outpatient for surgery.  -Recommend XR on day #10 (1/24 to eval for retained PD stent). If still present, EGD to remove  -ERCP 4-6 weeks from initial procedure to remove biliary stent  Ileus.  Abdomen more distended yesterday. XR showed ileus. Went back on clears  ----Clear liquids  ----Ambulate floor at least 4x a day  ----Try to decrease narcotics as able   3.  Erosive gastropathy and duodenopathy  ----Continue PPI BID    Will discuss with Dr. Evans. Please call Dr. Evans after 12PM if there are any questions.     Delmy Stewart, CNP  [facility]  Cell: [phone] until 12PM  Office: [phone]

## 2024-01-22 NOTE — PROGRESS NOTES
Date & Time: 0700-1930  Surgery/POD#: Gallstone Pancreatitis   Behavior & Aggression: Green  Fall Risk: No  Orientation:x4  ABNL VS/O2:Room air  ABNL Labs: See chart  Pain Management:PO Hydromorphone 2mg  Bowel/Bladder: Continent  Drains: PIV  Diet:Low fat  Activity Level: Ind  Tests/Procedures: NA  Anticipated  DC Date: Pending   Significant Information: Distended abdomen, but passing gas and had one BM this shift per patient. Lactide fired, labs within nl. Temp max 99.9. Monitor vitals

## 2024-01-22 NOTE — PROVIDER NOTIFICATION
MD Notification    Notified Person: MD    Notified Person Name: Dr. Carrillo (GI)    Notification Date/Time: January 21, 2024 2040    Notification Interaction: TORB    Purpose of Notification: Dr. Chamberlain )GI) changed pts diet to NPO after reading AXR results (shows ileus). Pt upset he can't even have ice ships.    Orders Received: MD states it is ok to change pts diet to clears.    Comments: Will let pt have minimal ice chips at first and see how he does.

## 2024-01-22 NOTE — PROGRESS NOTES
Renal Medicine Progress Note            Assessment/Plan:     48 y.o man with severe hemorrhagic pancreatitis due to biliary due to biliary stone.     # Severe acute kidney injury: Slow to improve. Same for several days. On-going abdominal distention and intra-abdominal hypertension is slowing kidney function recovery.     # Severe hemorrhagic pancreatitis:    -per GI    # Ileus: He had a large BM today and bowel sound is active.     # FEN: ++ peripheral edema. Hypokalemia-received KCL    # Hypertension:    -metoprolol     Plan:  # Stop NS infusion  # Stop Neurontin  # tract I/O  # renal panel in aM          Interval History:     Afebrile. VSS.   He is back on clear liquid due to worsening abdominal distention.   He had good BM this morning.  Bowel sound is active.   Denies vomiting.   He says he is urinating-not recorded.  Scr is about the same for several days.   On Zosyn.            Medications and Allergies:      gabapentin  200 mg Oral TID    lidocaine   Topical TID    metoprolol tartrate  25 mg Oral BID    nicotine  1 patch Transdermal Daily    nicotine   Transdermal Q8H    pantoprazole  40 mg Intravenous BID    piperacillin-tazobactam  3.375 g Intravenous Q6H    sodium chloride (PF)  3 mL Intracatheter Q8H        Allergies   Allergen Reactions    Peanut-Containing Drug Products     Tree Nuts [Nuts]             Physical Exam:   Vitals were reviewed   , Blood pressure 133/79, pulse 95, temperature 99  F (37.2  C), temperature source Oral, resp. rate 16, height 1.829 m (6'), weight 117.8 kg (259 lb 11.2 oz), SpO2 96%.    Wt Readings from Last 3 Encounters:   01/18/24 117.8 kg (259 lb 11.2 oz)       Intake/Output Summary (Last 24 hours) at 1/22/2024 1445  Last data filed at 1/22/2024 1400  Gross per 24 hour   Intake 870 ml   Output --   Net 870 ml       GENERAL APPEARANCE: NAD  HEENT:  Eyes/ears/nose/neck grossly normal  RESP: lungs cta b c good efforts, no crackles, rhonchi or wheezes  CV: RRR, nl  S1/S2,  ABDOMEN: pretty distended, semi firm, active bs  EXTREMITIES/SKIN: no rashes/lesions on observed skin; ++ edema  NEURO: Awake, alert and conversing normally.         Data:     CBC RESULTS:     Recent Labs   Lab 01/22/24  0645 01/21/24  0648 01/20/24  0549 01/19/24  0538 01/18/24  0556 01/17/24  0525   WBC 21.0* 22.6* 26.9* 29.2* 29.1* 27.0*   RBC 2.73* 2.75* 2.91* 2.97* 3.14* 3.15*   HGB 8.1* 8.2* 8.6* 8.7* 9.3* 9.3*   HCT 23.5* 23.8* 25.1* 25.2* 26.7* 26.7*   * 444 419 389 388 326       Basic Metabolic Panel:  Recent Labs   Lab 01/22/24  0645 01/21/24  0648 01/20/24  0549 01/19/24  0538 01/18/24  1409 01/18/24  0556 01/17/24  0525    130* 132* 130*  --  130* 128*   POTASSIUM 3.2* 3.5 3.5 3.4 3.5 3.3* 3.6   CHLORIDE 102 99 99 96*  --  93* 93*   CO2 23 21* 23 23  --  23 22   BUN 50.8* 55.0* 55.6* 62.7*  --  70.4* 82.0*   CR 3.60* 3.59* 3.68* 3.74*  --  4.01* 4.30*   * 126* 116* 164*  --  142* 103*   LUBNA 7.8* 7.5* 7.5* 7.4*  --  7.5* 7.3*       INRNo lab results found in last 7 days.   Attestation:   I have reviewed today's relevant vital signs, notes, medications, labs and imaging.    Kirill Verduzco MD  [facility] - [facility]rology  Office phone :[phone]  Pager: [phone]

## 2024-01-22 NOTE — PROGRESS NOTES
[facility]    Medicine Progress Note - Hospitalist Service    Date of Admission:  1/13/2024    Assessment & Plan   Pt is 28-year-old male with no significant past medical history who presented with 2-day history of abdominal bloating found to have acute gallstone pancreatitis.     Acute hemorrhagic biliary pancreatitis.  Cholelithiasis  Choledocholithiasis with 3 cm CBD dilatation.  Elevated LFTs.  CT abdomen extensive signs of acute pancreatitis, no choledocholithiasis noted but Cholelithiasis without cholecystitis.   -MRCP completed with choledocholithiasis with distal common bile duct measuring up to 3 mm with mild upstream biliary ductal dilatation.  Acute hemorrhagic pancreatitis with extensive peripancreatic inflammation and fluid as described, cholelithiasis without evidence of acute cholecystitis.  Reactive edema within the proximal duodenum related to pancreatitis>ERCP-1/14 choledocholithiasis was found with complete removal with biliary sphincterotomy and biliary stent placed given severe edema.  Pancreatic stent placed.  Severe erosive gastropathy and duodenopathy was also found incidentally    - GI following and given distention 01/21 they ordered XR which demonstrated suspected ileus. Now back to CLD today and patient aggravated. He denies nausea and reports having daily diarrhea. He wants to eat more  - will reduce narcotic usage and encourage ambulation, patient aware  - will need follow up with surgery after discharge to discuss timing of lap jim. They have signed off  - restart IVF    Possible bacteremia  Fever  Leukocytosis  - Leukocytosis since admission had been slowly down trending off antibiotics.   - fever developed 01/21. Blood cultures and urine checked. 1/2 bottles positive for gram pos cocci in clusters. Continue zosyn but check MRSA swab  - obtain doppler to rule out DVT for other etiology of fever     Non oliguric Acute kidney  injury  ATN  Hyponatremia  Nonanion gap metabolic acidosis, likely secondary to BRENDA  creatinine of 2.5 and sodium of 122 at presentation. No previous labs to compare    - Cr peaked at 4.36  - Cr ~3.6 without much change in the last 3 days  - UOP has been adequate however since hollingsworth removed need better documentation  - nephrology following but did not see over the weekend, appreciate input today. question if increased distention/pressure could now be contributing to ongoing BRENDA. No previous labs to check prior to admission baseline  - restart IVF    Hypocalcemia  - low normal when corrected for albumin  - monitor    Acute Anemia unknown etiology  Erosive gastropathy and duodenopathy  Unknown baseline  however on admission Hb 14    - gradual decline since admission. No active bleeding identified  - During ERCP patient was noted to have severe erosive gastropathy and duodenopathy as above and started on Protonix IV twice daily  - s/p venofer per nephrology        Diet: Snacks/Supplements Adult: Ensure Clear; With Meals  Clear Liquid Diet    DVT Prophylaxis: Pneumatic Compression Devices  Hollingsworth Catheter: Not present  Lines: None     Cardiac Monitoring: None  Code Status: Full Code      Clinically Significant Risk Factors        # Hypokalemia: Lowest K = 3.2 mmol/L in last 2 days, will replace as needed       # Hypoalbuminemia: Lowest albumin = 2.6 g/dL at 1/18/2024  5:56 AM, will monitor as appropriate              # Obesity: Estimated body mass index is 35.22 kg/m  as calculated from the following:    Height as of this encounter: 1.829 m (6').    Weight as of this encounter: 117.8 kg (259 lb 11.2 oz).             Disposition Plan      Expected Discharge Date: 01/25/2024,  3:00 PM    Destination: home with family  Discharge Comments: pending diet/SIS Calvo DO  Hospitalist Service  [facility]  Securely message with [employer] (more info)  Text page via [employer] Paging/Directory    ______________________________________________________________________    Interval History   Patient upset about the diet change. He denies any real change in his symptoms. He wants he wants to eat something. No fever or chills. He ongoing diarrhea. No nausea and pain is about the same in his back. No leg swelling. Active listening provided. Nurse at bedside and we educated about plans forward.     Physical Exam   Vital Signs: Temp: 99  F (37.2  C) Temp src: Oral BP: (!) 152/83 Pulse: 96   Resp: 16 SpO2: 95 % O2 Device: None (Room air)    Weight: 259 lbs 11.2 oz    Exam:  Constitutional: Awake, alert and no distress. Appears comfortable  Cardiovascular: RRR.  no murmurs, no rubs or JVD  Respiratory:normal WOB,b/l equal air entry, no wheezes or crackles   Gastrointestinal: Abdomen distended, hypoactive bowel sounds remain, no guarding  Extremities :no edema , no clubbing or cyanosis      Medical Decision Making       44 MINUTES SPENT BY ME on the date of service doing chart review, history, exam, documentation & further activities per the note.      Data     I have personally reviewed the following data over the past 24 hrs:    21.0 (H)  \   8.1 (L)   / 501 (H)     135 102 50.8 (H) /  118 (H)   3.2 (L) 23 3.60 (H) \     ALT: 36 AST: 63 (H) AP: 87 TBILI: 0.9   ALB: 2.7 (L) TOT PROTEIN: 5.6 (L) LIPASE: N/A     Procal: N/A CRP: N/A Lactic Acid: 1.0         Imaging results reviewed over the past 24 hrs:   No results found for this or any previous visit (from the past 24 hour(s)).      Recent Labs   Lab 01/22/24  0645 01/21/24  0648 01/20/24  0549 01/18/24  1409 01/18/24  0556   WBC 21.0* 22.6* 26.9*   < > 29.1*   HGB 8.1* 8.2* 8.6*   < > 9.3*   MCV 86 87 86   < > 85   * 444 419   < > 388    130* 132*   < > 130*   POTASSIUM 3.2* 3.5 3.5   < > 3.3*   CHLORIDE 102 99 99   < > 93*   CO2 23 21* 23   < > 23   BUN 50.8* 55.0* 55.6*   < > 70.4*   CR 3.60* 3.59* 3.68*   < > 4.01*   ANIONGAP 10 10 10   < > 14   LUBNA  7.8* 7.5* 7.5*   < > 7.5*   * 126* 116*   < > 142*   ALBUMIN 2.7*  --   --   --  2.6*   PROTTOTAL 5.6*  --   --   --  5.4*   BILITOTAL 0.9  --   --   --  1.2   ALKPHOS 87  --   --   --  90   ALT 36  --   --   --  43   AST 63*  --   --   --  51*    < > = values in this interval not displayed.

## 2024-01-22 NOTE — PLAN OF CARE
Date & Time: 1900-0730.  Surgery/POD#: here w/ pancreatitis due to gallstones.  Behavior & Aggression: Green - no concerns.  Fall Risk: No.  Orientation:A&Ox4.  ABNL VS/O2: VSS on RA.  ABNL Labs: see chart.  Pain Management: PRN Dilaudid.  Bowel/Bladder: Continent of B/B, active bowel sounds, no BM, passing gas per pt.  IV/Drains/Skin: PIV SL. Skin is jaundice, noted pretty distended abdomen.   Diet:was NPO due to ileus but pt got agitated/frustrated, on-call GI doctor - okay'd to take in clears as tolerated.  Activity Level: IND.  Tests/Procedures: N/A.  Anticipated  DC Date: TBD.

## 2024-01-23 LAB
ANION GAP SERPL CALCULATED.3IONS-SCNC: 9 MMOL/L (ref 7–15)
BUN SERPL-MCNC: 41.6 MG/DL (ref 6–20)
CALCIUM SERPL-MCNC: 8 MG/DL (ref 8.6–10)
CHLORIDE SERPL-SCNC: 102 MMOL/L (ref 98–107)
CREAT SERPL-MCNC: 3.29 MG/DL (ref 0.67–1.17)
DEPRECATED HCO3 PLAS-SCNC: 24 MMOL/L (ref 22–29)
EGFRCR SERPLBLD CKD-EPI 2021: 23 ML/MIN/1.73M2
GLUCOSE SERPL-MCNC: 108 MG/DL (ref 70–99)
MAGNESIUM SERPL-MCNC: 2.1 MG/DL (ref 1.7–2.3)
POTASSIUM SERPL-SCNC: 3.2 MMOL/L (ref 3.4–5.3)
POTASSIUM SERPL-SCNC: 3.2 MMOL/L (ref 3.4–5.3)
POTASSIUM SERPL-SCNC: 3.3 MMOL/L (ref 3.4–5.3)
SODIUM SERPL-SCNC: 135 MMOL/L (ref 135–145)

## 2024-01-23 PROCEDURE — 84132 ASSAY OF SERUM POTASSIUM: CPT | Performed by: HOSPITALIST

## 2024-01-23 PROCEDURE — 250N000013 HC RX MED GY IP 250 OP 250 PS 637: Performed by: HOSPITALIST

## 2024-01-23 PROCEDURE — C9113 INJ PANTOPRAZOLE SODIUM, VIA: HCPCS | Performed by: INTERNAL MEDICINE

## 2024-01-23 PROCEDURE — 36415 COLL VENOUS BLD VENIPUNCTURE: CPT | Performed by: STUDENT IN AN ORGANIZED HEALTH CARE EDUCATION/TRAINING PROGRAM

## 2024-01-23 PROCEDURE — 99207 PR APP CREDIT; MD BILLING SHARED VISIT: CPT | Performed by: NURSE PRACTITIONER

## 2024-01-23 PROCEDURE — 99233 SBSQ HOSP IP/OBS HIGH 50: CPT | Performed by: HOSPITALIST

## 2024-01-23 PROCEDURE — 36415 COLL VENOUS BLD VENIPUNCTURE: CPT | Performed by: HOSPITALIST

## 2024-01-23 PROCEDURE — 120N000001 HC R&B MED SURG/OB

## 2024-01-23 PROCEDURE — 250N000011 HC RX IP 250 OP 636: Performed by: INTERNAL MEDICINE

## 2024-01-23 PROCEDURE — 250N000013 HC RX MED GY IP 250 OP 250 PS 637: Performed by: PAIN MEDICINE

## 2024-01-23 PROCEDURE — 250N000013 HC RX MED GY IP 250 OP 250 PS 637: Performed by: STUDENT IN AN ORGANIZED HEALTH CARE EDUCATION/TRAINING PROGRAM

## 2024-01-23 PROCEDURE — 250N000013 HC RX MED GY IP 250 OP 250 PS 637: Performed by: INTERNAL MEDICINE

## 2024-01-23 PROCEDURE — 250N000011 HC RX IP 250 OP 636: Performed by: STUDENT IN AN ORGANIZED HEALTH CARE EDUCATION/TRAINING PROGRAM

## 2024-01-23 PROCEDURE — 80048 BASIC METABOLIC PNL TOTAL CA: CPT | Performed by: STUDENT IN AN ORGANIZED HEALTH CARE EDUCATION/TRAINING PROGRAM

## 2024-01-23 RX ORDER — POTASSIUM CHLORIDE 1500 MG/1
40 TABLET, EXTENDED RELEASE ORAL ONCE
Status: COMPLETED | OUTPATIENT
Start: 2024-01-23 | End: 2024-01-23

## 2024-01-23 RX ORDER — GABAPENTIN 100 MG/1
100 CAPSULE ORAL 2 TIMES DAILY
Status: DISCONTINUED | OUTPATIENT
Start: 2024-01-23 | End: 2024-01-26 | Stop reason: HOSPADM

## 2024-01-23 RX ADMIN — PIPERACILLIN AND TAZOBACTAM 3.38 G: 3; .375 INJECTION, POWDER, FOR SOLUTION INTRAVENOUS at 23:51

## 2024-01-23 RX ADMIN — METHOCARBAMOL 500 MG: 500 TABLET ORAL at 23:51

## 2024-01-23 RX ADMIN — PIPERACILLIN AND TAZOBACTAM 3.38 G: 3; .375 INJECTION, POWDER, FOR SOLUTION INTRAVENOUS at 11:27

## 2024-01-23 RX ADMIN — METHOCARBAMOL 500 MG: 500 TABLET ORAL at 10:11

## 2024-01-23 RX ADMIN — METOPROLOL TARTRATE 25 MG: 25 TABLET, FILM COATED ORAL at 10:05

## 2024-01-23 RX ADMIN — PIPERACILLIN AND TAZOBACTAM 3.38 G: 3; .375 INJECTION, POWDER, FOR SOLUTION INTRAVENOUS at 16:31

## 2024-01-23 RX ADMIN — POTASSIUM CHLORIDE 40 MEQ: 1500 TABLET, EXTENDED RELEASE ORAL at 20:12

## 2024-01-23 RX ADMIN — METOPROLOL TARTRATE 25 MG: 25 TABLET, FILM COATED ORAL at 20:12

## 2024-01-23 RX ADMIN — LIDOCAINE: 50 OINTMENT TOPICAL at 10:05

## 2024-01-23 RX ADMIN — PANTOPRAZOLE SODIUM 40 MG: 40 INJECTION, POWDER, FOR SOLUTION INTRAVENOUS at 20:14

## 2024-01-23 RX ADMIN — PIPERACILLIN AND TAZOBACTAM 3.38 G: 3; .375 INJECTION, POWDER, FOR SOLUTION INTRAVENOUS at 05:27

## 2024-01-23 RX ADMIN — LIDOCAINE: 50 OINTMENT TOPICAL at 23:52

## 2024-01-23 RX ADMIN — POTASSIUM CHLORIDE 40 MEQ: 1500 TABLET, EXTENDED RELEASE ORAL at 13:59

## 2024-01-23 RX ADMIN — HYDROMORPHONE HYDROCHLORIDE 2 MG: 2 TABLET ORAL at 20:12

## 2024-01-23 RX ADMIN — METHOCARBAMOL 500 MG: 500 TABLET ORAL at 02:51

## 2024-01-23 RX ADMIN — PANTOPRAZOLE SODIUM 40 MG: 40 INJECTION, POWDER, FOR SOLUTION INTRAVENOUS at 10:05

## 2024-01-23 RX ADMIN — LIDOCAINE: 50 OINTMENT TOPICAL at 16:37

## 2024-01-23 RX ADMIN — HYDROMORPHONE HYDROCHLORIDE 2 MG: 2 TABLET ORAL at 12:14

## 2024-01-23 ASSESSMENT — ACTIVITIES OF DAILY LIVING (ADL)
ADLS_ACUITY_SCORE: 22

## 2024-01-23 NOTE — PLAN OF CARE
Date & Time: 1/22/24 0700-1930  Summary: Acute gallstone pancreatitis  Behavior & Aggression: Green  Fall Risk: No  Orientation:A&Ox4  ABNL VS/O2:VSS on RA, elevated /83  ABNL Labs: K+ 3.2 replaced, recheck sched.  Pain Management:PRN Dilaudid and Lidocaine cream for back pain  Bowel/Bladder: Up to BR  Drains: PIV SL with intermittent abx  Diet:Clear liq  Activity Level: Ind  Tests/Procedures: US of BLE  Anticipated  DC Date: Pending  Significant Information: Neph/GI following

## 2024-01-23 NOTE — PROGRESS NOTES
[facility]    Medicine Progress Note - Hospitalist Service    Date of Admission:  1/13/2024    Assessment & Plan   Pt is 28-year-old male with no significant past medical history who presented with 2-day history of abdominal bloating found to have acute gallstone pancreatitis.     Acute hemorrhagic biliary pancreatitis  Cholelithiasis  Choledocholithiasis with 3 cm CBD dilatation  Elevated LFTs  Ileus  CT abdomen extensive signs of acute pancreatitis, no choledocholithiasis noted but cholelithiasis noted without cholecystitis.   MRCP on 1/13/24 showed choledocholithiasis with distal common bile duct measuring up to 3 mm with mild upstream biliary ductal dilatation.  Acute hemorrhagic pancreatitis with extensive peripancreatic inflammation and fluid as described, cholelithiasis without evidence of acute cholecystitis.  Reactive edema within the proximal duodenum related to pancreatitis.  Admitted to inpatient.  [facility] consulted, appreciate their assistance.  S/p ERCP on 1/14 which showed: choledocholithiasis was found with complete removal with biliary sphincterotomy and biliary stent placed given severe edema; pancreatic stent placed; severe erosive gastropathy and duodenopathy were also found incidentally.  Initially NPO and given IV fluids. Diet subsequently slowly advanced, however he developed abdominal distention on 1/21/24.  Abdominal x-ray on 1/21/24 showed findings consistent with diffuse ileus.  Diet downgraded to clear liquids on 1/21/24.  Large BM on 1/22/24 and another BM on 1/23/24. Abdomen still distended on 1/23/24, but starting to improve.  Reduce narcotic usage as able.  Encourage ambulation.  IV fluids as directed by nephrology.  General surgery consulted during this admission, appreciate their assistance. Signed off on 1/19/24, recommend outpatient follow up in one month, consider elective outpatient cholecystectomy after he recovers from his current  illness.  Continue inpatient care for now pending improvement in ileus, ability to advance diet, improvement in renal function, consultant recommendations.    Positive blood culture  Fever  Leukocytosis  Leukocytosis noted upon admission, trended down initially, then trended up. Peaked at 29.1 on 1/18/24.  Fever started on 1/21/24, up to 100.9.  Blood cultures on 1/21/24 with 2/4 bottles positive for Staph epidermidis.  Suspect this is a contaminant.   Has been on empiric Zosyn, will continue for now.  Repeat blood cultures in AM.  MRSA nasal swab negative for MRSA.  Bilateral lower extremity Doppler ultrasound negative on 1/22/24 - ordered to exclude DVT as etiology of fever.     Acute kidney injury, non-oliguric  ATN  Hyponatremia  Nonanion gap metabolic acidosis, likely secondary to BRENDA  Creatinine of 2.5 and sodium of 122 at presentation. No previous labs to compare.  Nephrology consulted, appreciate their assistance.  Pancreatitis treated as noted above including IV fluids.  Sodium slowly trended up, within normal limits as of 1/22/24.  Creatinine trend up and peaked at 4.36 on 1/16/24. Creatinine slowly trending down since then. Creatinine 3.29 on 1/23/24.  Urine output has been adequate, however since hollingsworth removed need better documentation.  Ileus and increased intra-abdominal pressure likely contributing to slow recovery.  IV fluids discontinued on 1/23/24 per nephrology.  Gabapentin placed on hold by nephrology on 1/22/24.  Recheck labs in AM.    Hypocalcemia  Low normal when corrected for albumin.  Monitor.    Acute Anemia unknown etiology  Erosive gastropathy and duodenopathy  Unknown baseline, however on admission hemoglobin 14.3. Hemoglobin down to 11.8 on 1/14/24, then slowly trending down. Iron levels low on 1/20/24, but ferritin significantly elevated.  Erosive gastropathy and duodenopathy noted during ERCP on 1/14/24, no active bleeding noted.  Continue BID protonix.  Inflammation and renal disease  likely contributing to anemia.  IV venofer given on 1/20 and 1/21 per nephrology.  Hemoglobin 8.1 on 1/22/24.  Recheck in AM.    Tobacco use  Briefly counseled on cessation.  Nicotine patch ordered.  PRN nicotrol spray available.          Diet: Snacks/Supplements Adult: Ensure Clear; With Meals  Full Liquid Diet    DVT Prophylaxis: Pneumatic Compression Devices  Cornejo Catheter: Not present  Lines: None     Cardiac Monitoring: None  Code Status: Full Code      Clinically Significant Risk Factors        # Hypokalemia: Lowest K = 3.2 mmol/L in last 2 days, will replace as needed       # Hypoalbuminemia: Lowest albumin = 2.6 g/dL at 1/18/2024  5:56 AM, will monitor as appropriate            # Obesity: Estimated body mass index is 35.22 kg/m  as calculated from the following:    Height as of this encounter: 1.829 m (6').    Weight as of this encounter: 117.8 kg (259 lb 11.2 oz).             Disposition Plan      Expected Discharge Date: 01/24/2024,  3:00 PM    Destination: home with family  Discharge Comments: pending diet            Keenan Soares MD  Hospitalist Service  [facility]  Securely message with Mobule (more info)  Text page via [facility] Paging/Directory   ______________________________________________________________________    Interval History   Talha Alexander was seen this afternoon.  He feels a little better today.  Had a large BM yesterday and another bowel movement today.  Abdomen still feels distended, but overall improved.  Difficulty taking a deep breath due to the abdominal distention.  Denies chest pain.  No fevers, chills, sweats.  Tolerating full liquids, feels hungry and hopes to be able to advance his diet further soon.  Some discomfort in his back, overall fairly well-controlled at the moment.    Physical Exam   Vital Signs: Temp: 99.2  F (37.3  C) Temp src: Oral BP: (!) 143/87 Pulse: 96   Resp: 16 SpO2: 96 % O2 Device: None (Room air)    Weight: 259 lbs 11.2  oz    Constitutional: awake, alert, cooperative, no apparent distress, laying in the hospital bed  Respiratory: no increased work of breathing, clear to auscultation bilaterally, no crackles or wheezing  Cardiovascular: regular rate and rhythm, normal S1 and S2, no murmur noted  GI: bowel sounds present, distended, non-tender  Skin: warm, dry  Musculoskeletal: 2+ lower extremity pitting edema present  Neurologic: awake, alert, oriented, answers questions appropriately, move all extremities    Medical Decision Making       40 MINUTES SPENT BY ME on the date of service doing chart review, history, exam, documentation & further activities per the note.      Data     I have personally reviewed the following data over the past 24 hrs:    N/A  \   N/A   / N/A     135 102 41.6 (H) /  108 (H)   3.2 (L) 24 3.29 (H) \

## 2024-01-23 NOTE — PROGRESS NOTES
"[facility] ACUTE PAIN SERVICE    [facility]   Daily PAIN Progress Note        [facility] Paging/Directory Pain  Securely message with the [employer] Web Console (learn more here)  (When I saw the patient): 01/23/24  Assessment/Plan:  Talha Alexander seeing in follow up for Assessment/Plan:  Talha Alexander seeing in follow up for  for acute pancreatitis s/p ECRP, complicated by pancreatic hemophage and acute kidney injury we are seeing him for acute abdominal pain with pain onset about 8 days ago .He reports he noted a \"yellowness to eyes and stools were gray. He he a healthy middle male with past medical history of obesity and tobacco dependence, snoring. He does snore and has no history of substance use disorder rarely drinks alcohol. His abdominal pain is significantly improved from yesterday. Labs are trending downward. He had had BM and urinating well, tolerating ice chips, skin color normal. Appreciate in put from general surgery, nephrology and gastroenterology. Counseled patient  on triggers that increase recurrence of pancreatitis. These include obesity, tobacco use and alcohol.  He was also encouraged to establish with a primary care provider and would also benefit from evaluation for sleep disordered breathing         Opioid Induced Respiratory Depression Risk Mitigation Assessment: moderate  (Low 0-1; Moderate 2-4; or High >4 or >/= 3 if two of the risk factors are age > 60 and opioid naive) due to the following risk factors: ROSA, COPD/Asthma/pulmonary disease, CHF, renal dysfunction, hepatic dysfunction, Obesity, Smoker, Age>60, >2 opioid therapies, concomitant CNS depressants, opioid naive status, or post surgical   ORT 1= low risk ?   [place] pulled from system no controlled substance in last 12 months.   Chronic opioid use = 0 mg MME, opioid used this past 24 hours in the form of oral  4 mg Hydromorphone oral and  IV.0 mg = 16 mg MME     PLAN:   1) Pain is consistent with visceral, secondary to " "acute pancreatitis , in the setting of obesity and tobacco dependence. Treatment plan includes multimodal pain approach, medications as listed below, reviewed.  Discharge medications, advised keeping track of doses, educated on tapering off as pain improves, watch for constipation and stool softeners, no driving or alcohol with opioids, store medications in a safe place, advised to take no more than the prescribed dose as increased doses may cause respiratory depression or death. Patient is understanding of the plan. All questions and concerns addressed to patient's satisfaction.      2)Multimodal Medication Therapy  Topical:  lidocaine ointment tid  Adjuvants: CrCl is 37.2  mg/dl\", \"Tylenol 650 mg prn \", Hydroxyzine  not indicated  Muscle relaxer's not indicated. \" Gabapentin on hold for now ( renal recovery and edema)   Antidepressants/anxiolytics: none      Opioids: Hydromorphone(Dilaudid) 2 mg every 6 hrs prn   IV Pain medication: Dilaudid discontinued.      3) Non-medication interventions- Ice, Heat, physical therapy, distraction aroma therapy   4) Interdisciplinary team care: Appreciate in put from general surgery, nephrology and gastroenterology.   5)Constipation Prophylaxis- senna and miralax. Continue to monitor.   6) Follow up   -acute pain team will continue to follow peripherally for next day if pain controlled will sign off.   -Opioid prescriber has been none. PCP is none      -Discharge Recommendations - We recommend prescribing the following at the time of discharge:    Dilaudid 2 mg every 4 hrs prn  #10 tablet(s)  Follow up with GI Nephrology   Establish with PCP tobacco cessation,weight loss sleep study evaluation   Disposition: home      Prescribing at discharge: hospitalist      Subjective:  Abdomen less distended. Renal slowly improving  Partial ileus   Patient  using opioids sparingly   No abdominal pain lumbar pain focal and bilateral   Had large BM making urine           BRENDA (acute kidney " injury) (H24)   Patient Active Problem List   Diagnosis    Acute pancreatitis    Calculus of gallbladder without cholecystitis    Hyponatremia    Gallstone pancreatitis    Elevated LFTs    BRENDA (acute kidney injury) (H24)    Acute biliary pancreatitis, unspecified complication status        History   Drug Use Not on file         Tobacco Use      Smoking status: None      Smokeless tobacco: None         [Held by provider] gabapentin  100 mg Oral BID    lidocaine   Topical TID    metoprolol tartrate  25 mg Oral BID    nicotine  1 patch Transdermal Daily    nicotine   Transdermal Q8H    pantoprazole  40 mg Intravenous BID    piperacillin-tazobactam  3.375 g Intravenous Q6H    potassium chloride  40 mEq Oral Once    sodium chloride (PF)  3 mL Intracatheter Q8H       Objective:  Vital signs in last 24 hours:  B/P: 143/87, T: 99.2, P: 96, R: 16   Blood pressure (!) 143/87, pulse 96, temperature 99.2  F (37.3  C), temperature source Oral, resp. rate 16, height 1.829 m (6'), weight 117.8 kg (259 lb 11.2 oz), SpO2 96%.      Weight:   Wt Readings from Last 3 Encounters:   01/18/24 117.8 kg (259 lb 11.2 oz)           Intake/Output:    Intake/Output Summary (Last 24 hours) at 1/23/2024 1240  Last data filed at 1/22/2024 2240  Gross per 24 hour   Intake 720 ml   Output --   Net 720 ml        Review of Systems:   As per subjective, all others negative.    Physical Exam:     General Appearance:  Alert, cooperative, no distress. Patient is pleasantly frustrated with prolong hospitalization grooming is good   Head:  Normocephalic, without obvious abnormality, atraumatic   Eyes:   Conjunctiva/corneas clear, EOM's intact   ENT/Throat: Lips, mouth moist    Lymph/Neck: Symmetrical, trachea midline, no adenopathy, thyroid: not enlarged, symmetric    Lungs:   Clear to auscultation bilaterally, respirations unlabored, even chest rise. Symmetrical movement    Chest Wall:  No tenderness or deformity   Cardiovascular/Heart:  Regular rate and  rhythm, S1, S2 normal,no murmur, rub or gallop.     Abdomen:   Soft, non-tender, bowel sounds active all four quadrants,  no masses, no organomegaly   Musculoskeletal: Extremities normal, atraumatic  Incision:none    Skin: Skin color good, lesions  none    Neurologic: Alert and oriented X 3, Moves all 4 extremities        Psych: Affect is good aberrant pain behaviors, No             Imaging:  Personally Reviewed.       Results for orders placed or performed during the hospital encounter of 01/13/24   US Abdomen Limited    Impression    IMPRESSION:  1.  Cholelithiasis.  2.  Mildly heterogeneous liver with mild hepatic steatosis.  3.  Small ascites.  4.  Known pancreatitis was better assessed on the CT earlier today.       CT Abdomen Pelvis w/o Contrast    Impression    IMPRESSION:     1.  Acute pancreatitis with extensive peripancreatic inflammation as described. No organized peripancreatic fluid collections. This noncontrast exam cannot evaluate for parenchymal necrosis.    2.  Cholelithiasis. No biliary ductal dilation.    3.  Reactive wall thickening of the duodenum related to the pancreatitis.    4.  Small amount of ascites.    5.  Trace left pleural effusion with adjacent atelectasis.   MR Abdomen MRCP without Contrast    Impression    IMPRESSION:    1.  At least 2 choledocholiths within the distal common bile duct measuring up to 3 mm with mild upstream biliary ductal dilation.    2.  Acute hemorrhagic pancreatitis with extensive peripancreatic inflammation and fluid as described. No organized peripancreatic fluid collections. No pancreatic ductal dilation. This noncontrast exam cannot evaluate for parenchymal necrosis.    3.  Cholelithiasis, without evidence of acute cholecystitis.    4.  Reactive edema within the proximal duodenum related to the pancreatitis. A mildly dilated left upper quadrant jejunal loop is also likely a focal ileus related to the pancreatitis.    5.  Small amount of ascites.    6.  Trace  left pleural effusion with adjacent atelectasis.   CT Abdomen Pelvis w/o Contrast    Impression    IMPRESSION:   1.  Nondiagnostic evaluation for pancreatic necrosis given noncontrast  technique.  2.  Since 1/13/2024, overall similar extensive acute hemorrhagic  pancreatitis with surrounding ill-defined fluid and pockets of fluid  along the left upper quadrant/stomach greater curvature. No internal  gas to suggest superinfection.  3.  Cholelithiasis with probable stone in the cystic duct. Common bile  duct stent without significant biliary ductal dilatation.  4.  Pancreatic duct stent has migrated, now within the ascending  colon.  5.  Trace left pleural effusion with adjacent atelectasis. Increased  body wall edema.    LING RODRIGUEZ MD         SYSTEM ID:  [id number]   XR Abdomen 2 Views    Impression    IMPRESSION: Multiple gas dilated small and large bowel loops suggestive of a diffuse ileus. A biliary stent is noted. No free air identified.            Lab Results:  Personally Reviewed.   Last Comprehensive Metabolic Panel:  Sodium   Date Value Ref Range Status   01/23/2024 135 135 - 145 mmol/L Final     Comment:     Reference intervals for this test were updated on 09/26/2023 to more accurately reflect our healthy population. There may be differences in the flagging of prior results with similar values performed with this method. Interpretation of those prior results can be made in the context of the updated reference intervals.      Potassium   Date Value Ref Range Status   01/23/2024 3.3 (L) 3.4 - 5.3 mmol/L Final     Chloride   Date Value Ref Range Status   01/23/2024 102 98 - 107 mmol/L Final     Carbon Dioxide (CO2)   Date Value Ref Range Status   01/23/2024 24 22 - 29 mmol/L Final     Anion Gap   Date Value Ref Range Status   01/23/2024 9 7 - 15 mmol/L Final     Glucose   Date Value Ref Range Status   01/23/2024 108 (H) 70 - 99 mg/dL Final     GLUCOSE BY METER POCT   Date Value Ref Range Status    01/16/2024 93 70 - 99 mg/dL Final     Urea Nitrogen   Date Value Ref Range Status   01/23/2024 41.6 (H) 6.0 - 20.0 mg/dL Final     Creatinine   Date Value Ref Range Status   01/23/2024 3.29 (H) 0.67 - 1.17 mg/dL Final     GFR Estimate   Date Value Ref Range Status   01/23/2024 23 (L) >60 mL/min/1.73m2 Final     Calcium   Date Value Ref Range Status   01/23/2024 8.0 (L) 8.6 - 10.0 mg/dL Final        UA:   Amphetamines Urine   Date Value Ref Range Status   01/14/2024 Screen Negative Screen Negative Final     Comment:     Cutoff for a negative amphetamine is less than 500 ng/mL.     Barbituates Urine   Date Value Ref Range Status   01/14/2024 Screen Negative Screen Negative Final     Comment:     Cutoff for a negative barbiturate is less than 200 ng/mL.     Cannabinoids Urine   Date Value Ref Range Status   01/14/2024 Screen Negative Screen Negative Final     Comment:     Cutoff for a negative cannabinoid is less than 50 ng/mL.     Cocaine Urine   Date Value Ref Range Status   01/14/2024 Screen Negative Screen Negative Final     Comment:     Cutoff for a negative cocaine is less than 300 ng/mL.     Opiates Urine   Date Value Ref Range Status   01/14/2024 Screen Positive (A) Screen Negative Final     Comment:     Cutoff for a positive opiate is 300 ng/mL or greater.  This is an unconfirmed screening result to be used for medical purposes only.     PCP Urine   Date Value Ref Range Status   01/14/2024 Screen Negative Screen Negative Final     Comment:     Cutoff for a negative PCP is less than 25 ng/mL.            Critical decision making elements:  Use of high risk medications: Yes, Ongoing monitoring for adverse effects of medications by me: Yes, Relevant labs, imaging, notes reviewed by me:  Yes  Please see A&P for additional details of medical decision making.  Educated on opioid use limit to 3- 4 day ( ~30 MME)   20 MINUTES SPENT BY ME on the date of service doing chart review, history, exam, documentation &  further activities per the note.  Communicated plan with hospitalist managing care? Yes  Communicated plan with bedside nurse?  Yes    Veronica Irizarry, MORENA CNP, PGMT-BC, ACHPN  [facility] Monday-Friday 8:00-4:30  No weekend coverage contact house officer  [facility] Paging/Directory Pain  Securely message with the Vocera Web Console (learn more here)

## 2024-01-23 NOTE — PLAN OF CARE
Goal Outcome Evaluation:      Plan of Care Reviewed With: patient    Overall Patient Progress: improvingOverall Patient Progress: improving    Outcome Evaluation: Pt feeling a bit better and anxious to get back to eating solids. Wants to change Ensure Clear to Ensure Enlive

## 2024-01-23 NOTE — PLAN OF CARE
Goal Outcome Evaluation:      Plan of Care Reviewed With: patient    Overall Patient Progress: improvingOverall Patient Progress: improving       Date & Time: January 23, 2024 2184-7286   Surgery/POD#: POD 8 ERCP Stent placement   Acute pancreatitis w/ gallstone  Behavior & Aggression: Green   Fall Risk: No   Orientation:A&Ox4   ABNL VS/O2:VSS on RA   ABNL Labs: See chart   Pain Management:PRN dilaudid and robaxin   Bowel/Bladder: Voiding adequately. BS active, passing flatus, multiple loose BM throughout shift    Drains: NA   Diet:Clear liquids   Activity Level: Ind   Tests/Procedures: NA   Anticipated  DC Date: Pending   Significant Information: PIV infusing intermittent abx.

## 2024-01-23 NOTE — PROGRESS NOTES
CLINICAL NUTRITION SERVICES - REASSESSMENT NOTE    Recommendations Ordered by Registered Dietitian (RD):   Change supplements from Ensure Clear to Ensure Enlive    Malnutrition: 1/23  % Weight Loss:  None noted  % Intake:  </= 50% for >/= 5 days (severe malnutrition)  Subcutaneous Fat Loss:  None observed  Muscle Loss:  None observed  Fluid Retention:  Mild 2+ (not nutrition related)     Malnutrition Diagnosis: Patient does not meet two of the above criteria necessary for diagnosing malnutrition     EVALUATION OF PROGRESS TOWARD GOALS   Diet: Full Liquid Diet (1/23)   Ensure Clear w/ meals TID    1/21 - Low Fat Diet --> NPO --> CLD  1/19 - Regular   1/18 - FLD  1/17 - CLD  1/13-1/16 - NPO    Intake/Tolerance:   - Pt seen in room. Feeling a bit better. Really sick of ensure clear, wants to change it to Ensure Enlive. Anxious to get back on a solid diet.     - Labs:   K 3.3 (L)  BUN 41.6 (H)  Cr 2.39 (H)  - Weight:    Date/Time Weight   01/18/24 0620 117.8 kg (259 lb 11.2 oz)   01/16/24 0500 114.2 kg (251 lb 11.2 oz)   01/13/24 1311 97.5 kg (215 lb)     - Stooling:  Multiple overnight per GI  1/22 - BM x1    ASSESSED NUTRITION NEEDS:  Dosing Weight 89.2 kg  Estimated Energy Needs: 9206-3571 kcals (20-25 Kcal/Kg)  Justification: maintenance R/t BMI  Estimated Protein Needs: 107-134 grams protein (1.2-1.5 g pro/Kg)  Justification: preservation of lean body mass  Estimated Fluid Needs: 1 mL/kcal or per provider pending fluid status    NEW FINDINGS:   AXR 1/21 with multiple gas dilated small and large bowel loops suggestive of diffuse ileus. GI following.     Previous Goals:   Patient to consume 4-5 small meals/supplements per day   Evaluation: Not met consistently due to interruptions in diet order     Previous Nutrition Diagnosis:   Inadequate oral intake related to poor appetite, nausea, abdominal distention as evidenced by pt report of not eating 3-4 days PTA and being NPO the first 4 days of admit, need for ONS to  help meet nutritional needs   Evaluation: No change    MALNUTRITION  % Weight Loss:  None noted  % Intake:  </= 50% for >/= 5 days (severe malnutrition)  Subcutaneous Fat Loss:  None observed  Muscle Loss:  None observed  Fluid Retention:  Mild 2+ (not nutrition related)     Malnutrition Diagnosis: Patient does not meet two of the above criteria necessary for diagnosing malnutrition    CURRENT NUTRITION DIAGNOSIS  Inadequate oral intake related to altered GI function with low appetite, nausea, abdominal distention as evidenced by frequent interruptions to diet over 10-day admission, mostly restricted to liquid diet.     INTERVENTIONS  Recommendations / Nutrition Prescription  ADAT per GI  Change supplements from Ensure Clear to Ensure Enlive     Implementation  Medical Food Supplement: updated     Goals  Diet >FLD in the next 48 hours.     MONITORING AND EVALUATION:  Progress towards goals will be monitored and evaluated per protocol and Practice Guidelines    Barbra Mcduffie RD, LD  Pager: [phone]  Weekend Pager: [phone]

## 2024-01-23 NOTE — PROGRESS NOTES
[facility]  [facility]  Gastroenterology Progress note    Interval History:      Patient with multiple BM overnight.  No change in abdominal distention.  No nausea.  Eager to advance diet.      Vital Signs:      BP (!) 143/87 (BP Location: Left arm)   Pulse 96   Temp 99.2  F (37.3  C) (Oral)   Resp 16   Ht 1.829 m (6')   Wt 117.8 kg (259 lb 11.2 oz)   SpO2 96%   BMI 35.22 kg/m    Temp (24hrs), Av.2  F (37.3  C), Min:99.1  F (37.3  C), Max:99.2  F (37.3  C)    No data found.    Intake/Output Summary (Last 24 hours) at 2024 0919  Last data filed at 2024 2240  Gross per 24 hour   Intake 1080 ml   Output --   Net 1080 ml         Constitutional: NAD, comfortable  Cardiovascular: RRR, normal S1, S2   Respiratory: CTAB  Abdomen: firm, non-tender, distended    Additional Comments:  ROS, FH, SH: See initial GI consult for details.    Laboratory Data:  Recent Labs   Lab Test 24  0645 24  0648 24  0549   WBC 21.0* 22.6* 26.9*   HGB 8.1* 8.2* 8.6*   MCV 86 87 86   * 444 419     Recent Labs   Lab Test 24  2333 24  0645 24  0648 24  0549   NA  --  135 130* 132*   POTASSIUM 3.2* 3.2* 3.5 3.5   CHLORIDE  --  102 99 99   CO2  --     BUN  --  50.8* 55.0* 55.6*   CR  --  3.60* 3.59* 3.68*   ANIONGAP  --  10 10 10   LUBNA  --  7.8* 7.5* 7.5*     Recent Labs   Lab Test 24  0645 24  1402 24  0556 24  0525 24  0528 01/15/24  0548 24  0546 24  1333   ALBUMIN 2.7*  --  2.6* 2.8*   < > 2.8*  2.8*   < > 3.4*   BILITOTAL 0.9  --  1.2 1.5*   < > 2.2*   < > 6.1*   DBIL 0.48*  --   --   --   --  1.52*  --   --    ALT 36  --  43 50   < > 71*   < > 112*   AST 63*  --  51* 60*   < > 94*   < > 133*   ALKPHOS 87  --  90 78   < > 73   < > 106   PROTEIN  --  30*  --   --   --   --   --   --    LIPASE  --   --   --   --   --   --   --  776*    < > = values in this interval not displayed.          Assessment:  47 yo male with pancreatitis secondary to gallstones s/p ERCP 1/14/24 with sphincterotomy, stent placement.  Course notable for leukocytosis - trending down and ileus.  Pain continues to improve and tolerating oral intake.  Plan for eventual cholecystectomy as outpatient.  AXR 1/21 with multiple gas dilated small and large bowel loops suggestive of diffuse ileus.  Large BM yesterday.  Plan:  -  PD stent not seen on x-ray 1/21, no need to repeat.  -  ERCP 4-6 weeks from initial procedure to remove biliary stent.  -  Advance to full liquids.  -  Increase ambulation.  -  Minimize narcotics.  -  Pantoprazole BID.    Total Time Spent: 20 minutes    MARYCARMEN Meyer  [facility]  Cell:  [phone], not available after 11:30AM at this number  Dr Evans is covering the afternoon.  Please call Dr. Evans with any questions after 11:30AM.

## 2024-01-24 LAB
ALBUMIN SERPL BCG-MCNC: 2.5 G/DL (ref 3.5–5.2)
ALP SERPL-CCNC: 83 U/L (ref 40–150)
ALT SERPL W P-5'-P-CCNC: 39 U/L (ref 0–70)
ANION GAP SERPL CALCULATED.3IONS-SCNC: 10 MMOL/L (ref 7–15)
AST SERPL W P-5'-P-CCNC: 62 U/L (ref 0–45)
BILIRUB SERPL-MCNC: 0.8 MG/DL
BUN SERPL-MCNC: 35.1 MG/DL (ref 6–20)
CALCIUM SERPL-MCNC: 7.7 MG/DL (ref 8.6–10)
CHLORIDE SERPL-SCNC: 105 MMOL/L (ref 98–107)
CREAT SERPL-MCNC: 3.07 MG/DL (ref 0.67–1.17)
DEPRECATED HCO3 PLAS-SCNC: 22 MMOL/L (ref 22–29)
EGFRCR SERPLBLD CKD-EPI 2021: 24 ML/MIN/1.73M2
ERYTHROCYTE [DISTWIDTH] IN BLOOD BY AUTOMATED COUNT: 15.1 % (ref 10–15)
GLUCOSE SERPL-MCNC: 123 MG/DL (ref 70–99)
HCT VFR BLD AUTO: 22 % (ref 40–53)
HGB BLD-MCNC: 7.6 G/DL (ref 13.3–17.7)
MAGNESIUM SERPL-MCNC: 1.9 MG/DL (ref 1.7–2.3)
MCH RBC QN AUTO: 29.7 PG (ref 26.5–33)
MCHC RBC AUTO-ENTMCNC: 34.5 G/DL (ref 31.5–36.5)
MCV RBC AUTO: 86 FL (ref 78–100)
PLATELET # BLD AUTO: 663 10E3/UL (ref 150–450)
POTASSIUM SERPL-SCNC: 3.4 MMOL/L (ref 3.4–5.3)
POTASSIUM SERPL-SCNC: 3.5 MMOL/L (ref 3.4–5.3)
PROT SERPL-MCNC: 5.6 G/DL (ref 6.4–8.3)
RBC # BLD AUTO: 2.56 10E6/UL (ref 4.4–5.9)
SODIUM SERPL-SCNC: 137 MMOL/L (ref 135–145)
WBC # BLD AUTO: 15.9 10E3/UL (ref 4–11)

## 2024-01-24 PROCEDURE — 83735 ASSAY OF MAGNESIUM: CPT | Performed by: HOSPITALIST

## 2024-01-24 PROCEDURE — 250N000013 HC RX MED GY IP 250 OP 250 PS 637: Performed by: INTERNAL MEDICINE

## 2024-01-24 PROCEDURE — 87040 BLOOD CULTURE FOR BACTERIA: CPT | Performed by: HOSPITALIST

## 2024-01-24 PROCEDURE — 250N000011 HC RX IP 250 OP 636: Performed by: INTERNAL MEDICINE

## 2024-01-24 PROCEDURE — 250N000013 HC RX MED GY IP 250 OP 250 PS 637: Performed by: STUDENT IN AN ORGANIZED HEALTH CARE EDUCATION/TRAINING PROGRAM

## 2024-01-24 PROCEDURE — 85014 HEMATOCRIT: CPT | Performed by: HOSPITALIST

## 2024-01-24 PROCEDURE — 84132 ASSAY OF SERUM POTASSIUM: CPT | Performed by: HOSPITALIST

## 2024-01-24 PROCEDURE — 80053 COMPREHEN METABOLIC PANEL: CPT | Performed by: HOSPITALIST

## 2024-01-24 PROCEDURE — 36415 COLL VENOUS BLD VENIPUNCTURE: CPT | Performed by: HOSPITALIST

## 2024-01-24 PROCEDURE — 99233 SBSQ HOSP IP/OBS HIGH 50: CPT | Performed by: HOSPITALIST

## 2024-01-24 PROCEDURE — C9113 INJ PANTOPRAZOLE SODIUM, VIA: HCPCS | Performed by: INTERNAL MEDICINE

## 2024-01-24 PROCEDURE — 250N000013 HC RX MED GY IP 250 OP 250 PS 637: Performed by: PAIN MEDICINE

## 2024-01-24 PROCEDURE — 99232 SBSQ HOSP IP/OBS MODERATE 35: CPT | Performed by: INTERNAL MEDICINE

## 2024-01-24 PROCEDURE — 250N000011 HC RX IP 250 OP 636: Performed by: STUDENT IN AN ORGANIZED HEALTH CARE EDUCATION/TRAINING PROGRAM

## 2024-01-24 PROCEDURE — 120N000001 HC R&B MED SURG/OB

## 2024-01-24 RX ORDER — FUROSEMIDE 10 MG/ML
80 INJECTION INTRAMUSCULAR; INTRAVENOUS ONCE
Status: COMPLETED | OUTPATIENT
Start: 2024-01-24 | End: 2024-01-24

## 2024-01-24 RX ADMIN — FUROSEMIDE 80 MG: 10 INJECTION, SOLUTION INTRAMUSCULAR; INTRAVENOUS at 14:21

## 2024-01-24 RX ADMIN — HYDROMORPHONE HYDROCHLORIDE 2 MG: 2 TABLET ORAL at 12:18

## 2024-01-24 RX ADMIN — METOPROLOL TARTRATE 25 MG: 25 TABLET, FILM COATED ORAL at 21:00

## 2024-01-24 RX ADMIN — METOPROLOL TARTRATE 25 MG: 25 TABLET, FILM COATED ORAL at 08:32

## 2024-01-24 RX ADMIN — PIPERACILLIN AND TAZOBACTAM 3.38 G: 3; .375 INJECTION, POWDER, FOR SOLUTION INTRAVENOUS at 05:12

## 2024-01-24 RX ADMIN — PANTOPRAZOLE SODIUM 40 MG: 40 INJECTION, POWDER, FOR SOLUTION INTRAVENOUS at 08:32

## 2024-01-24 RX ADMIN — HYDROMORPHONE HYDROCHLORIDE 2 MG: 2 TABLET ORAL at 19:59

## 2024-01-24 RX ADMIN — METHOCARBAMOL 500 MG: 500 TABLET ORAL at 08:32

## 2024-01-24 RX ADMIN — PIPERACILLIN AND TAZOBACTAM 3.38 G: 3; .375 INJECTION, POWDER, FOR SOLUTION INTRAVENOUS at 10:44

## 2024-01-24 RX ADMIN — LIDOCAINE: 50 OINTMENT TOPICAL at 21:03

## 2024-01-24 RX ADMIN — HYDROMORPHONE HYDROCHLORIDE 2 MG: 2 TABLET ORAL at 04:05

## 2024-01-24 RX ADMIN — METHOCARBAMOL 500 MG: 500 TABLET ORAL at 18:03

## 2024-01-24 RX ADMIN — PIPERACILLIN AND TAZOBACTAM 3.38 G: 3; .375 INJECTION, POWDER, FOR SOLUTION INTRAVENOUS at 16:22

## 2024-01-24 RX ADMIN — PANTOPRAZOLE SODIUM 40 MG: 40 INJECTION, POWDER, FOR SOLUTION INTRAVENOUS at 21:00

## 2024-01-24 ASSESSMENT — ACTIVITIES OF DAILY LIVING (ADL)
ADLS_ACUITY_SCORE: 22

## 2024-01-24 NOTE — PROGRESS NOTES
[facility]    Medicine Progress Note - Hospitalist Service    Date of Admission:  1/13/2024    Assessment & Plan   Pt is 28-year-old male with no significant past medical history who presented with 2-day history of abdominal bloating found to have acute gallstone pancreatitis.     Acute hemorrhagic biliary pancreatitis  Cholelithiasis  Choledocholithiasis with 3 cm CBD dilatation  Elevated LFTs  Ileus  CT abdomen extensive signs of acute pancreatitis, no choledocholithiasis noted but cholelithiasis noted without cholecystitis.   MRCP on 1/13/24 showed choledocholithiasis with distal common bile duct measuring up to 3 mm with mild upstream biliary ductal dilatation.  Acute hemorrhagic pancreatitis with extensive peripancreatic inflammation and fluid as described, cholelithiasis without evidence of acute cholecystitis.  Reactive edema within the proximal duodenum related to pancreatitis.  Admitted to inpatient.  [facility] consulted, appreciate their assistance.  S/p ERCP on 1/14 which showed: choledocholithiasis was found with complete removal with biliary sphincterotomy and biliary stent placed given severe edema; pancreatic stent placed; severe erosive gastropathy and duodenopathy were also found incidentally.  Initially NPO and given IV fluids. Diet subsequently slowly advanced, however he developed abdominal distention on 1/21/24.  Abdominal x-ray on 1/21/24 showed findings consistent with diffuse ileus.  Diet downgraded to clear liquids on 1/21/24.  Large BM on 1/22/24 and has continued to have bowel movements since then. Abdomen still distended, but appears to be slowly improving.  Minimize narcotic usage as able.  Encourage ambulation.  IV fluids discontinued on 1/23/24 per nephrology.  General surgery consulted during this admission, appreciate their assistance. Signed off on 1/19/24, recommend outpatient follow up in one month, consider elective outpatient cholecystectomy after he  recovers from his current illness.  Continue inpatient care for now pending improvement in ileus, ability to advance diet, improvement in renal function, stable hemoglobin, consultant recommendations.    Positive blood culture, suspect contaminant  Fever  Leukocytosis  Leukocytosis noted upon admission, trended down initially, then trended up. Peaked at 29.1 on 1/18/24.  Fever started on 1/21/24, up to 100.9.  Blood cultures on 1/21/24 with 2/4 bottles positive for Staph epidermidis.  Suspect this is a contaminant.   Empiric Zosyn started on 1/21/24, will discontinued on 1/24/24.  Repeat blood cultures on 1/24/24 pending.  MRSA nasal swab negative for MRSA.  Bilateral lower extremity Doppler ultrasound negative on 1/22/24 - ordered to exclude DVT as etiology of fever.     Acute kidney injury, non-oliguric  ATN  Hyponatremia  Nonanion gap metabolic acidosis, likely secondary to BRENDA  Creatinine of 2.5 and sodium of 122 at presentation. No previous labs to compare.  Nephrology consulted, appreciate their assistance.  Pancreatitis treated as noted above including IV fluids.  Sodium slowly trended up, within normal limits as of 1/22/24.  Creatinine trend up and peaked at 4.36 on 1/16/24. Creatinine slowly trending down since then. Creatinine 3.07 on 1/24/24.  Urine output has been adequate, however since hollingsworth removed need better documentation.  Ileus and increased intra-abdominal pressure likely contributing to slow recovery.  IV fluids discontinued on 1/23/24 per nephrology.  IV lasix given on 1/24/24 per nephrology.  Gabapentin placed on hold by nephrology on 1/22/24.  Recheck labs in AM.    Hypocalcemia  Low normal when corrected for albumin.  Monitor.    Acute anemia, likely multifactorial including acute blood loss, dilutional, anemia of inflammation  Erosive gastropathy and duodenopathy  Unknown baseline, however on admission hemoglobin 14.3. Hemoglobin down to 11.8 on 1/14/24, then slowly trending down. CT  abdomen/pelvis on 1/17/24 showed extensive acute hemorrhagic pancreatitis. Iron levels low on 1/20/24, but ferritin significantly elevated.  Erosive gastropathy and duodenopathy noted during ERCP on 1/14/24, no active bleeding noted.  Continue BID protonix.  Inflammation and renal disease likely contributing to anemia.  IV venofer given on 1/20 and 1/21 per nephrology.  Hemoglobin 7.6 on 1/24/24.  Recheck in AM.  Consider CT abdomen/pelvis if hemoglobin trends down further.    Tobacco use  Briefly counseled on cessation.  Nicotine patch ordered.  PRN nicotrol spray available.          Diet: Snacks/Supplements Adult: Ensure Enlive; With Meals  Low Fiber Diet    DVT Prophylaxis: Pneumatic Compression Devices  Cornejo Catheter: Not present  Lines: None     Cardiac Monitoring: None  Code Status: Full Code      Clinically Significant Risk Factors        # Hypokalemia: Lowest K = 3.2 mmol/L in last 2 days, will replace as needed       # Hypoalbuminemia: Lowest albumin = 2.5 g/dL at 1/24/2024  6:13 AM, will monitor as appropriate            # Obesity: Estimated body mass index is 35.22 kg/m  as calculated from the following:    Height as of this encounter: 1.829 m (6').    Weight as of this encounter: 117.8 kg (259 lb 11.2 oz).             Disposition Plan      Expected Discharge Date: 01/25/2024,  3:00 PM    Destination: home with family  Discharge Comments: pending diet            Keenan Soares MD  Hospitalist Service  [facility]  Securely message with [employer] (more info)  Text page via [facility] Paging/Directory   ______________________________________________________________________    Interval History   Talha Alexander was seen this morning.  He feels a little better today.  Continues to have bowel movements, no melena or hematochezia.  No nausea/vomiting.  Abdomen still feels quite bloated, not much change from yesterday.  He is tolerating full liquids and is looking forward to getting some  solid food.  No fevers, chest pain, urinary symptoms.  Case briefly discussed with GI this morning.    Physical Exam   Vital Signs: Temp: 97.3  F (36.3  C) Temp src: Oral BP: (!) 146/93 Pulse: 97   Resp: 16 SpO2: 97 % O2 Device: None (Room air)    Weight: 259 lbs 11.2 oz    Constitutional: awake, alert, cooperative, no apparent distress, sitting up in a chair  Respiratory: no increased work of breathing, clear to auscultation bilaterally, no crackles or wheezing  Cardiovascular: regular rate and rhythm, normal S1 and S2, no murmur noted  GI: bowel sounds positive, distended, non-tender  Skin: warm, dry  Musculoskeletal: 2+ lower extremity pitting edema present  Neurologic: awake, alert, oriented, answers questions appropriately, move all extremities    Medical Decision Making       40 MINUTES SPENT BY ME on the date of service doing chart review, history, exam, documentation & further activities per the note.      Data     I have personally reviewed the following data over the past 24 hrs:    15.9 (H)  \   7.6 (L)   / 663 (H)     137 105 35.1 (H) /  123 (H)   3.5 22 3.07 (H) \     ALT: 39 AST: 62 (H) AP: 83 TBILI: 0.8   ALB: 2.5 (L) TOT PROTEIN: 5.6 (L) LIPASE: N/A

## 2024-01-24 NOTE — PLAN OF CARE
Goal Outcome Evaluation:  Date & Time: 1/23/24 4509-3228  Summary: Acute gallstone pancreatitis  Behavior & Aggression: Green  Fall Risk: No  Orientation:A&Ox4  ABNL VS/O2:VSS on RA  ABNL Labs: K+ 3.3 replaced, recheck 3.2. New replacement started again. Recheck at midnight   Pain Management:PRN Dilaudid and Lidocaine cream for back pain  Bowel/Bladder: Up to BR  Drains: PIV SL with intermittent abx  Diet:Full liquid, tolerating well  Activity Level: Ind  Tests/Procedures: None  Anticipated  DC Date: Pending  Significant Information: Neph/GI following. Takes dilaudid once every 12 hours. Pt wants to take shower when his GF gets here sometime tonight.

## 2024-01-24 NOTE — PROGRESS NOTES
Goal Outcome Evaluation:       Plan of Care Reviewed With: patient     Overall Patient Progress: improvingOverall Patient Progress: improving     Date & Time: January 24, 2024 6716-1328   Surgery/POD#: POD 9 ERCP Stent placement   Acute pancreatitis w/ gallstone  Behavior & Aggression: Green   Fall Risk: No   Orientation:A&Ox4   ABNL VS/O2:VSS on RA   ABNL Labs: See chart   Pain Management:PRN dilaudid and robaxin   Bowel/Bladder: Voiding adequately. BS active, passing flatus, loose BM throughout shift    Drains: NA   Diet:Full liquids   Activity Level: Ind   Tests/Procedures: NA   Anticipated  DC Date: Pending   Significant Information: PIV infusing intermittent abx.

## 2024-01-24 NOTE — PLAN OF CARE
Goal Outcome Evaluation:       Date & Time: 1/23/24 4016-8495  Summary: Acute gallstone pancreatitis  Behavior & Aggression: Green  Fall Risk: No  Orientation:A&Ox4  ABNL VS/O2:VSS on RA  ABNL Labs: K+ 3.3 replaced, recheck 3.2. New replacement started again. Recheck at midnight   Pain Management:PRN Dilaudid given x1 for lower back pain  Bowel/Bladder: Up to BR last BM was today  Drains: PIV SL with intermittent abx  Diet:Full liquid, tolerating well  Activity Level: Ind  Tests/Procedures: None  Anticipated  DC Date: Pending  Significant Information: Neph/GI following. Takes dilaudid once every 6 hours. Pt wants to take shower when his GF gets here sometime tonight.

## 2024-01-24 NOTE — PROGRESS NOTES
GASTROENTEROLOGY PROGRESS NOTE     IMPRESSION:  1.  Pancreatitis-described as hemorrhagic on imaging secondary to gallstones status post ERCP 2024 with sphincterotomy and biliary and pancreas duct stent placement.  Follow-up CT and x-ray do not show pancreas duct stent, this has passed.  - Plan for cholecystectomy as an outpatient.  - Clinically improving    2.  Ileus-secondary to pancreatitis.  Patient is now having bowel movements and passing gas.  Tolerated full liquid diet.  Will advance to a low fiber diet if he tolerates this, then we will pursue regular diet.    3.  Anemia-clinically improved, no abdominal pain, no melena or hematochezia.  Will continue to monitor this daily.    4.  Acute renal insufficiency-management per hospitalist and nephrology.    RECOMMENDATIONS:  1.  Advance diet to low fiber diet  2.  Monitor CBC and CMP    Jaxon Evans MD  [facility]  [phone]      ________________________________________________________________________      SUBJECTIVE:  Patient reports abdominal distention, but is passing flatus and is having soft stools.  He would like to advance his diet.  There is no abdominal pain.       OBJECTIVE:  BP (!) 146/93 (BP Location: Left arm)   Pulse 97   Temp 97.3  F (36.3  C) (Oral)   Resp 16   Ht 1.829 m (6')   Wt 117.8 kg (259 lb 11.2 oz)   SpO2 97%   BMI 35.22 kg/m    Temp (24hrs), Av  F (36.7  C), Min:97.3  F (36.3  C), Max:98.7  F (37.1  C)    No data found.     PHYSICAL EXAM  GEN: Alert, NAD.    HRT: reg  LUNGS: CTA  ABD: +BS, non-tender, distended, no rebound or guarding.    Additional Data:  I have reviewed the patient's new clinical lab results:     Recent Labs   Lab Test 24  0613 24  0645 24  0648   WBC 15.9* 21.0* 22.6*   HGB 7.6* 8.1* 8.2*   MCV 86 86 87   * 501* 444     Recent Labs   Lab Test 24  0613 24  0029 24  1740 24  1020 24  2333 24  0645     --   --   135  --  135   POTASSIUM 3.5 3.4 3.2* 3.3*   < > 3.2*   CHLORIDE 105  --   --  102  --  102   CO2 22  --   --  24  --  23   BUN 35.1*  --   --  41.6*  --  50.8*   CR 3.07*  --   --  3.29*  --  3.60*   ANIONGAP 10  --   --  9  --  10   LUBNA 7.7*  --   --  8.0*  --  7.8*   *  --   --  108*  --  118*    < > = values in this interval not displayed.     Recent Labs   Lab Test 01/24/24  0613 01/22/24  0645 01/21/24  1402 01/18/24  0556 01/14/24  0546 01/13/24  1333   ALBUMIN 2.5* 2.7*  --  2.6*   < > 3.4*   BILITOTAL 0.8 0.9  --  1.2   < > 6.1*   ALT 39 36  --  43   < > 112*   AST 62* 63*  --  51*   < > 133*   PROTEIN  --   --  30*  --   --   --    LIPASE  --   --   --   --   --  776*    < > = values in this interval not displayed.

## 2024-01-24 NOTE — PROGRESS NOTES
Renal Medicine Progress Note            Assessment/Plan:     48 y.o man with severe hemorrhagic pancreatitis due to biliary stone.      # Severe acute kidney injury: Slow to improve. Same for several days. On-going abdominal distention and intra-abdominal hypertension is slowing kidney function recovery. Slowly improving.      # Severe hemorrhagic pancreatitis: Advancing diet.               -per GI     # Ileus: Improving. Having BMSs     # FEN: ++ peripheral edema. Hypokalemia-received KCL     # Hypertension:                -metoprolol      Plan:  # Lasix 80 mg IV x 1  # Tract I/O    I reviewed notes from GI (Dr. Evans) and IM (Dr. Ricci)        Interval History:     Afebrile. VSS.  He feels better each day.  He is having regular BMs.  Tolerating his diet-advancing to high fiber.   Kidney function is slowly improving.         Medications and Allergies:      [Held by provider] gabapentin  100 mg Oral BID    lidocaine   Topical TID    metoprolol tartrate  25 mg Oral BID    nicotine  1 patch Transdermal Daily    nicotine   Transdermal Q8H    pantoprazole  40 mg Intravenous BID    piperacillin-tazobactam  3.375 g Intravenous Q6H    sodium chloride (PF)  3 mL Intracatheter Q8H        Allergies   Allergen Reactions    Peanut-Containing Drug Products     Tree Nuts [Nuts]             Physical Exam:   Vitals were reviewed   , Blood pressure (!) 146/93, pulse 97, temperature 97.3  F (36.3  C), temperature source Oral, resp. rate 16, height 1.829 m (6'), weight 117.8 kg (259 lb 11.2 oz), SpO2 97%.    Wt Readings from Last 3 Encounters:   01/18/24 117.8 kg (259 lb 11.2 oz)       Intake/Output Summary (Last 24 hours) at 1/24/2024 1316  Last data filed at 1/24/2024 1258  Gross per 24 hour   Intake 1560 ml   Output --   Net 1560 ml     GENERAL APPEARANCE: NAD  HEENT:  Eyes/ears/nose/neck grossly normal  RESP: lungs cta b c good efforts, no crackles, rhonchi or wheezes  CV: RRR, nl S1/S2,  ABDOMEN: pretty distended, semi firm,  active bs. Same.   EXTREMITIES/SKIN: no rashes/lesions on observed skin; ++ edema  NEURO: Awake, alert and conversing normally.         Data:     CBC RESULTS:     Recent Labs   Lab 01/24/24  0613 01/22/24  0645 01/21/24  0648 01/20/24  0549 01/19/24  0538 01/18/24  0556   WBC 15.9* 21.0* 22.6* 26.9* 29.2* 29.1*   RBC 2.56* 2.73* 2.75* 2.91* 2.97* 3.14*   HGB 7.6* 8.1* 8.2* 8.6* 8.7* 9.3*   HCT 22.0* 23.5* 23.8* 25.1* 25.2* 26.7*   * 501* 444 419 389 388       Basic Metabolic Panel:  Recent Labs   Lab 01/24/24  0613 01/24/24  0029 01/23/24  1740 01/23/24  1020 01/22/24  2333 01/22/24  0645 01/21/24  0648 01/20/24  0549 01/19/24  0538     --   --  135  --  135 130* 132* 130*   POTASSIUM 3.5 3.4 3.2* 3.3* 3.2* 3.2* 3.5 3.5 3.4   CHLORIDE 105  --   --  102  --  102 99 99 96*   CO2 22  --   --  24  --  23 21* 23 23   BUN 35.1*  --   --  41.6*  --  50.8* 55.0* 55.6* 62.7*   CR 3.07*  --   --  3.29*  --  3.60* 3.59* 3.68* 3.74*   *  --   --  108*  --  118* 126* 116* 164*   LUBNA 7.7*  --   --  8.0*  --  7.8* 7.5* 7.5* 7.4*       INRNo lab results found in last 7 days.   Attestation:   I have reviewed today's relevant vital signs, notes, medications, labs and imaging.    Kirill Verduzco MD  [facility] - Nephrology  Office phone :[phone]  Pager: [phone]

## 2024-01-25 LAB
ALBUMIN SERPL BCG-MCNC: 2.6 G/DL (ref 3.5–5.2)
ALP SERPL-CCNC: 83 U/L (ref 40–150)
ALT SERPL W P-5'-P-CCNC: 36 U/L (ref 0–70)
ANION GAP SERPL CALCULATED.3IONS-SCNC: 9 MMOL/L (ref 7–15)
AST SERPL W P-5'-P-CCNC: 51 U/L (ref 0–45)
BILIRUB SERPL-MCNC: 0.7 MG/DL
BUN SERPL-MCNC: 30.8 MG/DL (ref 6–20)
CALCIUM SERPL-MCNC: 8 MG/DL (ref 8.6–10)
CHLORIDE SERPL-SCNC: 101 MMOL/L (ref 98–107)
CREAT SERPL-MCNC: 2.79 MG/DL (ref 0.67–1.17)
DEPRECATED HCO3 PLAS-SCNC: 25 MMOL/L (ref 22–29)
EGFRCR SERPLBLD CKD-EPI 2021: 27 ML/MIN/1.73M2
ERYTHROCYTE [DISTWIDTH] IN BLOOD BY AUTOMATED COUNT: 14.8 % (ref 10–15)
GLUCOSE SERPL-MCNC: 111 MG/DL (ref 70–99)
HCT VFR BLD AUTO: 22.5 % (ref 40–53)
HGB BLD-MCNC: 7.8 G/DL (ref 13.3–17.7)
MAGNESIUM SERPL-MCNC: 1.8 MG/DL (ref 1.7–2.3)
MCH RBC QN AUTO: 29.9 PG (ref 26.5–33)
MCHC RBC AUTO-ENTMCNC: 34.7 G/DL (ref 31.5–36.5)
MCV RBC AUTO: 86 FL (ref 78–100)
PLATELET # BLD AUTO: 718 10E3/UL (ref 150–450)
POTASSIUM SERPL-SCNC: 2.9 MMOL/L (ref 3.4–5.3)
POTASSIUM SERPL-SCNC: 3.1 MMOL/L (ref 3.4–5.3)
POTASSIUM SERPL-SCNC: 3.5 MMOL/L (ref 3.4–5.3)
PROT SERPL-MCNC: 5.9 G/DL (ref 6.4–8.3)
RBC # BLD AUTO: 2.61 10E6/UL (ref 4.4–5.9)
SODIUM SERPL-SCNC: 135 MMOL/L (ref 135–145)
WBC # BLD AUTO: 13.5 10E3/UL (ref 4–11)

## 2024-01-25 PROCEDURE — 99232 SBSQ HOSP IP/OBS MODERATE 35: CPT | Performed by: HOSPITALIST

## 2024-01-25 PROCEDURE — 36415 COLL VENOUS BLD VENIPUNCTURE: CPT | Performed by: HOSPITALIST

## 2024-01-25 PROCEDURE — 250N000013 HC RX MED GY IP 250 OP 250 PS 637: Performed by: HOSPITALIST

## 2024-01-25 PROCEDURE — 120N000001 HC R&B MED SURG/OB

## 2024-01-25 PROCEDURE — C9113 INJ PANTOPRAZOLE SODIUM, VIA: HCPCS | Performed by: INTERNAL MEDICINE

## 2024-01-25 PROCEDURE — 99232 SBSQ HOSP IP/OBS MODERATE 35: CPT | Performed by: INTERNAL MEDICINE

## 2024-01-25 PROCEDURE — 83735 ASSAY OF MAGNESIUM: CPT | Performed by: HOSPITALIST

## 2024-01-25 PROCEDURE — 84132 ASSAY OF SERUM POTASSIUM: CPT | Performed by: HOSPITALIST

## 2024-01-25 PROCEDURE — 250N000013 HC RX MED GY IP 250 OP 250 PS 637: Performed by: CLINICAL NURSE SPECIALIST

## 2024-01-25 PROCEDURE — 85048 AUTOMATED LEUKOCYTE COUNT: CPT | Performed by: INTERNAL MEDICINE

## 2024-01-25 PROCEDURE — 80053 COMPREHEN METABOLIC PANEL: CPT | Performed by: INTERNAL MEDICINE

## 2024-01-25 PROCEDURE — 36415 COLL VENOUS BLD VENIPUNCTURE: CPT | Performed by: INTERNAL MEDICINE

## 2024-01-25 PROCEDURE — 250N000011 HC RX IP 250 OP 636: Performed by: INTERNAL MEDICINE

## 2024-01-25 PROCEDURE — 250N000013 HC RX MED GY IP 250 OP 250 PS 637: Performed by: PAIN MEDICINE

## 2024-01-25 PROCEDURE — 250N000013 HC RX MED GY IP 250 OP 250 PS 637: Performed by: INTERNAL MEDICINE

## 2024-01-25 PROCEDURE — 250N000013 HC RX MED GY IP 250 OP 250 PS 637: Performed by: STUDENT IN AN ORGANIZED HEALTH CARE EDUCATION/TRAINING PROGRAM

## 2024-01-25 PROCEDURE — 99207 PR APP CREDIT; MD BILLING SHARED VISIT: CPT | Performed by: CLINICAL NURSE SPECIALIST

## 2024-01-25 RX ORDER — HYDROMORPHONE HYDROCHLORIDE 2 MG/1
2 TABLET ORAL EVERY 4 HOURS PRN
Status: DISCONTINUED | OUTPATIENT
Start: 2024-01-25 | End: 2024-01-26 | Stop reason: HOSPADM

## 2024-01-25 RX ORDER — POTASSIUM CHLORIDE 1500 MG/1
40 TABLET, EXTENDED RELEASE ORAL ONCE
Status: COMPLETED | OUTPATIENT
Start: 2024-01-25 | End: 2024-01-25

## 2024-01-25 RX ORDER — CALCIUM CARBONATE 500 MG/1
500 TABLET, CHEWABLE ORAL 2 TIMES DAILY
Status: DISCONTINUED | OUTPATIENT
Start: 2024-01-25 | End: 2024-01-26 | Stop reason: HOSPADM

## 2024-01-25 RX ORDER — LIDOCAINE 50 MG/G
OINTMENT TOPICAL 4 TIMES DAILY
Status: DISCONTINUED | OUTPATIENT
Start: 2024-01-25 | End: 2024-01-26 | Stop reason: HOSPADM

## 2024-01-25 RX ORDER — POTASSIUM CHLORIDE 1500 MG/1
20 TABLET, EXTENDED RELEASE ORAL ONCE
Status: COMPLETED | OUTPATIENT
Start: 2024-01-25 | End: 2024-01-25

## 2024-01-25 RX ORDER — METHOCARBAMOL 750 MG/1
750 TABLET, FILM COATED ORAL 3 TIMES DAILY PRN
Status: DISCONTINUED | OUTPATIENT
Start: 2024-01-25 | End: 2024-01-26 | Stop reason: HOSPADM

## 2024-01-25 RX ORDER — FUROSEMIDE 10 MG/ML
80 INJECTION INTRAMUSCULAR; INTRAVENOUS ONCE
Status: COMPLETED | OUTPATIENT
Start: 2024-01-25 | End: 2024-01-25

## 2024-01-25 RX ADMIN — HYDROMORPHONE HYDROCHLORIDE 2 MG: 2 TABLET ORAL at 02:08

## 2024-01-25 RX ADMIN — HYDROMORPHONE HYDROCHLORIDE 2 MG: 2 TABLET ORAL at 22:04

## 2024-01-25 RX ADMIN — CALCIUM CARBONATE (ANTACID) CHEW TAB 500 MG 500 MG: 500 CHEW TAB at 10:58

## 2024-01-25 RX ADMIN — METOPROLOL TARTRATE 25 MG: 25 TABLET, FILM COATED ORAL at 20:00

## 2024-01-25 RX ADMIN — LIDOCAINE: 50 OINTMENT TOPICAL at 08:20

## 2024-01-25 RX ADMIN — METHOCARBAMOL 500 MG: 500 TABLET ORAL at 06:09

## 2024-01-25 RX ADMIN — HYDROMORPHONE HYDROCHLORIDE 2 MG: 2 TABLET ORAL at 17:57

## 2024-01-25 RX ADMIN — POTASSIUM CHLORIDE 40 MEQ: 1500 TABLET, EXTENDED RELEASE ORAL at 08:43

## 2024-01-25 RX ADMIN — METHOCARBAMOL 750 MG: 750 TABLET ORAL at 20:00

## 2024-01-25 RX ADMIN — METOPROLOL TARTRATE 25 MG: 25 TABLET, FILM COATED ORAL at 08:20

## 2024-01-25 RX ADMIN — CALCIUM CARBONATE (ANTACID) CHEW TAB 500 MG 500 MG: 500 CHEW TAB at 20:00

## 2024-01-25 RX ADMIN — PANTOPRAZOLE SODIUM 40 MG: 40 INJECTION, POWDER, FOR SOLUTION INTRAVENOUS at 20:00

## 2024-01-25 RX ADMIN — PANTOPRAZOLE SODIUM 40 MG: 40 INJECTION, POWDER, FOR SOLUTION INTRAVENOUS at 08:20

## 2024-01-25 RX ADMIN — POTASSIUM CHLORIDE 40 MEQ: 1500 TABLET, EXTENDED RELEASE ORAL at 16:34

## 2024-01-25 RX ADMIN — POTASSIUM CHLORIDE 20 MEQ: 1500 TABLET, EXTENDED RELEASE ORAL at 10:58

## 2024-01-25 RX ADMIN — FUROSEMIDE 80 MG: 10 INJECTION, SOLUTION INTRAMUSCULAR; INTRAVENOUS at 15:42

## 2024-01-25 RX ADMIN — HYDROMORPHONE HYDROCHLORIDE 2 MG: 2 TABLET ORAL at 13:33

## 2024-01-25 RX ADMIN — HYDROMORPHONE HYDROCHLORIDE 2 MG: 2 TABLET ORAL at 08:20

## 2024-01-25 ASSESSMENT — ACTIVITIES OF DAILY LIVING (ADL)
ADLS_ACUITY_SCORE: 22

## 2024-01-25 NOTE — PROGRESS NOTES
"[facility] ACUTE PAIN SERVICE    ([facility], [facility], [facility], Formerly [facility])  Pain Progress Note    Assessment/Plan:  Talha Alexander is a 48 year old male who was admitted on 1/13/2024.  Pain Service is asked to see the patient for pain management associated with acute pancreatitis status post ERCP, complicated by pancreatic hemophage and acute kidney injury.  Patient admitted due to one week of worsening abdominal pain with concern gray stools and \"yellowness of eyes\"   Medical history otherwise significant for obesity, tobacco dependence, snoring.  Denies history of substance use, and identifies that he rarely drinks alcohol.    Pain slowly improving.      Over the past 24 hours Talha received:  4(2mg) hydromorphone 32 MME.  Robaxin tid prn        PLAN:   1) Pain is consistent with visceral, secondary to acute pancreatitis , in the setting of obesity and tobacco dependence. Treatment plan includes multimodal pain approach, medications as listed below, reviewed.  Discharge medications, advised keeping track of doses, educated on tapering off as pain improves, watch for constipation and stool softeners, no driving or alcohol with opioids, store medications in a safe place, advised to take no more than the prescribed dose as increased doses may cause respiratory depression or death. Patient is understanding of the plan. All questions and concerns addressed to patient's satisfaction.    2)Multimodal Medication Therapy  Topical:  lidocaine ointment tid  Adjuvants: CrCl is 37.2  mg/dl\", Robaxin 750 mg tid prn Gabapentin on hold for now ( renal recovery and edema), will hold on scheduling tylenol with LFTs slightly elevated  Antidepressants/anxiolytics: none    Opioids: Hydromorphone(Dilaudid) 2 mg every 4 hrs prn   IV Pain medication: Dilaudid discontinued.    3) Non-medication interventions- Ice, Heat, physical therapy, distraction aroma therapy   4) Interdisciplinary team care: " Appreciate in put from general surgery, nephrology and gastroenterology.   5)Constipation Prophylaxis- senna and miralax. Continue to monitor.   6) Follow up   -acute pain team will continue to follow peripherally for next day if pain controlled will sign off.   -Opioid prescriber has been none. PCP is none      -Discharge Recommendations - We recommend prescribing the following at the time of discharge:    Dilaudid 2 mg tid prn  #12 tablet(s)  Follow up with GI Nephrology   Establish with PCP tobacco cessation,weight loss sleep study evaluation   Disposition: home      Prescribing at discharge: hospitalist    Pain Service will sign off      Subjective:  Abdominal pain greatly improved.  Primary pain is in back, still feels quite distended with constant pain in lower back   Dilaudid is the most helpful robaxin helps to tide over until he is able to get a dose of the dilaudid.    Had large formed stool today.  Hoping to be able to dismiss from hospital soon        BRENDA (acute kidney injury) (H24)   Patient Active Problem List   Diagnosis    Acute pancreatitis    Calculus of gallbladder without cholecystitis    Hyponatremia    Gallstone pancreatitis    Elevated LFTs    BRENDA (acute kidney injury) (H24)    Acute biliary pancreatitis, unspecified complication status        History   Drug Use Not on file         Tobacco Use      Smoking status: None      Smokeless tobacco: None         [Held by provider] gabapentin  100 mg Oral BID    lidocaine   Topical TID    metoprolol tartrate  25 mg Oral BID    nicotine  1 patch Transdermal Daily    nicotine   Transdermal Q8H    pantoprazole  40 mg Intravenous BID    potassium chloride  20 mEq Oral Once    potassium chloride  40 mEq Oral Once    sodium chloride (PF)  3 mL Intracatheter Q8H       Objective:  Vital signs in last 24 hours:  B/P: 129/80, T: 98.2, P: 97, R: 18   Blood pressure 129/80, pulse 97, temperature 98.2  F (36.8  C), temperature source Oral, resp. rate 18, height  1.829 m (6'), weight 113.7 kg (250 lb 10.6 oz), SpO2 98%.      Weight:   Wt Readings from Last 2 Encounters:   01/25/24 113.7 kg (250 lb 10.6 oz)         Intake/Output:    Intake/Output Summary (Last 24 hours) at 1/25/2024 0821  Last data filed at 1/25/2024 0730  Gross per 24 hour   Intake 1500 ml   Output 1650 ml   Net -150 ml        Review of Systems:   As per subjective, all others negative.    Physical Exam:     General Appearance:  Alert, cooperative, no distress, appears stated age   Patient is sitting up in bed   Head:  Normocephalic, without obvious abnormality, atraumatic   Eyes:  PERRL, conjunctiva/corneas clear, EOM's intact   ENT/Throat: Lips moist    Lymph/Neck: Supple, symmetrical, trachea midline   Lungs:   respirations unlabored   Abdomen:   Soft, non-tender, distended, bowel sounds present   Musculoskeletal: Extremities normal, atraumatic  I   Skin: Skin is intact    Neurologic: Alert and oriented X 3, Moves all 4 extremities         Imaging:  Personally Reviewed.    Results for orders placed or performed during the hospital encounter of 01/13/24   US Abdomen Limited    Impression    IMPRESSION:  1.  Cholelithiasis.  2.  Mildly heterogeneous liver with mild hepatic steatosis.  3.  Small ascites.  4.  Known pancreatitis was better assessed on the CT earlier today.       CT Abdomen Pelvis w/o Contrast    Impression    IMPRESSION:     1.  Acute pancreatitis with extensive peripancreatic inflammation as described. No organized peripancreatic fluid collections. This noncontrast exam cannot evaluate for parenchymal necrosis.    2.  Cholelithiasis. No biliary ductal dilation.    3.  Reactive wall thickening of the duodenum related to the pancreatitis.    4.  Small amount of ascites.    5.  Trace left pleural effusion with adjacent atelectasis.   MR Abdomen MRCP without Contrast    Impression    IMPRESSION:    1.  At least 2 choledocholiths within the distal common bile duct measuring up to 3 mm with mild  upstream biliary ductal dilation.    2.  Acute hemorrhagic pancreatitis with extensive peripancreatic inflammation and fluid as described. No organized peripancreatic fluid collections. No pancreatic ductal dilation. This noncontrast exam cannot evaluate for parenchymal necrosis.    3.  Cholelithiasis, without evidence of acute cholecystitis.    4.  Reactive edema within the proximal duodenum related to the pancreatitis. A mildly dilated left upper quadrant jejunal loop is also likely a focal ileus related to the pancreatitis.    5.  Small amount of ascites.    6.  Trace left pleural effusion with adjacent atelectasis.   CT Abdomen Pelvis w/o Contrast    Impression    IMPRESSION:   1.  Nondiagnostic evaluation for pancreatic necrosis given noncontrast  technique.  2.  Since 1/13/2024, overall similar extensive acute hemorrhagic  pancreatitis with surrounding ill-defined fluid and pockets of fluid  along the left upper quadrant/stomach greater curvature. No internal  gas to suggest superinfection.  3.  Cholelithiasis with probable stone in the cystic duct. Common bile  duct stent without significant biliary ductal dilatation.  4.  Pancreatic duct stent has migrated, now within the ascending  colon.  5.  Trace left pleural effusion with adjacent atelectasis. Increased  body wall edema.    LING RODRIGUEZ MD         SYSTEM ID:  [id number]   XR Abdomen 2 Views    Impression    IMPRESSION: Multiple gas dilated small and large bowel loops suggestive of a diffuse ileus. A biliary stent is noted. No free air identified.          Lab Results:  Personally Reviewed.   Last Comprehensive Metabolic Panel:  Sodium   Date Value Ref Range Status   01/25/2024 135 135 - 145 mmol/L Final     Comment:     Reference intervals for this test were updated on 09/26/2023 to more accurately reflect our healthy population. There may be differences in the flagging of prior results with similar values performed with this method.  Interpretation of those prior results can be made in the context of the updated reference intervals.      Potassium   Date Value Ref Range Status   01/25/2024 2.9 (L) 3.4 - 5.3 mmol/L Final     Chloride   Date Value Ref Range Status   01/25/2024 101 98 - 107 mmol/L Final     Carbon Dioxide (CO2)   Date Value Ref Range Status   01/25/2024 25 22 - 29 mmol/L Final     Anion Gap   Date Value Ref Range Status   01/25/2024 9 7 - 15 mmol/L Final     Glucose   Date Value Ref Range Status   01/25/2024 111 (H) 70 - 99 mg/dL Final     GLUCOSE BY METER POCT   Date Value Ref Range Status   01/16/2024 93 70 - 99 mg/dL Final     Urea Nitrogen   Date Value Ref Range Status   01/25/2024 30.8 (H) 6.0 - 20.0 mg/dL Final     Creatinine   Date Value Ref Range Status   01/25/2024 2.79 (H) 0.67 - 1.17 mg/dL Final     GFR Estimate   Date Value Ref Range Status   01/25/2024 27 (L) >60 mL/min/1.73m2 Final     Calcium   Date Value Ref Range Status   01/25/2024 8.0 (L) 8.6 - 10.0 mg/dL Final        UA:   Amphetamines Urine   Date Value Ref Range Status   01/14/2024 Screen Negative Screen Negative Final     Comment:     Cutoff for a negative amphetamine is less than 500 ng/mL.     Barbituates Urine   Date Value Ref Range Status   01/14/2024 Screen Negative Screen Negative Final     Comment:     Cutoff for a negative barbiturate is less than 200 ng/mL.     Cannabinoids Urine   Date Value Ref Range Status   01/14/2024 Screen Negative Screen Negative Final     Comment:     Cutoff for a negative cannabinoid is less than 50 ng/mL.     Cocaine Urine   Date Value Ref Range Status   01/14/2024 Screen Negative Screen Negative Final     Comment:     Cutoff for a negative cocaine is less than 300 ng/mL.     Opiates Urine   Date Value Ref Range Status   01/14/2024 Screen Positive (A) Screen Negative Final     Comment:     Cutoff for a positive opiate is 300 ng/mL or greater.  This is an unconfirmed screening result to be used for medical purposes only.      PCP Urine   Date Value Ref Range Status   01/14/2024 Screen Negative Screen Negative Final     Comment:     Cutoff for a negative PCP is less than 25 ng/mL.              MANAGEMENT DISCUSSED with the following over the past 24 hours: RN   NOTE(S)/MEDICAL RECORDS REVIEWED over the past 24 hours: GI, Nursing, Pain Service notes, Nephrology, Hospital Medicine  Medical complexity over the past 24 hours:  - Prescription DRUG MANAGEMENT performed      MORENA Howell, MARIOLA CNS  Acute Inpatient Pain Team  M-F   Paging via [employer] or Desti

## 2024-01-25 NOTE — PROGRESS NOTES
Renal Medicine Progress Note            Assessment/Plan:     48 y.o man with severe hemorrhagic pancreatitis due to biliary stone.      # Severe acute kidney injury: Kidney function is improving.      # Severe hemorrhagic pancreatitis: Improving.               -per GI     # FEN: ++ peripheral edema. Hypokalemia-received KCL. Mild hyponatremia.      # Hypertension:                -metoprolol      Plan:  # Lasix 80 mg IV x 1  # I made a HFUV for him on 2/5 at 130 PM with [facility] ([address], [address]. [phone])     I reviewed notes from GI (Dr. Evans) and IM (Dr. Soares)          Interval History:     He continues to improve.   He tolerates low fat diet.  Good bowel movement  He wants to know if he could go home tomorrow.            Medications and Allergies:      calcium carbonate  500 mg Oral BID    [Held by provider] gabapentin  100 mg Oral BID    lidocaine   Topical 4x Daily    metoprolol tartrate  25 mg Oral BID    nicotine  1 patch Transdermal Daily    nicotine   Transdermal Q8H    pantoprazole  40 mg Intravenous BID    sodium chloride (PF)  3 mL Intracatheter Q8H        Allergies   Allergen Reactions    Peanut-Containing Drug Products     Tree Nuts [Nuts]             Physical Exam:   Vitals were reviewed   , Blood pressure 131/82, pulse 99, temperature 98.1  F (36.7  C), temperature source Oral, resp. rate 18, height 1.829 m (6'), weight 113.7 kg (250 lb 10.6 oz), SpO2 97%.    Wt Readings from Last 3 Encounters:   01/25/24 113.7 kg (250 lb 10.6 oz)       Intake/Output Summary (Last 24 hours) at 1/25/2024 1330  Last data filed at 1/25/2024 1247  Gross per 24 hour   Intake 2080 ml   Output 1650 ml   Net 430 ml     GENERAL APPEARANCE: NAD  HEENT:  Eyes/ears/nose/neck grossly normal  RESP: lungs cta b c good efforts, no crackles, rhonchi or wheezes  CV: RRR, nl S1/S2,  ABDOMEN: pretty distended, semi firm, active bs. Same.   EXTREMITIES/SKIN: no rashes/lesions on observed skin;  ++ edema  NEURO: Awake, alert and conversing normally.         Data:     CBC RESULTS:     Recent Labs   Lab 01/25/24  0647 01/24/24  0613 01/22/24  0645 01/21/24  0648 01/20/24  0549 01/19/24  0538   WBC 13.5* 15.9* 21.0* 22.6* 26.9* 29.2*   RBC 2.61* 2.56* 2.73* 2.75* 2.91* 2.97*   HGB 7.8* 7.6* 8.1* 8.2* 8.6* 8.7*   HCT 22.5* 22.0* 23.5* 23.8* 25.1* 25.2*   * 663* 501* 444 419 389       Basic Metabolic Panel:  Recent Labs   Lab 01/25/24  0647 01/24/24  0613 01/24/24  0029 01/23/24  1740 01/23/24  1020 01/22/24  2333 01/22/24  0645 01/21/24  0648 01/20/24  0549    137  --   --  135  --  135 130* 132*   POTASSIUM 2.9* 3.5 3.4 3.2* 3.3* 3.2* 3.2* 3.5 3.5   CHLORIDE 101 105  --   --  102  --  102 99 99   CO2 25 22  --   --  24  --  23 21* 23   BUN 30.8* 35.1*  --   --  41.6*  --  50.8* 55.0* 55.6*   CR 2.79* 3.07*  --   --  3.29*  --  3.60* 3.59* 3.68*   * 123*  --   --  108*  --  118* 126* 116*   LUBNA 8.0* 7.7*  --   --  8.0*  --  7.8* 7.5* 7.5*       INRNo lab results found in last 7 days.   Attestation:   I have reviewed today's relevant vital signs, notes, medications, labs and imaging.    Kirill Verduzco MD  [facility] - Nephrology  Office phone :[phone]  Pager: [phone]

## 2024-01-25 NOTE — PROGRESS NOTES
GASTROENTEROLOGY PROGRESS NOTE     IMPRESSION:  1.  Pancreatitis-described as hemorrhagic on imaging secondary to gallstones status post ERCP 2024 with sphincterotomy and biliary and pancreas duct stent placement.  Follow-up CT and x-ray do not show pancreas duct stent, this has passed.  - Plan for cholecystectomy as an outpatient.  - Clinically improving - suspect pancreatitis is resolving, WBC improved again today.     2.  Ileus-secondary to pancreatitis.  Patient is now having bowel movements and passing gas.  Tolerated low fiber diet, will advance to regular, low fat diet today.  - Abdominal distention is improving daily, unlikely to have significantly elevated intra-abdominal pressures  - Passing gas and having solid stools now.     3.  Anemia-clinically improved, no abdominal pain, no melena or hematochezia.  Hemoglobin stable today.     4.  Acute renal insufficiency-management per hospitalist and nephrology. Cr improved today.    RECOMMENDATIONS:  - Low fat diet  - Encouraged ambulation  - If ongoing improvement, patient would like to consider going home tomorrow?    Jaxon Evans MD  [facility]  [phone]      ________________________________________________________________________      SUBJECTIVE:  Patient feels better. No abdominal pain. Abdominal distention improved. Passing flatus, having formed BM.        OBJECTIVE:  /80 (BP Location: Left arm)   Pulse 97   Temp 98.2  F (36.8  C) (Oral)   Resp 18   Ht 1.829 m (6')   Wt 113.7 kg (250 lb 10.6 oz)   SpO2 98%   BMI 34.00 kg/m    Temp (24hrs), Av.9  F (36.6  C), Min:97.5  F (36.4  C), Max:98.2  F (36.8  C)    Patient Vitals for the past 72 hrs:   Weight   24 0535 113.7 kg (250 lb 10.6 oz)        PHYSICAL EXAM  GEN: Alert, NAD.    HRT: reg  LUNGS: CTA  ABD: +BS, non-tender, distended, but improved, no rebound or guarding.    Additional Data:  I have reviewed the patient's new clinical lab results:      Recent Labs   Lab Test 01/25/24  0647 01/24/24  0613 01/22/24  0645   WBC 13.5* 15.9* 21.0*   HGB 7.8* 7.6* 8.1*   MCV 86 86 86   * 663* 501*     Recent Labs   Lab Test 01/25/24  0647 01/24/24  0613 01/24/24  0029 01/23/24  1740 01/23/24  1020    137  --   --  135   POTASSIUM 2.9* 3.5 3.4   < > 3.3*   CHLORIDE 101 105  --   --  102   CO2 25 22  --   --  24   BUN 30.8* 35.1*  --   --  41.6*   CR 2.79* 3.07*  --   --  3.29*   ANIONGAP 9 10  --   --  9   LUBNA 8.0* 7.7*  --   --  8.0*   * 123*  --   --  108*    < > = values in this interval not displayed.     Recent Labs   Lab Test 01/25/24  0647 01/24/24  0613 01/22/24  0645 01/21/24  1402 01/14/24  0546 01/13/24  1333   ALBUMIN 2.6* 2.5* 2.7*  --    < > 3.4*   BILITOTAL 0.7 0.8 0.9  --    < > 6.1*   ALT 36 39 36  --    < > 112*   AST 51* 62* 63*  --    < > 133*   PROTEIN  --   --   --  30*  --   --    LIPASE  --   --   --   --   --  776*    < > = values in this interval not displayed.

## 2024-01-25 NOTE — PLAN OF CARE
Date & Time: 1/24/24-1/25/24 7890-1385    Surgery/POD#: POD 10 ERCP for acute gallstone pancreatitis    Behavior & Aggression: GREEN  Fall Risk: no  Orientation: A&Ox4  ABNL VS/O2: VSS ex HTN on RA  ABNL Labs: hgb 7.6, WBC 15.9  Pain Management: PRN PO dilaudid  Bowel/Bladder: Voids in the urinal, no BM this shift  IV: PIV SL   Diet: Low fiber  Activity Level: Ind  Tests/Procedures: n/a  Anticipated DC Date: Pending improvement  Significant Information: No concerns overnight

## 2024-01-25 NOTE — PLAN OF CARE
Date & Time: 1/24 0700-1930   Surgery/POD#: 9 ERCP for acute gallstone pancreatitis  Behavior & Aggression: Green  Fall Risk: No  Orientation: A&Ox4  ABNL VS/O2: VSS ex HTN, self limiting  ABNL Labs: Creat 3.07, Hgb 7.6  Pain Management:Robaxin & dilaudid  Bowel/Bladder: Continent, voiding  Drains: NA  Diet:Low fiber  Activity Level: Ind  Tests/Procedures: NA  Anticipated  DC Date: Pending  Significant Information: GI, nephrology & hospitalist teams following. Pt reporting about 5 loose/watery brown BMs and another 5 or so clear loose/watery BMs. Voiding adequately. Distended, bloated & gas discomfort. Passing gas. DAVIS

## 2024-01-25 NOTE — PROGRESS NOTES
[facility]    Medicine Progress Note - Hospitalist Service    Date of Admission:  1/13/2024    Assessment & Plan   Talha Aleaxnder is a 48-year-old male with no significant past medical history who presented to the ER with a 2-day history of abdominal bloating and was found to have acute gallstone pancreatitis. He was admitted to the hospitalist service for further evaluation and management.    Acute hemorrhagic biliary pancreatitis  Cholelithiasis  Choledocholithiasis with 3 cm CBD dilatation  Elevated LFTs  Ileus  CT abdomen extensive signs of acute pancreatitis, no choledocholithiasis noted but cholelithiasis noted without cholecystitis.   MRCP on 1/13/24 showed choledocholithiasis with distal common bile duct measuring up to 3 mm with mild upstream biliary ductal dilatation.  Acute hemorrhagic pancreatitis with extensive peripancreatic inflammation and fluid as described, cholelithiasis without evidence of acute cholecystitis.  Reactive edema within the proximal duodenum related to pancreatitis.  Admitted to inpatient.  [facility] consulted, appreciate their assistance.  S/p ERCP on 1/14 which showed: choledocholithiasis was found with complete removal with biliary sphincterotomy and biliary stent placed given severe edema; pancreatic stent placed; severe erosive gastropathy and duodenopathy were also found incidentally.  Initially NPO and given IV fluids. Diet subsequently slowly advanced, however he developed abdominal distention on 1/21/24.  Abdominal x-ray on 1/21/24 showed findings consistent with diffuse ileus. Diet downgraded to clear liquids on 1/21/24.  Large BM on 1/22/24 and has continued to have bowel movements since then. Abdomen still distended, but slowly improving. Diet advanced again, up to low fat diet on 1/25/24 and tolerating it well.  Minimize narcotic usage as able.  Encourage ambulation.  IV fluids discontinued on 1/23/24 per nephrology.  General surgery  consulted during this admission, appreciate their assistance. Signed off on 1/19/24, recommend outpatient follow up in one month, consider elective outpatient cholecystectomy after he recovers from his current illness.  Continue inpatient care for now pending continued improvement, consultant recommendations. Possible discharge home in the next couple days.    Positive blood culture, suspect contaminant  Fever  Leukocytosis  Leukocytosis noted upon admission, trended down initially, then trended up. Peaked at 29.1 on 1/18/24.  Fever started on 1/21/24, up to 100.9.  Blood cultures on 1/21/24 with 2/4 bottles positive for Staph epidermidis.  Suspect this was a contaminant.   Empiric Zosyn started on 1/21/24, discontinued on 1/24/24.  Repeat blood cultures on 1/24/24 pending, negative to date.  MRSA nasal swab negative for MRSA.  Bilateral lower extremity Doppler ultrasound negative on 1/22/24 - ordered to exclude DVT as etiology of fever.     Acute kidney injury, non-oliguric  ATN  Hyponatremia  Nonanion gap metabolic acidosis, likely secondary to BRENDA  Creatinine of 2.5 and sodium of 122 at presentation. No previous labs to compare.  Nephrology consulted, appreciate their assistance.  Pancreatitis treated as noted above including IV fluids.  Sodium slowly trended up, within normal limits as of 1/22/24.  Creatinine trend up and peaked at 4.36 on 1/16/24. Creatinine slowly trending down since then, creatinine 2.79 on 1/25/24.  Urine output has been adequate, however documentation likely not accurate since hollingsworth removed.  Ileus and increased intra-abdominal pressure likely contributed to slow recovery, improved now.  IV fluids discontinued on 1/23/24 per nephrology.  IV lasix given on 1/24/24 per nephrology, defer further diuretic dosing to nephrology.  Gabapentin placed on hold by nephrology on 1/22/24.  Recheck labs in AM.    Hypocalcemia  Low when corrected for albumin.  Start TUMS BID.  Recheck in  AM.    Hypokalemia  Replace and recheck per RN managed protocol.  Magnesium within normal limits on 1/25/24.    Acute anemia, likely multifactorial including acute blood loss, dilutional, anemia of inflammation  Erosive gastropathy and duodenopathy  Unknown baseline, however on admission hemoglobin 14.3. Hemoglobin down to 11.8 on 1/14/24, then slowly trending down. CT abdomen/pelvis on 1/17/24 showed extensive acute hemorrhagic pancreatitis. Iron levels low on 1/20/24, but ferritin significantly elevated.  Erosive gastropathy and duodenopathy noted during ERCP on 1/14/24, no active bleeding noted.  Continue BID protonix.  Inflammation and renal disease likely contributing to anemia.  IV venofer given on 1/20 and 1/21 per nephrology.  Hemoglobin down to 7.6 on 1/24/24, then 7.8 on 1/25/24.  Recheck in AM.  Consider CT abdomen/pelvis if hemoglobin trends down further. Hemoglobin stable on 1/25/24 and abdominal symptoms improving, will hold off on imaging for now.    Tobacco use  Briefly counseled on cessation.  Nicotine patch ordered.  PRN nicotrol spray available.          Diet: Low Fat Diet (up to 50g)    DVT Prophylaxis: Pneumatic Compression Devices  Cornejo Catheter: Not present  Lines: None     Cardiac Monitoring: None  Code Status: Full Code      Clinically Significant Risk Factors        # Hypokalemia: Lowest K = 2.9 mmol/L in last 2 days, will replace as needed       # Hypoalbuminemia: Lowest albumin = 2.5 g/dL at 1/24/2024  6:13 AM, will monitor as appropriate            # Obesity: Estimated body mass index is 34 kg/m  as calculated from the following:    Height as of this encounter: 1.829 m (6').    Weight as of this encounter: 113.7 kg (250 lb 10.6 oz).             Disposition Plan     Expected Discharge Date: 01/25/2024,  3:00 PM    Destination: home with family  Discharge Comments: pending diet            Keenan Soares MD  Hospitalist Service  [facility]  Securely message  with [employer] (more info)  Text page via [facility] Paging/Directory   ______________________________________________________________________    Interval History   Talha Alexander was seen this morning. He feels better. Abdomen is uncomfortable, but denies abdominal pain. Had a normal BM today, loose stools seem to have resolved, no melena or hematochezia. No nausea. Tolerating oral intake. Denies fevers, chills, sweats, chest pain, shortness of breath. Ambulating. Hoping to be able to fo home soon.    Physical Exam   Vital Signs: Temp: 98.2  F (36.8  C) Temp src: Oral BP: 129/80 Pulse: 97   Resp: 18 SpO2: 98 % O2 Device: None (Room air)    Weight: 250 lbs 10.61 oz    Constitutional: awake, alert, cooperative, no apparent distress, laying in the hospital bed  Respiratory: no increased work of breathing, clear to auscultation bilaterally, no crackles or wheezing  Cardiovascular: regular rate and rhythm, normal S1 and S2, no murmur noted  GI: bowel sounds positive, more soft, less distended, non-tender  Skin: warm, dry  Musculoskeletal: 2+ lower extremity pitting edema present  Neurologic: awake, alert, oriented, answers questions appropriately, moves all extremities    Medical Decision Making       40 MINUTES SPENT BY ME on the date of service doing chart review, history, exam, documentation & further activities per the note.      Data     I have personally reviewed the following data over the past 24 hrs:    13.5 (H)  \   7.8 (L)   / 718 (H)     135 101 30.8 (H) /  111 (H)   2.9 (L) 25 2.79 (H) \     ALT: 36 AST: 51 (H) AP: 83 TBILI: 0.7   ALB: 2.6 (L) TOT PROTEIN: 5.9 (L) LIPASE: N/A

## 2024-01-26 VITALS
BODY MASS INDEX: 33.47 KG/M2 | HEIGHT: 72 IN | HEART RATE: 97 BPM | DIASTOLIC BLOOD PRESSURE: 89 MMHG | TEMPERATURE: 98.8 F | RESPIRATION RATE: 16 BRPM | OXYGEN SATURATION: 97 % | SYSTOLIC BLOOD PRESSURE: 129 MMHG | WEIGHT: 247.14 LBS

## 2024-01-26 LAB
ALBUMIN SERPL BCG-MCNC: 2.7 G/DL (ref 3.5–5.2)
ALP SERPL-CCNC: 76 U/L (ref 40–150)
ALT SERPL W P-5'-P-CCNC: 34 U/L (ref 0–70)
ANION GAP SERPL CALCULATED.3IONS-SCNC: 9 MMOL/L (ref 7–15)
AST SERPL W P-5'-P-CCNC: 47 U/L (ref 0–45)
BACTERIA BLD CULT: ABNORMAL
BACTERIA BLD CULT: NO GROWTH
BILIRUB SERPL-MCNC: 0.6 MG/DL
BUN SERPL-MCNC: 28 MG/DL (ref 6–20)
CALCIUM SERPL-MCNC: 7.9 MG/DL (ref 8.6–10)
CHLORIDE SERPL-SCNC: 99 MMOL/L (ref 98–107)
CREAT SERPL-MCNC: 2.6 MG/DL (ref 0.67–1.17)
DEPRECATED HCO3 PLAS-SCNC: 26 MMOL/L (ref 22–29)
EGFRCR SERPLBLD CKD-EPI 2021: 30 ML/MIN/1.73M2
ERYTHROCYTE [DISTWIDTH] IN BLOOD BY AUTOMATED COUNT: 14.6 % (ref 10–15)
GLUCOSE SERPL-MCNC: 107 MG/DL (ref 70–99)
HCT VFR BLD AUTO: 24.1 % (ref 40–53)
HGB BLD-MCNC: 8 G/DL (ref 13.3–17.7)
MAGNESIUM SERPL-MCNC: 1.7 MG/DL (ref 1.7–2.3)
MCH RBC QN AUTO: 29.4 PG (ref 26.5–33)
MCHC RBC AUTO-ENTMCNC: 33.2 G/DL (ref 31.5–36.5)
MCV RBC AUTO: 89 FL (ref 78–100)
PLATELET # BLD AUTO: 722 10E3/UL (ref 150–450)
POTASSIUM SERPL-SCNC: 3.3 MMOL/L (ref 3.4–5.3)
PROT SERPL-MCNC: 5.9 G/DL (ref 6.4–8.3)
RBC # BLD AUTO: 2.72 10E6/UL (ref 4.4–5.9)
SODIUM SERPL-SCNC: 134 MMOL/L (ref 135–145)
WBC # BLD AUTO: 12.9 10E3/UL (ref 4–11)

## 2024-01-26 PROCEDURE — 250N000013 HC RX MED GY IP 250 OP 250 PS 637: Performed by: INTERNAL MEDICINE

## 2024-01-26 PROCEDURE — 250N000013 HC RX MED GY IP 250 OP 250 PS 637: Performed by: HOSPITALIST

## 2024-01-26 PROCEDURE — 83735 ASSAY OF MAGNESIUM: CPT | Performed by: HOSPITALIST

## 2024-01-26 PROCEDURE — 99239 HOSP IP/OBS DSCHRG MGMT >30: CPT | Performed by: HOSPITALIST

## 2024-01-26 PROCEDURE — 36415 COLL VENOUS BLD VENIPUNCTURE: CPT | Performed by: INTERNAL MEDICINE

## 2024-01-26 PROCEDURE — 85027 COMPLETE CBC AUTOMATED: CPT | Performed by: INTERNAL MEDICINE

## 2024-01-26 PROCEDURE — 250N000013 HC RX MED GY IP 250 OP 250 PS 637: Performed by: CLINICAL NURSE SPECIALIST

## 2024-01-26 PROCEDURE — 80053 COMPREHEN METABOLIC PANEL: CPT | Performed by: INTERNAL MEDICINE

## 2024-01-26 PROCEDURE — 250N000011 HC RX IP 250 OP 636: Performed by: INTERNAL MEDICINE

## 2024-01-26 PROCEDURE — C9113 INJ PANTOPRAZOLE SODIUM, VIA: HCPCS | Performed by: INTERNAL MEDICINE

## 2024-01-26 RX ORDER — METOPROLOL TARTRATE 25 MG/1
25 TABLET, FILM COATED ORAL 2 TIMES DAILY
Qty: 60 TABLET | Refills: 0 | Status: ON HOLD | OUTPATIENT
Start: 2024-01-26 | End: 2024-08-27

## 2024-01-26 RX ORDER — PANTOPRAZOLE SODIUM 40 MG/1
40 TABLET, DELAYED RELEASE ORAL 2 TIMES DAILY
Qty: 60 TABLET | Refills: 0 | Status: SHIPPED | OUTPATIENT
Start: 2024-01-26 | End: 2024-03-05

## 2024-01-26 RX ORDER — POTASSIUM CHLORIDE 1500 MG/1
40 TABLET, EXTENDED RELEASE ORAL ONCE
Status: COMPLETED | OUTPATIENT
Start: 2024-01-26 | End: 2024-01-26

## 2024-01-26 RX ORDER — METHOCARBAMOL 750 MG/1
750 TABLET, FILM COATED ORAL 3 TIMES DAILY PRN
Qty: 15 TABLET | Refills: 0 | Status: SHIPPED | OUTPATIENT
Start: 2024-01-26 | End: 2024-03-05

## 2024-01-26 RX ORDER — HYDROMORPHONE HYDROCHLORIDE 2 MG/1
2 TABLET ORAL EVERY 4 HOURS PRN
Qty: 12 TABLET | Refills: 0 | Status: SHIPPED | OUTPATIENT
Start: 2024-01-26 | End: 2024-03-05

## 2024-01-26 RX ORDER — ACETAMINOPHEN 325 MG/1
325-650 TABLET ORAL EVERY 6 HOURS PRN
Status: ON HOLD | COMMUNITY
Start: 2024-01-26 | End: 2024-08-27

## 2024-01-26 RX ADMIN — HYDROMORPHONE HYDROCHLORIDE 2 MG: 2 TABLET ORAL at 02:05

## 2024-01-26 RX ADMIN — PANTOPRAZOLE SODIUM 40 MG: 40 INJECTION, POWDER, FOR SOLUTION INTRAVENOUS at 09:41

## 2024-01-26 RX ADMIN — HYDROMORPHONE HYDROCHLORIDE 2 MG: 2 TABLET ORAL at 10:20

## 2024-01-26 RX ADMIN — POTASSIUM CHLORIDE 40 MEQ: 1500 TABLET, EXTENDED RELEASE ORAL at 09:42

## 2024-01-26 RX ADMIN — HYDROMORPHONE HYDROCHLORIDE 2 MG: 2 TABLET ORAL at 06:21

## 2024-01-26 RX ADMIN — CALCIUM CARBONATE (ANTACID) CHEW TAB 500 MG 500 MG: 500 CHEW TAB at 09:41

## 2024-01-26 RX ADMIN — METOPROLOL TARTRATE 25 MG: 25 TABLET, FILM COATED ORAL at 09:42

## 2024-01-26 ASSESSMENT — ACTIVITIES OF DAILY LIVING (ADL)
ADLS_ACUITY_SCORE: 22

## 2024-01-26 NOTE — PROGRESS NOTES
Pt is going home  Please check renal panel Next week.   He is aware of the nephrology HFUV on 2/5/  Discussed case and plan with patient and RN.

## 2024-01-26 NOTE — DISCHARGE SUMMARY
[facility]  Hospitalist Discharge Summary      Date of Admission:  1/13/2024  Date of Discharge:  1/26/2024  Discharging Provider: Keenan Soares MD  Discharge Service: Hospitalist Service    Discharge Diagnoses   Acute hemorrhagic biliary pancreatitis  Cholelithiasis  Choledocholithiasis with 3 cm CBD dilatation  Elevated LFTs  Ileus  Positive blood culture, suspect contaminant  Fever  Leukocytosis  Acute kidney injury, non-oliguric  ATN  Hyponatremia  Nonanion gap metabolic acidosis, likely secondary to BRENDA  Hypocalcemia  Hypokalemia  Acute anemia, likely multifactorial including acute blood loss, dilutional, anemia of inflammation  Erosive gastropathy and duodenopathy  Tobacco use    Clinically Significant Risk Factors     # Obesity: Estimated body mass index is 33.52 kg/m  as calculated from the following:    Height as of this encounter: 1.829 m (6').    Weight as of this encounter: 112.1 kg (247 lb 2.2 oz).       Follow-ups Needed After Discharge   Follow-up Appointments     Follow Up ([facility])      Follow-up with Dr. Kingston on  2/5 at 130 PM with [facility] ([address], [address]. [phone])        Follow-up and recommended labs and tests       We recommend that you schedule an appointment in our surgical office   approximately 1 month after hospital discharge to plan for removal of your   gallbladder once your pancreatitis has had more time to fully resolve.   Call our office at [phone] to schedule this appointment. Our clinic's   name is [facility]. The address is [address] [place] [address], [address], [place], [address]        Follow-up and recommended labs and tests       Follow up with primary care provider within 7 days for hospital follow   up.  The following labs/tests are recommended: CBC and BMP.  Follow up with [facility] in the clinic (the clinic will contact you to   arrange this) and also for an ERCP which is scheduled  on 2/27/24. [facility] Phone #: [phone].  Follow up with nephrology and general surgery as directed.            Unresulted Labs Ordered in the Past 30 Days of this Admission       Date and Time Order Name Status Description    1/24/2024 12:01 AM Blood Culture Arm, Left Preliminary     1/24/2024 12:01 AM Blood Culture Hand, Left Preliminary     1/21/2024 11:32 AM Blood Culture Hand, Left Preliminary         These results will be followed up by Hospitalist Service    Discharge Disposition   Discharged to home  Condition at discharge: Stable    Hospital Course   Talha Alexander is a 48-year-old male with no significant past medical history who presented to the ER with a 2-day history of abdominal bloating and was found to have acute gallstone pancreatitis. He was admitted to the hospitalist service for further evaluation and management.    Acute hemorrhagic biliary pancreatitis  Cholelithiasis  Choledocholithiasis with 3 cm CBD dilatation  Elevated LFTs  Ileus  CT abdomen extensive signs of acute pancreatitis, no choledocholithiasis noted but cholelithiasis noted without cholecystitis.   MRCP on 1/13/24 showed choledocholithiasis with distal common bile duct measuring up to 3 mm with mild upstream biliary ductal dilatation.  Acute hemorrhagic pancreatitis with extensive peripancreatic inflammation and fluid as described, cholelithiasis without evidence of acute cholecystitis.  Reactive edema within the proximal duodenum related to pancreatitis.  Admitted to inpatient.  [facility] consulted, appreciate their assistance.  S/p ERCP on 1/14 which showed: choledocholithiasis was found with complete removal with biliary sphincterotomy and biliary stent placed given severe edema; pancreatic stent placed; severe erosive gastropathy and duodenopathy were also found incidentally.  Initially NPO and given IV fluids. Diet subsequently slowly advanced, however he developed abdominal distention on  1/21/24.  Abdominal x-ray on 1/21/24 showed findings consistent with diffuse ileus. Diet downgraded to clear liquids on 1/21/24.  Large BM on 1/22/24 and has continued to have bowel movements since then. Abdomen still distended, but slowly improving. Diet advanced again, tolerating low fat diet as of 1/25/24.  Symptom management, minimize narcotic usage as able.  Encouraged ambulation.  General surgery consulted during this admission, appreciate their assistance. Signed off on 1/19/24, recommend outpatient follow up in one month, consider elective outpatient cholecystectomy after he recovers from his current illness.  Overall feeling much better. Tolerating low fat diet. Pain controlled with oral medications. Abdominal distention improving. Ambulating. Feels ready for discharge home.  Discharge home today.  Follow up with PCP, GI, and general surgery as noted below.  Discussed reasons to seek medical attention prior to follow up appointments.    Positive blood culture, suspect contaminant  Fever  Leukocytosis  Leukocytosis noted upon admission, trended down initially, then trended up. Peaked at 29.1 on 1/18/24.  Fever started on 1/21/24, up to 100.9.  Blood cultures on 1/21/24 with 2/4 bottles positive for Staph epidermidis.  Suspect this was a contaminant.   Empiric Zosyn started on 1/21/24, discontinued on 1/24/24.  Repeat blood cultures on 1/24/24 pending, negative to date.  MRSA nasal swab negative for MRSA.  Bilateral lower extremity Doppler ultrasound negative on 1/22/24 - ordered to exclude DVT as etiology of fever.     Acute kidney injury, non-oliguric  ATN  Hyponatremia  Nonanion gap metabolic acidosis, likely secondary to BRENDA  Creatinine of 2.5 and sodium of 122 at presentation. No previous labs to compare.  Nephrology consulted, appreciate their assistance.  Pancreatitis treated as noted above including IV fluids.  Sodium slowly trended up, within normal limits as of 1/22/24.  Creatinine trended up  initially and peaked at 4.36 on 1/16/24. Creatinine slowly trending down since then, creatinine 2.60 on 1/26/24.  Urine output has been adequate.  Ileus and increased intra-abdominal pressure likely contributed to slow recovery, although these have improved now.  IV fluids discontinued on 1/23/24 per nephrology.  IV lasix given on 1/24/24 and 1/25/24 per nephrology.  Metoprolol started by nephrology, will continue until outpatient follow up.  Recheck labs at follow up with PCP.  Follow up with Nephrology on 2/5/2 as already arranged.    Hypocalcemia  Mild, should improve now that diet has been advanced.  Recheck at follow up appointment with PCP.    Hypokalemia  Replaced prior to discharge.  Magnesium within normal limits on 1/25/24.    Acute anemia, likely multifactorial including acute blood loss, dilutional, anemia of inflammation  Erosive gastropathy and duodenopathy  Unknown baseline, however on admission hemoglobin 14.3. Hemoglobin down to 11.8 on 1/14/24, then slowly trending down. CT abdomen/pelvis on 1/17/24 showed extensive acute hemorrhagic pancreatitis. Iron levels low on 1/20/24, but ferritin significantly elevated.  Erosive gastropathy and duodenopathy noted during ERCP on 1/14/24, no active bleeding noted.  Continue BID protonix for now until follow up with GI.  Inflammation and renal disease likely contributing to anemia.  IV venofer given on 1/20 and 1/21 per nephrology.  Hemoglobin down to 7.6 on 1/24/24, then up to 7.8 on 1/25/24 and 8.0 on 1/26/24.  Recheck CBC at follow up with PCP.    Tobacco use  Briefly counseled on cessation.  Nicotine patch ordered, PRN nicotrol spray available.    Consultations This Hospital Stay   GASTROENTEROLOGY IP CONSULT  NEPHROLOGY IP CONSULT  SURGERY GENERAL IP CONSULT  PAIN MANAGEMENT ADULT IP CONSULT    Code Status   Full Code    Time Spent on this Encounter   I, Keenan Soares MD, personally saw the patient today and spent greater than 30 minutes discharging  this patient.       Keenan Soares MD  [facility]  [address] [address]  Phone: [phone]  Fax: [phone]  ______________________________________________________________________    Physical Exam   Vital Signs: Temp: 98.8  F (37.1  C) Temp src: Oral BP: 129/89 Pulse: 97   Resp: 16 SpO2: 97 % O2 Device: None (Room air)    Weight: 247 lbs 2.17 oz  Constitutional: awake, alert, cooperative, no apparent distress, sitting up in a chair with his legs elevated  Respiratory: no increased work of breathing, clear to auscultation bilaterally, no crackles or wheezing  Cardiovascular: regular rate and rhythm, normal S1 and S2, no murmur noted  GI: bowel sounds positive, soft, distended but improved, non-tender  Skin: warm, dry  Musculoskeletal: 2+ lower extremity pitting edema present  Neurologic: awake, alert, answers questions appropriately, moves all extremities       Primary Care Physician   Physician No Ref-Primary    Discharge Orders      Follow-up and recommended labs and tests     We recommend that you schedule an appointment in our surgical office approximately 1 month after hospital discharge to plan for removal of your gallbladder once your pancreatitis has had more time to fully resolve. Call our office at [phone] to schedule this appointment. Our clinic's name is [facility]. The address is [address] [place] [address], [address], [place], [place], [address]     Follow Up ([facility])    Follow-up with Dr. Kingston on  2/5 at 130 PM with [facility] ([address], [address]. [phone])     Reason for your hospital stay    Acute hemorrhagic biliary pancreatitis, acute kidney injury     Follow-up and recommended labs and tests     Follow up with primary care provider within 7 days for hospital follow up.  The following labs/tests are recommended: CBC and BMP.  Follow up with [facility] in the clinic (the clinic will contact you  to arrange this) and also for an ERCP which is scheduled on 2/27/24. [facility] Phone #: [phone].  Follow up with nephrology and general surgery as directed.     Activity    Your activity upon discharge: activity as tolerated, avoid strenuous activity until you have recovered from your current illness     Discharge Instructions    No driving while taking Dilaudid or Robaxin.     Diet    Follow this diet upon discharge: Low Fat Diet (up to 50g)     Significant Results and Procedures   Most Recent 3 CBC's:  Recent Labs   Lab Test 01/26/24  0623 01/25/24  0647 01/24/24  0613   WBC 12.9* 13.5* 15.9*   HGB 8.0* 7.8* 7.6*   MCV 89 86 86   * 718* 663*     Most Recent 3 BMP's:  Recent Labs   Lab Test 01/26/24  0623 01/25/24  2137 01/25/24  1548 01/25/24  0647 01/24/24  0613   *  --   --  135 137   POTASSIUM 3.3* 3.5 3.1* 2.9* 3.5   CHLORIDE 99  --   --  101 105   CO2 26  --   --  25 22   BUN 28.0*  --   --  30.8* 35.1*   CR 2.60*  --   --  2.79* 3.07*   ANIONGAP 9  --   --  9 10   LUBNA 7.9*  --   --  8.0* 7.7*   *  --   --  111* 123*     Most Recent 2 LFT's:  Recent Labs   Lab Test 01/26/24 0623 01/25/24 0647   AST 47* 51*   ALT 34 36   ALKPHOS 76 83   BILITOTAL 0.6 0.7     Results for orders placed or performed during the hospital encounter of 01/13/24   US Abdomen Limited    Narrative    EXAM: US ABDOMEN LIMITED  LOCATION: [facility]  DATE: 1/13/2024    INDICATION: RUQ pain  COMPARISON: CT earlier today  TECHNIQUE: Limited abdominal ultrasound.    FINDINGS:    GALLBLADDER: Small shadowing stones and sludge within the gallbladder lumen. No gallbladder wall thickening or pericholecystic fluid. Sonographic Mark sign was negative.    BILE DUCTS: No biliary dilatation. The common duct measures 6 mm.    LIVER: Heterogeneous echogenicity and echotexture with mild hepatic steatosis. No focal mass.    RIGHT KIDNEY: No hydronephrosis.    PANCREAS: The pancreas is  largely obscured by overlying gas.    Small ascites..      Impression    IMPRESSION:  1.  Cholelithiasis.  2.  Mildly heterogeneous liver with mild hepatic steatosis.  3.  Small ascites.  4.  Known pancreatitis was better assessed on the CT earlier today.       CT Abdomen Pelvis w/o Contrast    Narrative    EXAM: CT ABDOMEN PELVIS W/O CONTRAST  LOCATION: [facility]  DATE: 1/13/2024    INDICATION: Abdominal pain.  COMPARISON: None.  TECHNIQUE: CT scan of the abdomen and pelvis was performed without IV contrast. Multiplanar reformats were obtained. Dose reduction techniques were used.  CONTRAST: None.    FINDINGS:     LOWER CHEST: Trace left pleural effusion. Left basilar atelectasis.    HEPATOBILIARY: Cholelithiasis. No biliary ductal dilation. No liver lesions.    PANCREAS: Acute pancreatitis with extensive inflammation and unorganized fluid surrounding the entire pancreas extending into the left upper quadrant, central mesentery, and along the left greater right anterior pararenal fascia. Pockets of unorganized   fluid are present along the greater curvature of the stomach and along the left paracolic gutter. Small amount of ascites is also present around the liver and within the right lower quadrant. No organized peripancreatic fluid collections. No visible   ductal dilation.    SPLEEN: Normal.    ADRENAL GLANDS: Normal.    KIDNEYS/BLADDER: Normal.    BOWEL: No bowel obstruction. Reactive wall thickening of the duodenum. Appendix is not clearly visualized.    LYMPH NODES: No enlarged lymph node.    VASCULATURE: No abdominal aortic aneurysm.    PELVIC ORGANS: Normal.    MUSCULOSKELETAL: Small fat-containing periumbilical hernia. Multilevel degenerative changes of the spine.      Impression    IMPRESSION:     1.  Acute pancreatitis with extensive peripancreatic inflammation as described. No organized peripancreatic fluid collections. This noncontrast exam cannot evaluate for parenchymal  necrosis.    2.  Cholelithiasis. No biliary ductal dilation.    3.  Reactive wall thickening of the duodenum related to the pancreatitis.    4.  Small amount of ascites.    5.  Trace left pleural effusion with adjacent atelectasis.   MR Abdomen MRCP without Contrast    Narrative    EXAM: MR ABDOMEN MRCP W/O CONTRAST  LOCATION: [facility]  DATE: 1/13/2024    INDICATION: Gallstone pancreatitis.  COMPARISON: CT abdomen pelvis 01/13/2024.  TECHNIQUE: Routine MR liver/pancreas protocol including axial and coronal MRCP sequences. 2D and 3D reconstruction performed by MR technologist including MIP reconstruction and slab cholangiograms. If performed with contrast, additional dynamic T1 post   IV contrast images.  CONTRAST: None.     FINDINGS:     LIVER: Noncirrhotic liver morphology. No hepatic steatosis or iron deposition. No focal liver lesions.    GALLBLADDER/BILIARY: Cholelithiasis. No evidence of acute cholecystitis. At least 2 choledocholiths within the common bile duct measuring up to 3 mm (6/#10), with mild dilation of the upstream common bile duct to 8 mm and minimal intrahepatic biliary   ductal dilation.    PANCREAS: Acute hemorrhagic pancreatitis with extensive peripancreatic inflammation and hemorrhagic fluid surrounding the pancreas, extending into the left upper quadrant, and along the left anterior pararenal space and paracolic gutter. Pockets of   unorganized fluid along the greater curvature of the stomach and the left paracolic gutter appear similar to the CT earlier today. A small amount of ascites also remains present around the liver and in the right lower quadrant. No organized   peripancreatic fluid collections. No visible ductal dilation; ductal anatomy is not well defined. This noncontrast exam cannot evaluate for parenchymal necrosis.    SPLEEN: Normal.    ADRENALS: Normal.    KIDNEYS: Normal.    BOWEL: Reactive edema of the proximal duodenum related to the pancreatitis.  Single mildly dilated jejunal loop in the left upper quadrant is probably a focal ileus.    LYMPH NODES: No enlarged lymph nodes.    VASCULATURE: No abdominal aortic aneurysm.    LUNG BASES: Trace left pleural effusion with adjacent atelectasis.    MUSCULOSKELETAL: No acute bony abnormality.       Impression    IMPRESSION:    1.  At least 2 choledocholiths within the distal common bile duct measuring up to 3 mm with mild upstream biliary ductal dilation.    2.  Acute hemorrhagic pancreatitis with extensive peripancreatic inflammation and fluid as described. No organized peripancreatic fluid collections. No pancreatic ductal dilation. This noncontrast exam cannot evaluate for parenchymal necrosis.    3.  Cholelithiasis, without evidence of acute cholecystitis.    4.  Reactive edema within the proximal duodenum related to the pancreatitis. A mildly dilated left upper quadrant jejunal loop is also likely a focal ileus related to the pancreatitis.    5.  Small amount of ascites.    6.  Trace left pleural effusion with adjacent atelectasis.   XR ERCP    Narrative    This exam was marked as non-reportable because it will not be read by a   radiologist or a [facility] non-radiologist provider.         CT Abdomen Pelvis w/o Contrast    Narrative    CT ABDOMEN/PELVIS WITHOUT CONTRAST January 17, 2024 7:52 AM    CLINICAL HISTORY: Followup pancreatitis. Evaluate for pancreatic  necrosis.     TECHNIQUE: CT scan of the abdomen and pelvis was performed without IV  contrast. Multiplanar reformats were obtained. Dose reduction  techniques were used.  CONTRAST: None.    COMPARISON: MR abdomen 1/13/2024.    FINDINGS:   Study limitation: Noncontrast technique.    LOWER CHEST: Trace left pleural effusion with adjacent atelectasis.    HEPATOBILIARY: Cholelithiasis, including probable small stone in the  cystic duct (4/75). Vicariously excreted contrast in the gallbladder.  Trace pneumobilia. Significant biliary ductal dilatation.  No  suspicious liver lesion.     Common bile duct stent extends into the proximal duodenum.    PANCREAS: Nondiagnostic evaluation for pancreatic necrosis. Since  1/13/2024, overall similar extensive acute hemorrhagic pancreatitis  with similar ill-defined fluid extending into the left upper quadrant,  along the left anterior pararenal space, and within the left paracolic  gutter. No thick rim surrounding pockets of fluid (for example along  the greater curvature measuring 7.3 x 6.9 cm) or internal gas to  suggest superinfection.     SPLEEN: Unremarkable.    ADRENAL GLANDS: No significant nodules.    KIDNEYS: No collecting system dilatation. Urinary bladder is  decompressed by Cornejo catheter.    BOWEL: No obstruction. Few prominent right abdominal small bowel loops  may reflect mild ileus. Reactive wall thickening of the distal  transverse colon and proximal duodenum. Pancreatic stent is present  within the ascending colon (4/98).    VASCULATURE: Nonaneurysmal abdominal aorta.    LYMPH NODE AND PERITONEUM: Several prominent peripancreatic lymph  nodes are most likely reactive.    MUSCULOSKELETAL: No aggressive osseous lesion. Increased body wall  edema.    OTHER: None.      Impression    IMPRESSION:   1.  Nondiagnostic evaluation for pancreatic necrosis given noncontrast  technique.  2.  Since 1/13/2024, overall similar extensive acute hemorrhagic  pancreatitis with surrounding ill-defined fluid and pockets of fluid  along the left upper quadrant/stomach greater curvature. No internal  gas to suggest superinfection.  3.  Cholelithiasis with probable stone in the cystic duct. Common bile  duct stent without significant biliary ductal dilatation.  4.  Pancreatic duct stent has migrated, now within the ascending  colon.  5.  Trace left pleural effusion with adjacent atelectasis. Increased  body wall edema.    LING RODRIGUEZ MD         SYSTEM ID:  [id number]   XR Abdomen 2 Views    Narrative    EXAM: XR ABDOMEN 2  VIEWS  LOCATION: [facility]  DATE: 01/21/2024    INDICATION: Distended abdomen, recent pancreatitis.  COMPARISON: None.      Impression    IMPRESSION: Multiple gas dilated small and large bowel loops suggestive of a diffuse ileus. A biliary stent is noted. No free air identified.     US Lower Extremity Venous Duplex Bilateral    Narrative    VENOUS ULTRASOUND BILATERAL LOWER EXTREMITIES  1/22/2024 4:11 PM     HISTORY: Swelling, request made for evaluation of DVT.    COMPARISON: None.    TECHNIQUE: Color Doppler and spectral waveform analysis performed  throughout the deep veins of both lower extremities.    FINDINGS: Both common femoral, proximal great saphenous, femoral, and  popliteal veins demonstrate normal blood flow, compression, and  augmentation. Posterior tibial and peroneal veins are compressible.     VIRGIL PEREIRA MD         SYSTEM ID:  [id number]     Discharge Medications   Current Discharge Medication List        START taking these medications    Details   acetaminophen (TYLENOL) 325 MG tablet Take 1-2 tablets (325-650 mg) by mouth every 6 hours as needed for mild pain    Associated Diagnoses: Acute biliary pancreatitis, unspecified complication status      HYDROmorphone (DILAUDID) 2 MG tablet Take 1 tablet (2 mg) by mouth every 4 hours as needed for moderate pain or severe pain  Qty: 12 tablet, Refills: 0    Associated Diagnoses: Acute biliary pancreatitis, unspecified complication status      methocarbamol (ROBAXIN) 750 MG tablet Take 1 tablet (750 mg) by mouth 3 times daily as needed for muscle spasms  Qty: 15 tablet, Refills: 0    Associated Diagnoses: Acute biliary pancreatitis, unspecified complication status      metoprolol tartrate (LOPRESSOR) 25 MG tablet Take 1 tablet (25 mg) by mouth 2 times daily  Qty: 60 tablet, Refills: 0    Associated Diagnoses: Tachycardia      pantoprazole (PROTONIX) 40 MG EC tablet Take 1 tablet (40 mg) by mouth 2 times daily  Qty: 60 tablet,  Refills: 0    Associated Diagnoses: Gastroesophageal reflux disease with esophagitis, unspecified whether hemorrhage           STOP taking these medications       ibuprofen (ADVIL/MOTRIN) 200 MG tablet Comments:   Reason for Stopping:         naproxen sodium (ANAPROX) 220 MG tablet Comments:   Reason for Stopping:             Allergies   Allergies   Allergen Reactions    Peanut-Containing Drug Products     Tree Nuts [Nuts]

## 2024-01-26 NOTE — PLAN OF CARE
Date & Time: 1/25 0700-1930   Surgery/POD#: 10 ERCP for acute gallstone pancreatitis  Behavior & Aggression: Green  Fall Risk: No  Orientation: A&Ox4  ABNL VS/O2: VSS  ABNL Labs: Creat 2.79, Hgb 7.8, K 2.9 & 3.1 - replaced x2  Pain Management: Robaxin & dilaudid  Bowel/Bladder: Continent, voiding; 3 BMs today  Drains: NA  Diet: Low fat  Activity Level: Ind  Tests/Procedures: NA  Anticipated  DC Date: Possibly tomorrow  Significant Information: GI, nephrology & hospitalist teams following. Distended & bloated. Passing gas. DAVIS

## 2024-01-26 NOTE — PROGRESS NOTES
[facility] Progress Note     Interval History:    Denies abdominal pain. Distention still present, but improving. Passing gas and having formed stools. Tolerating low fat diet. Pt hoping to go home today.   Physical Exam:    /89 (BP Location: Left arm)   Pulse 97   Temp 98.8  F (37.1  C) (Oral)   Resp 16   Ht 1.829 m (6')   Wt 112.1 kg (247 lb 2.2 oz)   SpO2 97%   BMI 33.52 kg/m    Temp (24hrs), Av.5  F (36.9  C), Min:98  F (36.7  C), Max:98.9  F (37.2  C)    Patient Vitals for the past 72 hrs:   Weight   24 0500 112.1 kg (247 lb 2.2 oz)   24 0535 113.7 kg (250 lb 10.6 oz)       Intake/Output Summary (Last 24 hours) at 2024 1041  Last data filed at 2024 1801  Gross per 24 hour   Intake 1280 ml   Output 2300 ml   Net -1020 ml       Constitutional: No acute distress  Cardiovascular: RRR, normal S1/S2  Respiratory: CTA bilaterally  Abdomen: distended but improving, nontender, BS+  Skin: No jaundice      Laboratory Data  Recent Labs   Lab Test 24  0623 24  0647 24  0613   WBC 12.9* 13.5* 15.9*   HGB 8.0* 7.8* 7.6*   MCV 89 86 86   * 718* 663*     Recent Labs   Lab Test 24  0623 24  2137 24  1548 24  0647 24  0613   *  --   --  135 137   POTASSIUM 3.3* 3.5 3.1* 2.9* 3.5   CHLORIDE 99  --   --  101 105   CO2 26  --   --  25 22   BUN 28.0*  --   --  30.8* 35.1*   CR 2.60*  --   --  2.79* 3.07*   ANIONGAP 9  --   --  9 10   LUBNA 7.9*  --   --  8.0* 7.7*     Recent Labs   Lab Test 24  0623 24  0647 24  0613 24  0645 24  1402 24  0528 01/15/24  0548 24  0546 24  1333   ALBUMIN 2.7* 2.6* 2.5* 2.7*  --    < > 2.8*  2.8*   < > 3.4*   BILITOTAL 0.6 0.7 0.8 0.9  --    < > 2.2*   < > 6.1*   DBIL  --   --   --  0.48*  --   --  1.52*  --   --    ALT 34 36 39 36  --    < > 71*   < > 112*   AST 47* 51* 62* 63*  --    < > 94*   < > 133*   ALKPHOS 76 83 83 87  --    < > 73   < > 106   PROTEIN  --    --   --   --  30*  --   --   --   --    LIPASE  --   --   --   --   --   --   --   --  776*    < > = values in this interval not displayed.       Assessment & Plan:  IMPRESSION:  1.  Pancreatitis-described as hemorrhagic on imaging secondary to gallstones status post ERCP January 14, 2024 with sphincterotomy and biliary and pancreas duct stent placement.  Follow-up CT and x-ray do not show pancreas duct stent, this has passed.  - Clinically improving - suspect pancreatitis is resolving, WBC improving     2.  Ileus-secondary to pancreatitis.  Patient is now having bowel movements and passing gas.  Tolerating low fat/low fiber diet   - Abdominal distention is improving daily, unlikely to have significantly elevated intra-abdominal pressures  - Passing gas and having solid stools     3.  Anemia-clinically improved, no abdominal pain, no melena or hematochezia.  Hemoglobin stable    Plan  -continue low fat diet  -no new recommendations  -plan for cholecystectomy as an outpatient  -follow up ERCP scheduled 2/27/24  -we will arrange office visit follow up as well  -ok to discharge from GI perspective. We will sign off. Please call with any concerns or questions.    Will discuss with Dr. Cristina Stewart, [facility]  Cell: [phone] until 12PM  Office: [phone]

## 2024-01-29 LAB
BACTERIA BLD CULT: NO GROWTH
BACTERIA BLD CULT: NO GROWTH

## 2024-02-08 ENCOUNTER — OFFICE VISIT (OUTPATIENT)
Dept: SURGERY | Facility: CLINIC | Age: 49
End: 2024-02-08
Payer: COMMERCIAL

## 2024-02-08 VITALS
HEIGHT: 71 IN | DIASTOLIC BLOOD PRESSURE: 80 MMHG | SYSTOLIC BLOOD PRESSURE: 138 MMHG | HEART RATE: 106 BPM | WEIGHT: 220 LBS | BODY MASS INDEX: 30.8 KG/M2

## 2024-02-08 DIAGNOSIS — K85.90 HEMORRHAGIC PANCREATITIS: Primary | ICD-10-CM

## 2024-02-08 DIAGNOSIS — K85.10 GALLSTONE PANCREATITIS: ICD-10-CM

## 2024-02-08 PROCEDURE — 99214 OFFICE O/P EST MOD 30 MIN: CPT | Performed by: SURGERY

## 2024-02-09 NOTE — PROGRESS NOTES
Talha Alexander is a 48 year old male who was recently in the hospital for severe pancreatitis from 1/13/2024 to 1/26/2024.  The etiology of his pancreatitis was thought to be biliary as common duct stones were removed on an ERCP he had on 1/13/2024.  He now presents for discussion about elective cholecystectomy to prevent this from happening again.    He was pretty sick and his CT during that hospitalization showed severe hemorrhagic pancreatitis with a possible developing pseudocyst.  His abdominal distention has improved greatly, but we are still only 3-4 weeks out from that.    On exam today he has a very firm area in his left upper quadrant which corresponds to the large fluid collection on the CT.    I reviewed his CT with him.  It is too soon to go after his gallbladder as it has a high chance of turning into a surgical misadventure.     I would like to get a CT about 6 weeks out from his hospitalization and I will see him at that time.  If things have reasonably settled down will schedule him for an elective cholecystectomy at that time.    I spent 30 minutes either with the patient, reviewing his available past records, labs, imaging, or discussing with other physicians his care.  Over 50% of the time was spent in direct education of the patient.      Leno Fischer M.D., F.A.C.S.  [facility]  [facility]  [place], [place]

## 2024-02-26 ENCOUNTER — ANESTHESIA EVENT (OUTPATIENT)
Dept: SURGERY | Facility: CLINIC | Age: 49
End: 2024-02-26
Payer: COMMERCIAL

## 2024-02-26 ASSESSMENT — LIFESTYLE VARIABLES: TOBACCO_USE: 1

## 2024-02-26 NOTE — ANESTHESIA PREPROCEDURE EVALUATION
Anesthesia Pre-Procedure Evaluation    Patient: Talha Alexander   MRN: [id number] : 1975        Procedure : Procedure(s):  Endoscopic retrograde cholangiopancreatogram          Past Medical History:   Diagnosis Date    BRENDA (acute kidney injury) (H24)     Anemia due to blood loss, acute     Calculus of gallbladder     Choledocholithiasis     Gallstone pancreatitis     Hypertension     Thrombocytosis       Past Surgical History:   Procedure Laterality Date    ENDOSCOPIC RETROGRADE CHOLANGIOPANCREATOGRAM N/A 2024    Procedure: Endoscopic retrograde cholangiopancreatogram with stent placement;  Surgeon: Carlos Wilson MD;  Location: [facility] OR      Allergies   Allergen Reactions    Peanut-Containing Drug Products Anaphylaxis    Tree Nuts [Nuts]       Social History     Tobacco Use    Smoking status: Every Day     Packs/day: .5     Types: Cigarettes    Smokeless tobacco: Never   Substance Use Topics    Alcohol use: Yes     Comment: NOT CURRENTLY      Wt Readings from Last 1 Encounters:   24 99.8 kg (220 lb)        Labs 2/15/2024  HGB 11.1    K 4.1  Creat 1.14  PT 12.5  Anesthesia Evaluation   Pt has had prior anesthetic.     No history of anesthetic complications       ROS/MED HX  ENT/Pulmonary:     (+)                tobacco use, Current use,                    (-) asthma and sleep apnea   Neurologic:    (-) no seizures, no CVA and migraines   Cardiovascular:     (+)  hypertension- -   -  - -                                   (-) CHF, arrhythmias, irregular heartbeat/palpitations and dyslipidemia   METS/Exercise Tolerance:     Hematologic:     (+)      anemia,          Musculoskeletal:       GI/Hepatic: Comment: Prior ERCP    Hospitalized with hemorrhagic pancreatitis  24 to  2024 for choledocholithiasis     (+)          cholecystitis/cholelithiasis,       (-) GERD and liver disease   Renal/Genitourinary:     (+) renal disease (BRENDA 2024 with peak creatinine 4.26),            "  Endo:    (-) Type II DM and thyroid disease   Psychiatric/Substance Use:       Infectious Disease:       Malignancy:       Other:            Physical Exam    Airway        Mallampati: II   TM distance: > 3 FB   Neck ROM: full   Mouth opening: > 3 cm    Respiratory Devices and Support         Dental       (+) Minor Abnormalities - some fillings, tiny chips      Cardiovascular          Rhythm and rate: regular     Pulmonary           breath sounds clear to auscultation           OUTSIDE LABS:  CBC:   Lab Results   Component Value Date    WBC 12.9 (H) 01/26/2024    WBC 13.5 (H) 01/25/2024    HGB 8.0 (L) 01/26/2024    HGB 7.8 (L) 01/25/2024    HCT 24.1 (L) 01/26/2024    HCT 22.5 (L) 01/25/2024     (H) 01/26/2024     (H) 01/25/2024     BMP:   Lab Results   Component Value Date     (L) 01/26/2024     01/25/2024    POTASSIUM 3.3 (L) 01/26/2024    POTASSIUM 3.5 01/25/2024    CHLORIDE 99 01/26/2024    CHLORIDE 101 01/25/2024    CO2 26 01/26/2024    CO2 25 01/25/2024    BUN 28.0 (H) 01/26/2024    BUN 30.8 (H) 01/25/2024    CR 2.60 (H) 01/26/2024    CR 2.79 (H) 01/25/2024     (H) 01/26/2024     (H) 01/25/2024     COAGS: No results found for: \"PTT\", \"INR\", \"FIBR\"  POC: No results found for: \"BGM\", \"HCG\", \"HCGS\"  HEPATIC:   Lab Results   Component Value Date    ALBUMIN 2.7 (L) 01/26/2024    PROTTOTAL 5.9 (L) 01/26/2024    ALT 34 01/26/2024    AST 47 (H) 01/26/2024    ALKPHOS 76 01/26/2024    BILITOTAL 0.6 01/26/2024    RIAN 14 (L) 01/13/2024     OTHER:   Lab Results   Component Value Date    LACT 1.0 01/21/2024    LUBNA 7.9 (L) 01/26/2024    PHOS 5.3 (H) 01/15/2024    MAG 1.7 01/26/2024    LIPASE 776 (H) 01/13/2024       Anesthesia Plan    ASA Status:  3    NPO Status:  NPO Appropriate    Anesthesia Type: General.     - Airway: ETT   Induction: Intravenous.   Maintenance: Balanced.        Consents    Anesthesia Plan(s) and associated risks, benefits, and realistic alternatives discussed. " "Questions answered and patient/representative(s) expressed understanding.     - Discussed:     - Discussed with:  Patient      - Extended Intubation/Ventilatory Support Discussed: No.      - Patient is DNR/DNI Status: No     Use of blood products discussed: No .     Postoperative Care    Pain management: IV analgesics, Oral pain medications, Multi-modal analgesia.   PONV prophylaxis: Ondansetron (or other 5HT-3), Dexamethasone or Solumedrol     Comments:               Charly Webber, DO    I have reviewed the pertinent notes and labs in the chart from the past 30 days and (re)examined the patient.  Any updates or changes from those notes are reflected in this note.              # Obesity: Estimated body mass index is 30.68 kg/m  as calculated from the following:    Height as of 2/8/24: 1.803 m (5' 11\").    Weight as of 2/8/24: 99.8 kg (220 lb).      "

## 2024-02-27 ENCOUNTER — HOSPITAL ENCOUNTER (OUTPATIENT)
Facility: CLINIC | Age: 49
Discharge: HOME | End: 2024-02-27
Attending: INTERNAL MEDICINE | Admitting: INTERNAL MEDICINE
Payer: COMMERCIAL

## 2024-02-27 ENCOUNTER — APPOINTMENT (OUTPATIENT)
Dept: GENERAL RADIOLOGY | Facility: CLINIC | Age: 49
End: 2024-02-27
Attending: INTERNAL MEDICINE
Payer: COMMERCIAL

## 2024-02-27 ENCOUNTER — ANESTHESIA (OUTPATIENT)
Dept: SURGERY | Facility: CLINIC | Age: 49
End: 2024-02-27
Payer: COMMERCIAL

## 2024-02-27 VITALS
HEART RATE: 75 BPM | OXYGEN SATURATION: 96 % | WEIGHT: 206 LBS | RESPIRATION RATE: 16 BRPM | SYSTOLIC BLOOD PRESSURE: 149 MMHG | BODY MASS INDEX: 28.84 KG/M2 | DIASTOLIC BLOOD PRESSURE: 93 MMHG | TEMPERATURE: 97.4 F | HEIGHT: 71 IN

## 2024-02-27 LAB
ALBUMIN SERPL BCG-MCNC: 4.1 G/DL (ref 3.5–5.2)
ALP SERPL-CCNC: 84 U/L (ref 40–150)
ALT SERPL W P-5'-P-CCNC: 16 U/L (ref 0–70)
ANION GAP SERPL CALCULATED.3IONS-SCNC: 10 MMOL/L (ref 7–15)
AST SERPL W P-5'-P-CCNC: 23 U/L (ref 0–45)
BILIRUB SERPL-MCNC: 0.7 MG/DL
BUN SERPL-MCNC: 8.6 MG/DL (ref 6–20)
CALCIUM SERPL-MCNC: 9.7 MG/DL (ref 8.6–10)
CHLORIDE SERPL-SCNC: 103 MMOL/L (ref 98–107)
CREAT SERPL-MCNC: 0.91 MG/DL (ref 0.67–1.17)
DEPRECATED HCO3 PLAS-SCNC: 28 MMOL/L (ref 22–29)
EGFRCR SERPLBLD CKD-EPI 2021: >90 ML/MIN/1.73M2
ERCP: NORMAL
GLUCOSE SERPL-MCNC: 92 MG/DL (ref 70–99)
POTASSIUM SERPL-SCNC: 4 MMOL/L (ref 3.4–5.3)
PROT SERPL-MCNC: 7.7 G/DL (ref 6.4–8.3)
SODIUM SERPL-SCNC: 141 MMOL/L (ref 135–145)

## 2024-02-27 PROCEDURE — C1726 CATH, BAL DIL, NON-VASCULAR: HCPCS | Performed by: INTERNAL MEDICINE

## 2024-02-27 PROCEDURE — 258N000003 HC RX IP 258 OP 636: Performed by: NURSE ANESTHETIST, CERTIFIED REGISTERED

## 2024-02-27 PROCEDURE — 370N000017 HC ANESTHESIA TECHNICAL FEE, PER MIN: Performed by: INTERNAL MEDICINE

## 2024-02-27 PROCEDURE — 36415 COLL VENOUS BLD VENIPUNCTURE: CPT | Performed by: INTERNAL MEDICINE

## 2024-02-27 PROCEDURE — 999N000141 HC STATISTIC PRE-PROCEDURE NURSING ASSESSMENT: Performed by: INTERNAL MEDICINE

## 2024-02-27 PROCEDURE — 250N000009 HC RX 250: Performed by: NURSE ANESTHETIST, CERTIFIED REGISTERED

## 2024-02-27 PROCEDURE — 43260 ERCP W/SPECIMEN COLLECTION: CPT | Performed by: NURSE ANESTHETIST, CERTIFIED REGISTERED

## 2024-02-27 PROCEDURE — 43260 ERCP W/SPECIMEN COLLECTION: CPT | Performed by: ANESTHESIOLOGY

## 2024-02-27 PROCEDURE — 250N000025 HC SEVOFLURANE, PER MIN: Performed by: INTERNAL MEDICINE

## 2024-02-27 PROCEDURE — 250N000011 HC RX IP 250 OP 636: Performed by: ANESTHESIOLOGY

## 2024-02-27 PROCEDURE — C1769 GUIDE WIRE: HCPCS | Performed by: INTERNAL MEDICINE

## 2024-02-27 PROCEDURE — 74330 X-RAY BILE/PANC ENDOSCOPY: CPT | Mod: TC

## 2024-02-27 PROCEDURE — 82040 ASSAY OF SERUM ALBUMIN: CPT | Performed by: INTERNAL MEDICINE

## 2024-02-27 PROCEDURE — 710N000009 HC RECOVERY PHASE 1, LEVEL 1, PER MIN: Performed by: INTERNAL MEDICINE

## 2024-02-27 PROCEDURE — 250N000011 HC RX IP 250 OP 636: Performed by: NURSE ANESTHETIST, CERTIFIED REGISTERED

## 2024-02-27 PROCEDURE — 250N000009 HC RX 250: Performed by: INTERNAL MEDICINE

## 2024-02-27 PROCEDURE — 710N000012 HC RECOVERY PHASE 2, PER MINUTE: Performed by: INTERNAL MEDICINE

## 2024-02-27 PROCEDURE — 272N000001 HC OR GENERAL SUPPLY STERILE: Performed by: INTERNAL MEDICINE

## 2024-02-27 PROCEDURE — 360N000083 HC SURGERY LEVEL 3 W/ FLUORO, PER MIN: Performed by: INTERNAL MEDICINE

## 2024-02-27 PROCEDURE — 255N000002 HC RX 255 OP 636: Performed by: INTERNAL MEDICINE

## 2024-02-27 RX ORDER — NALOXONE HYDROCHLORIDE 0.4 MG/ML
0.2 INJECTION, SOLUTION INTRAMUSCULAR; INTRAVENOUS; SUBCUTANEOUS
Status: DISCONTINUED | OUTPATIENT
Start: 2024-02-27 | End: 2024-02-27

## 2024-02-27 RX ORDER — MAGNESIUM HYDROXIDE 1200 MG/15ML
LIQUID ORAL PRN
Status: DISCONTINUED | OUTPATIENT
Start: 2024-02-27 | End: 2024-02-27 | Stop reason: HOSPADM

## 2024-02-27 RX ORDER — DEXAMETHASONE SODIUM PHOSPHATE 4 MG/ML
INJECTION, SOLUTION INTRA-ARTICULAR; INTRALESIONAL; INTRAMUSCULAR; INTRAVENOUS; SOFT TISSUE PRN
Status: DISCONTINUED | OUTPATIENT
Start: 2024-02-27 | End: 2024-02-27

## 2024-02-27 RX ORDER — HYDROMORPHONE HCL IN WATER/PF 6 MG/30 ML
0.2 PATIENT CONTROLLED ANALGESIA SYRINGE INTRAVENOUS EVERY 5 MIN PRN
Status: DISCONTINUED | OUTPATIENT
Start: 2024-02-27 | End: 2024-02-27 | Stop reason: HOSPADM

## 2024-02-27 RX ORDER — ONDANSETRON 2 MG/ML
4 INJECTION INTRAMUSCULAR; INTRAVENOUS EVERY 30 MIN PRN
Status: DISCONTINUED | OUTPATIENT
Start: 2024-02-27 | End: 2024-02-27 | Stop reason: HOSPADM

## 2024-02-27 RX ORDER — HYDROMORPHONE HCL IN WATER/PF 6 MG/30 ML
0.4 PATIENT CONTROLLED ANALGESIA SYRINGE INTRAVENOUS EVERY 5 MIN PRN
Status: DISCONTINUED | OUTPATIENT
Start: 2024-02-27 | End: 2024-02-27 | Stop reason: HOSPADM

## 2024-02-27 RX ORDER — NALOXONE HYDROCHLORIDE 0.4 MG/ML
0.4 INJECTION, SOLUTION INTRAMUSCULAR; INTRAVENOUS; SUBCUTANEOUS
Status: DISCONTINUED | OUTPATIENT
Start: 2024-02-27 | End: 2024-02-27 | Stop reason: HOSPADM

## 2024-02-27 RX ORDER — FENTANYL CITRATE 50 UG/ML
INJECTION, SOLUTION INTRAMUSCULAR; INTRAVENOUS PRN
Status: DISCONTINUED | OUTPATIENT
Start: 2024-02-27 | End: 2024-02-27

## 2024-02-27 RX ORDER — INDOMETHACIN 50 MG/1
SUPPOSITORY RECTAL PRN
Status: DISCONTINUED | OUTPATIENT
Start: 2024-02-27 | End: 2024-02-27 | Stop reason: HOSPADM

## 2024-02-27 RX ORDER — SODIUM CHLORIDE, SODIUM LACTATE, POTASSIUM CHLORIDE, CALCIUM CHLORIDE 600; 310; 30; 20 MG/100ML; MG/100ML; MG/100ML; MG/100ML
INJECTION, SOLUTION INTRAVENOUS CONTINUOUS PRN
Status: DISCONTINUED | OUTPATIENT
Start: 2024-02-27 | End: 2024-02-27

## 2024-02-27 RX ORDER — ONDANSETRON 4 MG/1
4 TABLET, ORALLY DISINTEGRATING ORAL EVERY 30 MIN PRN
Status: DISCONTINUED | OUTPATIENT
Start: 2024-02-27 | End: 2024-02-27 | Stop reason: HOSPADM

## 2024-02-27 RX ORDER — LIDOCAINE 40 MG/G
CREAM TOPICAL
Status: DISCONTINUED | OUTPATIENT
Start: 2024-02-27 | End: 2024-02-27

## 2024-02-27 RX ORDER — NALOXONE HYDROCHLORIDE 0.4 MG/ML
0.1 INJECTION, SOLUTION INTRAMUSCULAR; INTRAVENOUS; SUBCUTANEOUS
Status: DISCONTINUED | OUTPATIENT
Start: 2024-02-27 | End: 2024-02-27 | Stop reason: HOSPADM

## 2024-02-27 RX ORDER — LIDOCAINE 40 MG/G
CREAM TOPICAL
Status: DISCONTINUED | OUTPATIENT
Start: 2024-02-27 | End: 2024-02-27 | Stop reason: HOSPADM

## 2024-02-27 RX ORDER — ONDANSETRON 2 MG/ML
4 INJECTION INTRAMUSCULAR; INTRAVENOUS EVERY 6 HOURS PRN
Status: DISCONTINUED | OUTPATIENT
Start: 2024-02-27 | End: 2024-02-27 | Stop reason: HOSPADM

## 2024-02-27 RX ORDER — FLUMAZENIL 0.1 MG/ML
0.2 INJECTION, SOLUTION INTRAVENOUS
Status: DISCONTINUED | OUTPATIENT
Start: 2024-02-27 | End: 2024-02-27

## 2024-02-27 RX ORDER — NALOXONE HYDROCHLORIDE 0.4 MG/ML
0.2 INJECTION, SOLUTION INTRAMUSCULAR; INTRAVENOUS; SUBCUTANEOUS
Status: DISCONTINUED | OUTPATIENT
Start: 2024-02-27 | End: 2024-02-27 | Stop reason: HOSPADM

## 2024-02-27 RX ORDER — NALOXONE HYDROCHLORIDE 0.4 MG/ML
0.4 INJECTION, SOLUTION INTRAMUSCULAR; INTRAVENOUS; SUBCUTANEOUS
Status: DISCONTINUED | OUTPATIENT
Start: 2024-02-27 | End: 2024-02-27

## 2024-02-27 RX ORDER — FENTANYL CITRATE 0.05 MG/ML
50 INJECTION, SOLUTION INTRAMUSCULAR; INTRAVENOUS EVERY 5 MIN PRN
Status: DISCONTINUED | OUTPATIENT
Start: 2024-02-27 | End: 2024-02-27 | Stop reason: HOSPADM

## 2024-02-27 RX ORDER — LABETALOL HYDROCHLORIDE 5 MG/ML
10 INJECTION, SOLUTION INTRAVENOUS
Status: DISCONTINUED | OUTPATIENT
Start: 2024-02-27 | End: 2024-02-27 | Stop reason: HOSPADM

## 2024-02-27 RX ORDER — LIDOCAINE HYDROCHLORIDE 20 MG/ML
INJECTION, SOLUTION INFILTRATION; PERINEURAL PRN
Status: DISCONTINUED | OUTPATIENT
Start: 2024-02-27 | End: 2024-02-27

## 2024-02-27 RX ORDER — FENTANYL CITRATE 0.05 MG/ML
25 INJECTION, SOLUTION INTRAMUSCULAR; INTRAVENOUS EVERY 5 MIN PRN
Status: DISCONTINUED | OUTPATIENT
Start: 2024-02-27 | End: 2024-02-27 | Stop reason: HOSPADM

## 2024-02-27 RX ORDER — SODIUM CHLORIDE, SODIUM LACTATE, POTASSIUM CHLORIDE, CALCIUM CHLORIDE 600; 310; 30; 20 MG/100ML; MG/100ML; MG/100ML; MG/100ML
INJECTION, SOLUTION INTRAVENOUS CONTINUOUS
Status: DISCONTINUED | OUTPATIENT
Start: 2024-02-27 | End: 2024-02-27 | Stop reason: HOSPADM

## 2024-02-27 RX ORDER — ONDANSETRON 2 MG/ML
INJECTION INTRAMUSCULAR; INTRAVENOUS PRN
Status: DISCONTINUED | OUTPATIENT
Start: 2024-02-27 | End: 2024-02-27

## 2024-02-27 RX ORDER — FLUMAZENIL 0.1 MG/ML
0.2 INJECTION, SOLUTION INTRAVENOUS
Status: DISCONTINUED | OUTPATIENT
Start: 2024-02-27 | End: 2024-02-27 | Stop reason: HOSPADM

## 2024-02-27 RX ORDER — PROPOFOL 10 MG/ML
INJECTION, EMULSION INTRAVENOUS PRN
Status: DISCONTINUED | OUTPATIENT
Start: 2024-02-27 | End: 2024-02-27

## 2024-02-27 RX ORDER — ONDANSETRON 4 MG/1
4 TABLET, ORALLY DISINTEGRATING ORAL EVERY 6 HOURS PRN
Status: DISCONTINUED | OUTPATIENT
Start: 2024-02-27 | End: 2024-02-27 | Stop reason: HOSPADM

## 2024-02-27 RX ADMIN — FENTANYL CITRATE 25 MCG: 50 INJECTION, SOLUTION INTRAMUSCULAR; INTRAVENOUS at 15:00

## 2024-02-27 RX ADMIN — MIDAZOLAM 2 MG: 1 INJECTION INTRAMUSCULAR; INTRAVENOUS at 13:22

## 2024-02-27 RX ADMIN — PROPOFOL 200 MG: 10 INJECTION, EMULSION INTRAVENOUS at 13:28

## 2024-02-27 RX ADMIN — ONDANSETRON 4 MG: 2 INJECTION INTRAMUSCULAR; INTRAVENOUS at 13:49

## 2024-02-27 RX ADMIN — DEXAMETHASONE SODIUM PHOSPHATE 4 MG: 4 INJECTION, SOLUTION INTRA-ARTICULAR; INTRALESIONAL; INTRAMUSCULAR; INTRAVENOUS; SOFT TISSUE at 13:49

## 2024-02-27 RX ADMIN — LIDOCAINE HYDROCHLORIDE 80 MG: 20 INJECTION, SOLUTION INFILTRATION; PERINEURAL at 13:28

## 2024-02-27 RX ADMIN — FENTANYL CITRATE 50 MCG: 50 INJECTION INTRAMUSCULAR; INTRAVENOUS at 13:47

## 2024-02-27 RX ADMIN — ROCURONIUM BROMIDE 40 MG: 50 INJECTION, SOLUTION INTRAVENOUS at 13:28

## 2024-02-27 RX ADMIN — PHENYLEPHRINE HYDROCHLORIDE 50 MCG: 10 INJECTION INTRAVENOUS at 14:00

## 2024-02-27 RX ADMIN — SODIUM CHLORIDE, POTASSIUM CHLORIDE, SODIUM LACTATE AND CALCIUM CHLORIDE: 600; 310; 30; 20 INJECTION, SOLUTION INTRAVENOUS at 13:22

## 2024-02-27 RX ADMIN — FENTANYL CITRATE 50 MCG: 50 INJECTION INTRAMUSCULAR; INTRAVENOUS at 13:28

## 2024-02-27 RX ADMIN — FENTANYL CITRATE 25 MCG: 50 INJECTION, SOLUTION INTRAMUSCULAR; INTRAVENOUS at 14:45

## 2024-02-27 RX ADMIN — SUGAMMADEX 200 MG: 100 INJECTION, SOLUTION INTRAVENOUS at 14:10

## 2024-02-27 ASSESSMENT — ACTIVITIES OF DAILY LIVING (ADL)
ADLS_ACUITY_SCORE: 35
ADLS_ACUITY_SCORE: 37

## 2024-02-27 ASSESSMENT — ENCOUNTER SYMPTOMS
DYSRHYTHMIAS: 0
SEIZURES: 0

## 2024-02-27 NOTE — ANESTHESIA PROCEDURE NOTES
Airway       Patient location during procedure: OR ([facility] - Operating Room or Procedural Area)       Procedure Start/Stop Times: 2/27/2024 1:31 PM  Staff -        Anesthesiologist:  Charly Webber DO       CRNA: All Marks APRN CRNA       Performed By: CRNAIndications and Patient Condition       Indications for airway management: rafael-procedural       Induction type:intravenous       Mask difficulty assessment: 2 - vent by mask + OA or adjuvant +/- NMBA    Final Airway Details       Final airway type: endotracheal airway       Successful airway: ETT - single  Endotracheal Airway Details        ETT size (mm): 8.0       Cuffed: yes       Cuff volume (mL): 10       Successful intubation technique: direct laryngoscopy       DL Blade Type: [place] 2       Grade View of Cords: 1       Adjucts: stylet       Position: Right       Measured from: lips       Secured at (cm): 22       Bite block used: None    Post intubation assessment        Number of attempts at approach: 1       Number of other approaches attempted: 0       Secured with: tape       Ease of procedure: easy       Dentition: Intact and Unchanged    Medication(s) Administered   Medication Administration Time: 2/27/2024 1:31 PM

## 2024-02-27 NOTE — DISCHARGE INSTRUCTIONS
** See Endoscopy Report for additional information and Instructions **    *** Your Follow up appointment for tomorrow at [facility] has been cancelled, and does not need to be rescheduled. ***        Same Day Surgery Discharge Instructions for  Sedation and General Anesthesia     It's not unusual to feel dizzy, light-headed or faint for up to 24 hours after surgery or while taking pain medication.  If you have these symptoms: sit for a few minutes before standing and have someone assist you when you get up to walk or use the bathroom.    You should rest and relax for the next 24 hours. We recommend you make arrangements to have an adult stay with you for at least 24 hours after your discharge.  Avoid hazardous and strenuous activity.    DO NOT DRIVE any vehicle or operate mechanical equipment for 24 hours following the end of your surgery.  Even though you may feel normal, your reactions may be affected by the medication you have received.    Do not drink alcoholic beverages for 24 hours following surgery.     Slowly progress to your regular diet as you feel able. It's not unusual to feel nauseated and/or vomit after receiving anesthesia.  If you develop these symptoms, drink clear liquids (apple juice, ginger ale, broth, 7-up, etc. ) until you feel better.  If your nausea and vomiting persists for 24 hours, please notify your surgeon.      All narcotic pain medications, along with inactivity and anesthesia, can cause constipation. Drinking plenty of liquids and increasing fiber intake will help.    For any questions of a medical nature, call your surgeon.    Do not make important decisions for 24 hours.    If you had general anesthesia, you may have a sore throat for a couple of days related to the breathing tube used during surgery.  You may use Cepacol lozenges to help with this discomfort.  If it worsens or if you develop a fever, contact your surgeon.     If you feel your pain is not well managed with the pain  medications prescribed by your surgeon, please contact your surgeon's office to let them know so they can address your concerns.           ** If you have questions or concerns about your procedure,   call Dr. Barajas at [phone] **

## 2024-02-27 NOTE — ANESTHESIA CARE TRANSFER NOTE
Patient: Talha Alexander    Procedure: Procedure(s):  Endoscopic retrograde cholangiopancreatogram, Stent removal,biliary dilation and stone extraction       Diagnosis: Bile duct obstruction (H28) [K83.1]  Diagnosis Additional Information: No value filed.    Anesthesia Type:   General     Note:    Oropharynx: oropharynx clear of all foreign objects and spontaneously breathing  Level of Consciousness: awake  Oxygen Supplementation: room air    Independent Airway: airway patency satisfactory and stable  Dentition: dentition unchanged  Vital Signs Stable: post-procedure vital signs reviewed and stable  Report to RN Given: handoff report given  Patient transferred to: PACU    Handoff Report: Identifed the Patient, Identified the Reponsible Provider, Reviewed the pertinent medical history, Discussed the surgical course, Reviewed Intra-OP anesthesia mangement and issues during anesthesia, Set expectations for post-procedure period and Allowed opportunity for questions and acknowledgement of understanding    Vitals:  Vitals Value Taken Time   /89 02/27/24 1419   Temp     Pulse 83 02/27/24 1421   Resp 9 02/27/24 1421   SpO2 98 % 02/27/24 1421   Vitals shown include unfiled device data.    Electronically Signed By: MORENA Talley CRNA  February 27, 2024  2:22 PM

## 2024-02-28 NOTE — ANESTHESIA POSTPROCEDURE EVALUATION
Patient: Talha Alexander    Procedure: Procedure(s):  Endoscopic retrograde cholangiopancreatogram, Stent removal,biliary dilation and stone extraction       Anesthesia Type:  General    Note:  Disposition: Outpatient   Postop Pain Control: Uneventful            Sign Out: Well controlled pain   PONV: No   Neuro/Psych: Uneventful            Sign Out: Acceptable/Baseline neuro status   Airway/Respiratory: Uneventful            Sign Out: Acceptable/Baseline resp. status   CV/Hemodynamics: Uneventful            Sign Out: Acceptable CV status; No obvious hypovolemia; No obvious fluid overload   Other NRE: NONE   DID A NON-ROUTINE EVENT OCCUR? No           Last vitals:  Vitals Value Taken Time   /102 02/27/24 1515   Temp 36.4  C (97.6  F) 02/27/24 1420   Pulse 75 02/27/24 1520   Resp 12 02/27/24 1520   SpO2 98 % 02/27/24 1521   Vitals shown include unfiled device data.    Electronically Signed By: Charly Webber DO  February 27, 2024  6:08 PM

## 2024-02-29 ENCOUNTER — HOSPITAL ENCOUNTER (OUTPATIENT)
Dept: CT IMAGING | Facility: CLINIC | Age: 49
Discharge: HOME | End: 2024-02-29
Attending: SURGERY | Admitting: SURGERY
Payer: COMMERCIAL

## 2024-02-29 DIAGNOSIS — K85.90 HEMORRHAGIC PANCREATITIS: ICD-10-CM

## 2024-02-29 DIAGNOSIS — K85.10 GALLSTONE PANCREATITIS: ICD-10-CM

## 2024-02-29 LAB
CREAT BLD-MCNC: 0.9 MG/DL (ref 0.7–1.3)
EGFRCR SERPLBLD CKD-EPI 2021: >60 ML/MIN/1.73M2

## 2024-02-29 PROCEDURE — 82565 ASSAY OF CREATININE: CPT

## 2024-02-29 PROCEDURE — 250N000009 HC RX 250: Performed by: SURGERY

## 2024-02-29 PROCEDURE — 250N000011 HC RX IP 250 OP 636: Performed by: SURGERY

## 2024-02-29 PROCEDURE — 71260 CT THORAX DX C+: CPT

## 2024-02-29 RX ORDER — IOPAMIDOL 755 MG/ML
103 INJECTION, SOLUTION INTRAVASCULAR ONCE
Status: COMPLETED | OUTPATIENT
Start: 2024-02-29 | End: 2024-02-29

## 2024-02-29 RX ADMIN — IOPAMIDOL 103 ML: 755 INJECTION, SOLUTION INTRAVENOUS at 07:42

## 2024-02-29 RX ADMIN — SODIUM CHLORIDE 71 ML: 9 INJECTION, SOLUTION INTRAVENOUS at 07:42

## 2024-03-01 ENCOUNTER — TELEPHONE (OUTPATIENT)
Dept: SURGERY | Facility: CLINIC | Age: 49
End: 2024-03-01
Payer: COMMERCIAL

## 2024-03-01 NOTE — TELEPHONE ENCOUNTER
Called and reviewed CT with him.  Large pancreatic pseudocyst.    He is doing ok.  Early satiety which isn't surprising.  I see him next Weds and will review the CT with him.    Leno Fischer M.D., F.A.C.S.  [facility]  [facility]  [place], [place]

## 2024-03-05 ENCOUNTER — HOSPITAL ENCOUNTER (INPATIENT)
Facility: CLINIC | Age: 49
LOS: 2 days | Discharge: HOME | End: 2024-03-07
Attending: EMERGENCY MEDICINE | Admitting: STUDENT IN AN ORGANIZED HEALTH CARE EDUCATION/TRAINING PROGRAM
Payer: COMMERCIAL

## 2024-03-05 ENCOUNTER — APPOINTMENT (OUTPATIENT)
Dept: CT IMAGING | Facility: CLINIC | Age: 49
End: 2024-03-05
Attending: EMERGENCY MEDICINE
Payer: COMMERCIAL

## 2024-03-05 ENCOUNTER — HOSPITAL ENCOUNTER (EMERGENCY)
Facility: CLINIC | Age: 49
Discharge: HOME | End: 2024-03-05

## 2024-03-05 ENCOUNTER — TELEPHONE (OUTPATIENT)
Dept: SURGERY | Facility: CLINIC | Age: 49
End: 2024-03-05

## 2024-03-05 DIAGNOSIS — K80.10 CALCULUS OF GALLBLADDER WITH CHOLECYSTITIS WITHOUT BILIARY OBSTRUCTION, UNSPECIFIED CHOLECYSTITIS ACUITY: ICD-10-CM

## 2024-03-05 DIAGNOSIS — K85.90 ACUTE ON CHRONIC PANCREATITIS (H): ICD-10-CM

## 2024-03-05 DIAGNOSIS — K86.1 ACUTE ON CHRONIC PANCREATITIS (H): ICD-10-CM

## 2024-03-05 DIAGNOSIS — K85.10 ACUTE BILIARY PANCREATITIS, UNSPECIFIED COMPLICATION STATUS: Primary | ICD-10-CM

## 2024-03-05 DIAGNOSIS — R82.90 ABNORMAL URINALYSIS: ICD-10-CM

## 2024-03-05 DIAGNOSIS — K86.3 PANCREATIC PSEUDOCYST: ICD-10-CM

## 2024-03-05 LAB
ALBUMIN SERPL BCG-MCNC: 3.9 G/DL (ref 3.5–5.2)
ALBUMIN UR-MCNC: 30 MG/DL
ALP SERPL-CCNC: 77 U/L (ref 40–150)
ALT SERPL W P-5'-P-CCNC: 23 U/L (ref 0–70)
ANION GAP SERPL CALCULATED.3IONS-SCNC: 10 MMOL/L (ref 7–15)
APPEARANCE UR: CLEAR
AST SERPL W P-5'-P-CCNC: 23 U/L (ref 0–45)
ATRIAL RATE - MUSE: 74 BPM
BASOPHILS # BLD AUTO: 0.1 10E3/UL (ref 0–0.2)
BASOPHILS NFR BLD AUTO: 1 %
BILIRUB SERPL-MCNC: 0.5 MG/DL
BILIRUB UR QL STRIP: NEGATIVE
BUN SERPL-MCNC: 16 MG/DL (ref 6–20)
CALCIUM SERPL-MCNC: 9.5 MG/DL (ref 8.6–10)
CHLORIDE SERPL-SCNC: 104 MMOL/L (ref 98–107)
COLOR UR AUTO: YELLOW
CREAT SERPL-MCNC: 0.94 MG/DL (ref 0.67–1.17)
DEPRECATED HCO3 PLAS-SCNC: 25 MMOL/L (ref 22–29)
DIASTOLIC BLOOD PRESSURE - MUSE: NORMAL MMHG
EGFRCR SERPLBLD CKD-EPI 2021: >90 ML/MIN/1.73M2
EOSINOPHIL # BLD AUTO: 0.6 10E3/UL (ref 0–0.7)
EOSINOPHIL NFR BLD AUTO: 4 %
ERYTHROCYTE [DISTWIDTH] IN BLOOD BY AUTOMATED COUNT: 14.2 % (ref 10–15)
GLUCOSE SERPL-MCNC: 97 MG/DL (ref 70–99)
GLUCOSE UR STRIP-MCNC: NEGATIVE MG/DL
HCT VFR BLD AUTO: 38.6 % (ref 40–53)
HGB BLD-MCNC: 12.3 G/DL (ref 13.3–17.7)
HGB UR QL STRIP: NEGATIVE
HYALINE CASTS: 3 /LPF
IMM GRANULOCYTES # BLD: 0.1 10E3/UL
IMM GRANULOCYTES NFR BLD: 1 %
INTERPRETATION ECG - MUSE: NORMAL
KETONES UR STRIP-MCNC: NEGATIVE MG/DL
LEUKOCYTE ESTERASE UR QL STRIP: NEGATIVE
LIPASE SERPL-CCNC: 265 U/L (ref 13–60)
LYMPHOCYTES # BLD AUTO: 1.5 10E3/UL (ref 0.8–5.3)
LYMPHOCYTES NFR BLD AUTO: 10 %
MCH RBC QN AUTO: 28 PG (ref 26.5–33)
MCHC RBC AUTO-ENTMCNC: 31.9 G/DL (ref 31.5–36.5)
MCV RBC AUTO: 88 FL (ref 78–100)
MONOCYTES # BLD AUTO: 0.9 10E3/UL (ref 0–1.3)
MONOCYTES NFR BLD AUTO: 6 %
MUCOUS THREADS #/AREA URNS LPF: PRESENT /LPF
NEUTROPHILS # BLD AUTO: 12.3 10E3/UL (ref 1.6–8.3)
NEUTROPHILS NFR BLD AUTO: 78 %
NITRATE UR QL: NEGATIVE
NRBC # BLD AUTO: 0 10E3/UL
NRBC BLD AUTO-RTO: 0 /100
P AXIS - MUSE: 78 DEGREES
PH UR STRIP: 6 [PH] (ref 5–7)
PLATELET # BLD AUTO: 352 10E3/UL (ref 150–450)
POTASSIUM SERPL-SCNC: 4.3 MMOL/L (ref 3.4–5.3)
PR INTERVAL - MUSE: 132 MS
PROT SERPL-MCNC: 6.8 G/DL (ref 6.4–8.3)
QRS DURATION - MUSE: 76 MS
QT - MUSE: 396 MS
QTC - MUSE: 439 MS
R AXIS - MUSE: 85 DEGREES
RBC # BLD AUTO: 4.39 10E6/UL (ref 4.4–5.9)
RBC URINE: 1 /HPF
SODIUM SERPL-SCNC: 139 MMOL/L (ref 135–145)
SP GR UR STRIP: 1.02 (ref 1–1.03)
SYSTOLIC BLOOD PRESSURE - MUSE: NORMAL MMHG
T AXIS - MUSE: 76 DEGREES
UROBILINOGEN UR STRIP-MCNC: NORMAL MG/DL
VENTRICULAR RATE- MUSE: 74 BPM
WBC # BLD AUTO: 15.4 10E3/UL (ref 4–11)
WBC URINE: 12 /HPF

## 2024-03-05 PROCEDURE — 85025 COMPLETE CBC W/AUTO DIFF WBC: CPT | Performed by: EMERGENCY MEDICINE

## 2024-03-05 PROCEDURE — 74177 CT ABD & PELVIS W/CONTRAST: CPT

## 2024-03-05 PROCEDURE — 99222 1ST HOSP IP/OBS MODERATE 55: CPT | Performed by: PHYSICIAN ASSISTANT

## 2024-03-05 PROCEDURE — 250N000009 HC RX 250: Performed by: EMERGENCY MEDICINE

## 2024-03-05 PROCEDURE — 250N000011 HC RX IP 250 OP 636: Performed by: EMERGENCY MEDICINE

## 2024-03-05 PROCEDURE — 258N000003 HC RX IP 258 OP 636: Performed by: PHYSICIAN ASSISTANT

## 2024-03-05 PROCEDURE — 93005 ELECTROCARDIOGRAM TRACING: CPT

## 2024-03-05 PROCEDURE — 120N000001 HC R&B MED SURG/OB

## 2024-03-05 PROCEDURE — 96361 HYDRATE IV INFUSION ADD-ON: CPT

## 2024-03-05 PROCEDURE — 258N000003 HC RX IP 258 OP 636: Performed by: EMERGENCY MEDICINE

## 2024-03-05 PROCEDURE — 36415 COLL VENOUS BLD VENIPUNCTURE: CPT | Performed by: EMERGENCY MEDICINE

## 2024-03-05 PROCEDURE — 87086 URINE CULTURE/COLONY COUNT: CPT | Performed by: EMERGENCY MEDICINE

## 2024-03-05 PROCEDURE — C9113 INJ PANTOPRAZOLE SODIUM, VIA: HCPCS | Performed by: PHYSICIAN ASSISTANT

## 2024-03-05 PROCEDURE — 250N000011 HC RX IP 250 OP 636: Performed by: PHYSICIAN ASSISTANT

## 2024-03-05 PROCEDURE — 80053 COMPREHEN METABOLIC PANEL: CPT | Performed by: EMERGENCY MEDICINE

## 2024-03-05 PROCEDURE — 83690 ASSAY OF LIPASE: CPT | Performed by: EMERGENCY MEDICINE

## 2024-03-05 PROCEDURE — 87040 BLOOD CULTURE FOR BACTERIA: CPT | Performed by: PHYSICIAN ASSISTANT

## 2024-03-05 PROCEDURE — 99285 EMERGENCY DEPT VISIT HI MDM: CPT | Mod: 25

## 2024-03-05 PROCEDURE — 81001 URINALYSIS AUTO W/SCOPE: CPT | Performed by: EMERGENCY MEDICINE

## 2024-03-05 PROCEDURE — 36415 COLL VENOUS BLD VENIPUNCTURE: CPT | Performed by: PHYSICIAN ASSISTANT

## 2024-03-05 PROCEDURE — 96375 TX/PRO/DX INJ NEW DRUG ADDON: CPT

## 2024-03-05 PROCEDURE — 96374 THER/PROPH/DIAG INJ IV PUSH: CPT | Mod: 59

## 2024-03-05 RX ORDER — ONDANSETRON 2 MG/ML
4 INJECTION INTRAMUSCULAR; INTRAVENOUS
Status: DISCONTINUED | OUTPATIENT
Start: 2024-03-05 | End: 2024-03-05

## 2024-03-05 RX ORDER — ACETAMINOPHEN 325 MG/1
650 TABLET ORAL EVERY 4 HOURS PRN
Status: DISCONTINUED | OUTPATIENT
Start: 2024-03-05 | End: 2024-03-07 | Stop reason: HOSPADM

## 2024-03-05 RX ORDER — CALCIUM CARBONATE 500 MG/1
1000 TABLET, CHEWABLE ORAL 4 TIMES DAILY PRN
Status: DISCONTINUED | OUTPATIENT
Start: 2024-03-05 | End: 2024-03-07 | Stop reason: HOSPADM

## 2024-03-05 RX ORDER — PIPERACILLIN SODIUM, TAZOBACTAM SODIUM 3; .375 G/15ML; G/15ML
3.38 INJECTION, POWDER, LYOPHILIZED, FOR SOLUTION INTRAVENOUS EVERY 6 HOURS
Status: DISCONTINUED | OUTPATIENT
Start: 2024-03-05 | End: 2024-03-07 | Stop reason: HOSPADM

## 2024-03-05 RX ORDER — AMOXICILLIN 250 MG
1 CAPSULE ORAL 2 TIMES DAILY PRN
Status: DISCONTINUED | OUTPATIENT
Start: 2024-03-05 | End: 2024-03-07 | Stop reason: HOSPADM

## 2024-03-05 RX ORDER — AMOXICILLIN 250 MG
2 CAPSULE ORAL 2 TIMES DAILY PRN
Status: DISCONTINUED | OUTPATIENT
Start: 2024-03-05 | End: 2024-03-07 | Stop reason: HOSPADM

## 2024-03-05 RX ORDER — NALOXONE HYDROCHLORIDE 0.4 MG/ML
0.2 INJECTION, SOLUTION INTRAMUSCULAR; INTRAVENOUS; SUBCUTANEOUS
Status: DISCONTINUED | OUTPATIENT
Start: 2024-03-05 | End: 2024-03-07 | Stop reason: HOSPADM

## 2024-03-05 RX ORDER — HYDROMORPHONE HYDROCHLORIDE 1 MG/ML
0.3 INJECTION, SOLUTION INTRAMUSCULAR; INTRAVENOUS; SUBCUTANEOUS
Status: DISCONTINUED | OUTPATIENT
Start: 2024-03-05 | End: 2024-03-07 | Stop reason: HOSPADM

## 2024-03-05 RX ORDER — ONDANSETRON 4 MG/1
4 TABLET, ORALLY DISINTEGRATING ORAL EVERY 6 HOURS PRN
Status: DISCONTINUED | OUTPATIENT
Start: 2024-03-05 | End: 2024-03-07 | Stop reason: HOSPADM

## 2024-03-05 RX ORDER — ACETAMINOPHEN 650 MG/1
650 SUPPOSITORY RECTAL EVERY 4 HOURS PRN
Status: DISCONTINUED | OUTPATIENT
Start: 2024-03-05 | End: 2024-03-07 | Stop reason: HOSPADM

## 2024-03-05 RX ORDER — SALIVA STIMULANT COMB. NO.3
1 SPRAY, NON-AEROSOL (ML) MUCOUS MEMBRANE
Status: DISCONTINUED | OUTPATIENT
Start: 2024-03-05 | End: 2024-03-07 | Stop reason: HOSPADM

## 2024-03-05 RX ORDER — SODIUM CHLORIDE, SODIUM LACTATE, POTASSIUM CHLORIDE, CALCIUM CHLORIDE 600; 310; 30; 20 MG/100ML; MG/100ML; MG/100ML; MG/100ML
INJECTION, SOLUTION INTRAVENOUS CONTINUOUS
Status: DISCONTINUED | OUTPATIENT
Start: 2024-03-05 | End: 2024-03-07 | Stop reason: HOSPADM

## 2024-03-05 RX ORDER — IOPAMIDOL 755 MG/ML
99 INJECTION, SOLUTION INTRAVASCULAR ONCE
Status: COMPLETED | OUTPATIENT
Start: 2024-03-05 | End: 2024-03-05

## 2024-03-05 RX ORDER — ONDANSETRON 2 MG/ML
4 INJECTION INTRAMUSCULAR; INTRAVENOUS EVERY 6 HOURS PRN
Status: DISCONTINUED | OUTPATIENT
Start: 2024-03-05 | End: 2024-03-07 | Stop reason: HOSPADM

## 2024-03-05 RX ORDER — NALOXONE HYDROCHLORIDE 0.4 MG/ML
0.4 INJECTION, SOLUTION INTRAMUSCULAR; INTRAVENOUS; SUBCUTANEOUS
Status: DISCONTINUED | OUTPATIENT
Start: 2024-03-05 | End: 2024-03-07 | Stop reason: HOSPADM

## 2024-03-05 RX ORDER — LIDOCAINE 40 MG/G
CREAM TOPICAL
Status: DISCONTINUED | OUTPATIENT
Start: 2024-03-05 | End: 2024-03-07 | Stop reason: HOSPADM

## 2024-03-05 RX ORDER — HYDROMORPHONE HYDROCHLORIDE 1 MG/ML
0.5 INJECTION, SOLUTION INTRAMUSCULAR; INTRAVENOUS; SUBCUTANEOUS
Status: DISCONTINUED | OUTPATIENT
Start: 2024-03-05 | End: 2024-03-07 | Stop reason: HOSPADM

## 2024-03-05 RX ORDER — HYDROMORPHONE HYDROCHLORIDE 1 MG/ML
0.5 INJECTION, SOLUTION INTRAMUSCULAR; INTRAVENOUS; SUBCUTANEOUS
Status: COMPLETED | OUTPATIENT
Start: 2024-03-05 | End: 2024-03-05

## 2024-03-05 RX ORDER — METOPROLOL TARTRATE 25 MG/1
25 TABLET, FILM COATED ORAL 2 TIMES DAILY
Status: DISCONTINUED | OUTPATIENT
Start: 2024-03-05 | End: 2024-03-07 | Stop reason: HOSPADM

## 2024-03-05 RX ADMIN — IOPAMIDOL 99 ML: 755 INJECTION, SOLUTION INTRAVENOUS at 12:50

## 2024-03-05 RX ADMIN — HYDROMORPHONE HYDROCHLORIDE 0.5 MG: 1 INJECTION, SOLUTION INTRAMUSCULAR; INTRAVENOUS; SUBCUTANEOUS at 21:15

## 2024-03-05 RX ADMIN — SODIUM CHLORIDE 1000 ML: 9 INJECTION, SOLUTION INTRAVENOUS at 11:52

## 2024-03-05 RX ADMIN — PIPERACILLIN AND TAZOBACTAM 3.38 G: 3; .375 INJECTION, POWDER, FOR SOLUTION INTRAVENOUS at 22:08

## 2024-03-05 RX ADMIN — PIPERACILLIN AND TAZOBACTAM 3.38 G: 3; .375 INJECTION, POWDER, FOR SOLUTION INTRAVENOUS at 17:28

## 2024-03-05 RX ADMIN — HYDROMORPHONE HYDROCHLORIDE 0.5 MG: 1 INJECTION, SOLUTION INTRAMUSCULAR; INTRAVENOUS; SUBCUTANEOUS at 18:05

## 2024-03-05 RX ADMIN — HYDROMORPHONE HYDROCHLORIDE 0.5 MG: 1 INJECTION, SOLUTION INTRAMUSCULAR; INTRAVENOUS; SUBCUTANEOUS at 15:40

## 2024-03-05 RX ADMIN — SODIUM CHLORIDE 68 ML: 9 INJECTION, SOLUTION INTRAVENOUS at 12:49

## 2024-03-05 RX ADMIN — HYDROMORPHONE HYDROCHLORIDE 0.5 MG: 1 INJECTION, SOLUTION INTRAMUSCULAR; INTRAVENOUS; SUBCUTANEOUS at 11:51

## 2024-03-05 RX ADMIN — PANTOPRAZOLE SODIUM 40 MG: 40 INJECTION, POWDER, FOR SOLUTION INTRAVENOUS at 17:25

## 2024-03-05 RX ADMIN — SODIUM CHLORIDE, POTASSIUM CHLORIDE, SODIUM LACTATE AND CALCIUM CHLORIDE: 600; 310; 30; 20 INJECTION, SOLUTION INTRAVENOUS at 18:15

## 2024-03-05 RX ADMIN — ONDANSETRON 4 MG: 2 INJECTION INTRAMUSCULAR; INTRAVENOUS at 11:49

## 2024-03-05 ASSESSMENT — COLUMBIA-SUICIDE SEVERITY RATING SCALE - C-SSRS
2. HAVE YOU ACTUALLY HAD ANY THOUGHTS OF KILLING YOURSELF IN THE PAST MONTH?: NO
6. HAVE YOU EVER DONE ANYTHING, STARTED TO DO ANYTHING, OR PREPARED TO DO ANYTHING TO END YOUR LIFE?: NO
1. IN THE PAST MONTH, HAVE YOU WISHED YOU WERE DEAD OR WISHED YOU COULD GO TO SLEEP AND NOT WAKE UP?: NO

## 2024-03-05 ASSESSMENT — ACTIVITIES OF DAILY LIVING (ADL)
ADLS_ACUITY_SCORE: 37
WALKING_OR_CLIMBING_STAIRS_DIFFICULTY: NO
WEAR_GLASSES_OR_BLIND: YES
VISION_MANAGEMENT: EYE GLASS
DIFFICULTY_COMMUNICATING: NO
ADLS_ACUITY_SCORE: 37
ADLS_ACUITY_SCORE: 37
DRESSING/BATHING_DIFFICULTY: NO
ADLS_ACUITY_SCORE: 22
DOING_ERRANDS_INDEPENDENTLY_DIFFICULTY: NO
FALL_HISTORY_WITHIN_LAST_SIX_MONTHS: NO
CONCENTRATING,_REMEMBERING_OR_MAKING_DECISIONS_DIFFICULTY: NO
ADLS_ACUITY_SCORE: 37
ADLS_ACUITY_SCORE: 22
ADLS_ACUITY_SCORE: 37
ADLS_ACUITY_SCORE: 22
ADLS_ACUITY_SCORE: 37
TOILETING_ISSUES: NO
ADLS_ACUITY_SCORE: 22
ADLS_ACUITY_SCORE: 37
CHANGE_IN_FUNCTIONAL_STATUS_SINCE_ONSET_OF_CURRENT_ILLNESS/INJURY: NO
ADLS_ACUITY_SCORE: 22
ADLS_ACUITY_SCORE: 37
DIFFICULTY_EATING/SWALLOWING: NO

## 2024-03-05 NOTE — ED TRIAGE NOTES
Pt BIBA from home. Pt ate breakfast this AM and tried to have BM and began to have severe upper abd pain. Pt has hx of gallstones which resulted in pancreatitis. Pt was feeling ok until this morning. Pt began to have dizziness and nausea so took ambulance to ED.   Pt feels abd is distended from baseline     Triage Assessment (Adult)       Row Name 03/05/24 1101          Triage Assessment    Airway WDL WDL        Respiratory WDL    Respiratory WDL WDL        Skin Circulation/Temperature WDL    Skin Circulation/Temperature WDL WDL        Cardiac WDL    Cardiac WDL WDL        Peripheral/Neurovascular WDL    Peripheral Neurovascular WDL WDL        Cognitive/Neuro/Behavioral WDL    Cognitive/Neuro/Behavioral WDL WDL

## 2024-03-05 NOTE — PHARMACY-ADMISSION MEDICATION HISTORY
Pharmacist Admission Medication History    Admission medication history is complete. The information provided in this note is only as accurate as the sources available at the time of the update.    Information Source(s): Patient and CareEverywhere/SureScripts via in-person    Changes made to PTA medication list:  Added: None  Deleted: None  Changed: None      Medication History Completed By: Arely Au RPH 3/5/2024 12:17 PM    PTA Med List   Medication Sig Last Dose    acetaminophen (TYLENOL) 325 MG tablet Take 1-2 tablets (325-650 mg) by mouth every 6 hours as needed for mild pain PRN    metoprolol tartrate (LOPRESSOR) 25 MG tablet Take 1 tablet (25 mg) by mouth 2 times daily 3/5/2024

## 2024-03-05 NOTE — TELEPHONE ENCOUNTER
Patient's partner called to let Dr. Fischer know that Talha is on the way to the hopital with severe abdominal pain. Will cancel appointment with Dr. Fischer tomorrow if no longer needs the appointment.    [phone]  Ok to leave VM

## 2024-03-05 NOTE — ED PROVIDER NOTES
"History   Chief Complaint:  Abdominal Pain     HPI   Talha Alexander is a 48 year old male who presents with epigastric tenderness. Patient reports his pain onset this morning in the shower after consuming an Ensure protein shake, which progressively got worse shortly after getting out of the shower. He notes that when he went downstairs post-shower, he became extremely dizzy with associated nausea, which ultimately prompted his presentation to the ED this afternoon. He felt at baseline yesterday. Girlfriend at bedside notes he looks more distended than usual today. No jaundice, vomiting, cough, or fevers. He is a tobacco user, but does not consume alcohol. He recently had an ERCP done on 2/27 (details below), but states today's pain feels different than his previous choledocholithiasis/gallstones. Last bowel movement was this morning, which was overall normal without blood. No pain medications at home.     Independent Historian:   Girlfriend at bedside, who reports that he looks more distended than usual.    Review of External Notes:   I reviewed the CT from 2/29. Large pancreatic pseudocyst noted. ERCP on 2/27 with ODALIS, per chart review. Biliary stent removal on this same day.      Medications:    Dilaudid  Robaxin  Metoprolol  Protonix    Past Medical History:    BRENDA  Anemia   Gallstones  Choledocholithiasis   Hypertension  Thrombocytosis    Past Surgical History:    Endoscopic retrograde   Cholangiopancreatogram (x2)     Physical Exam   Patient Vitals for the past 24 hrs:   BP Temp Temp src Pulse Resp SpO2 Height Weight   03/05/24 1641 119/80 97.6  F (36.4  C) Oral 96 16 97 % -- --   03/05/24 1616 -- -- -- -- -- 97 % -- --   03/05/24 1539 113/82 -- -- 91 -- 96 % -- --   03/05/24 1230 123/78 -- -- 76 -- 98 % -- --   03/05/24 1200 106/71 -- -- 73 -- 99 % -- --   03/05/24 1130 108/68 -- -- 76 -- 100 % -- --   03/05/24 1056 104/86 98.2  F (36.8  C) Oral 75 18 99 % 1.803 m (5' 11\") 89.4 kg (197 lb)      Physical " Exam  General: Somewhat uncomfortable but nontoxic-appearing male semirecumbent in room 11, girlfriend at bedside  HENT: mucous membranes slightly dry  CV: rate as above, no murmur audible, no lower extremity edema  Resp: normal effort, speaks in full phrases, no stridor, no cough observed  GI: Abdomen soft, moderately protuberant, moderate epigastric tenderness with negative Mark sign, no discrete masses palpable  MSK: no bony tenderness, no CVAT  Skin: appropriately warm and dry, no jaundice  Neuro: alert, clear speech, oriented   Psych: cooperative, no hallucinations    Emergency Department Course   ECG  ECG results from 03/05/24   EKG 12 lead     Value    Systolic Blood Pressure     Diastolic Blood Pressure     Ventricular Rate 74    Atrial Rate 74    NY Interval 132    QRS Duration 76        QTc 439    P Axis 78    R AXIS 85    T Axis 76    Interpretation ECG      Sinus rhythm  Read by: Dr. Alan Sexton MD       Imaging:  CT Abdomen Pelvis w Contrast   Final Result   IMPRESSION:    1.  Increased upper abdominal ill-defined ascites suggesting acute on   chronic pancreatitis.   2.  Evolving necrotizing pancreatitis with similar nonenhancing   pancreatic body and interval decreased in size surrounding walled-off   necrosis (17.8 x 7.5 cm, previously 19.2 x 10.4 cm).   3.  Cholelithiasis.      LING RODRIGUEZ MD            SYSTEM ID:  [id number]      Report per radiology    Laboratory:  Labs Ordered and Resulted from Time of ED Arrival to Time of ED Departure   LIPASE - Abnormal       Result Value    Lipase 265 (*)    ROUTINE UA WITH MICROSCOPIC - Abnormal    Color Urine Yellow      Appearance Urine Clear      Glucose Urine Negative      Bilirubin Urine Negative      Ketones Urine Negative      Specific Gravity Urine 1.023      Blood Urine Negative      pH Urine 6.0      Protein Albumin Urine 30 (*)     Urobilinogen Urine Normal      Nitrite Urine Negative      Leukocyte Esterase Urine  Negative      Mucus Urine Present (*)     RBC Urine 1      WBC Urine 12 (*)     Hyaline Casts Urine 3 (*)    CBC WITH PLATELETS AND DIFFERENTIAL - Abnormal    WBC Count 15.4 (*)     RBC Count 4.39 (*)     Hemoglobin 12.3 (*)     Hematocrit 38.6 (*)     MCV 88      MCH 28.0      MCHC 31.9      RDW 14.2      Platelet Count 352      % Neutrophils 78      % Lymphocytes 10      % Monocytes 6      % Eosinophils 4      % Basophils 1      % Immature Granulocytes 1      NRBCs per 100 WBC 0      Absolute Neutrophils 12.3 (*)     Absolute Lymphocytes 1.5      Absolute Monocytes 0.9      Absolute Eosinophils 0.6      Absolute Basophils 0.1      Absolute Immature Granulocytes 0.1      Absolute NRBCs 0.0     COMPREHENSIVE METABOLIC PANEL - Normal    Sodium 139      Potassium 4.3      Carbon Dioxide (CO2) 25      Anion Gap 10      Urea Nitrogen 16.0      Creatinine 0.94      GFR Estimate >90      Calcium 9.5      Chloride 104      Glucose 97      Alkaline Phosphatase 77      AST 23      ALT 23      Protein Total 6.8      Albumin 3.9      Bilirubin Total 0.5     URINE CULTURE   BLOOD CULTURE   BLOOD CULTURE      Interventions:  Medications   metoprolol tartrate (LOPRESSOR) tablet 25 mg (has no administration in time range)   lidocaine 1 % 0.1-1 mL (has no administration in time range)   lidocaine (LMX4) cream (has no administration in time range)   sodium chloride (PF) 0.9% PF flush 3 mL (3 mLs Intracatheter $Given 3/5/24 1729)   sodium chloride (PF) 0.9% PF flush 3 mL (has no administration in time range)   senna-docusate (SENOKOT-S/PERICOLACE) 8.6-50 MG per tablet 1 tablet (has no administration in time range)     Or   senna-docusate (SENOKOT-S/PERICOLACE) 8.6-50 MG per tablet 2 tablet (has no administration in time range)   calcium carbonate (TUMS) chewable tablet 1,000 mg (has no administration in time range)   acetaminophen (TYLENOL) tablet 650 mg (has no administration in time range)     Or   acetaminophen (TYLENOL)  Suppository 650 mg (has no administration in time range)   HYDROmorphone (PF) (DILAUDID) injection 0.3 mg (has no administration in time range)   HYDROmorphone (PF) (DILAUDID) injection 0.5 mg (0.5 mg Intravenous $Given 3/5/24 1805)   ondansetron (ZOFRAN ODT) ODT tab 4 mg (has no administration in time range)     Or   ondansetron (ZOFRAN) injection 4 mg (has no administration in time range)   artificial saliva (BIOTENE MT) solution 1 spray (has no administration in time range)   lactated ringers infusion (has no administration in time range)   pantoprazole (PROTONIX) IV push injection 40 mg (40 mg Intravenous $Given 3/5/24 1725)   piperacillin-tazobactam (ZOSYN) 3.375 g vial to attach to  mL bag (3.375 g Intravenous $New Bag 3/5/24 1728)   naloxone (NARCAN) injection 0.2 mg (has no administration in time range)     Or   naloxone (NARCAN) injection 0.4 mg (has no administration in time range)     Or   naloxone (NARCAN) injection 0.2 mg (has no administration in time range)     Or   naloxone (NARCAN) injection 0.4 mg (has no administration in time range)   sodium chloride 0.9% BOLUS 1,000 mL (0 mLs Intravenous Stopped 3/5/24 1449)   HYDROmorphone (PF) (DILAUDID) injection 0.5 mg (0.5 mg Intravenous $Given 3/5/24 1540)   Saline flush (68 mLs Intravenous $Given 3/5/24 1249)   iopamidol (ISOVUE-370) solution 99 mL (99 mLs Intravenous $Given 3/5/24 1250)      Independent Interpretation (X-rays, CTs, rhythm strip):  None    Assessments/Consultations/Discussion of Management or Tests:  ED Course as of 03/05/24 1809   Tue Mar 05, 2024   1124 I evaluated the patient.    1414 Rechecked and updated.    1444 C/w Madelyn Fuentes PA-C, who accepts for Dr. Wyman.      Social Determinants of Health affecting care:   None    Disposition:  The patient was admitted to the hospital under the care of Dr. Wyman.     Impression & Plan    Medical Decision Making:  Differential diagnosis included recurrent pancreatitis as well as  internal hemorrhage, cholecystitis, perforated viscus and many others.  The patient and I agreed to pursue CT imaging in light of his recent complex history.  He was treated with multiple doses of IV opioids with some improvement but not resolution of his pain.  There are no immediately surgical findings though I do think that hospitalization for close monitoring and further care is indicated.  He would likely benefit from GI and general surgery consultations as well.  No clear bacterial infection is identified or highly suspected so antibiotics were deferred.  IV fluids given.  Findings and tentative plan discussed with patient who agrees with it.  Urinalysis slightly abnormal but no focal urinary symptoms, do not suspect primary  process such as pyelonephritis or ureteral stone.    Diagnosis:    ICD-10-CM    1. Acute on chronic pancreatitis (H)  K85.90     K86.1       2. Pancreatic pseudocyst  K86.3       3. Calculus of gallbladder with cholecystitis without biliary obstruction, unspecified cholecystitis acuity  K80.10       4. Abnormal urinalysis  R82.90             Scribe Disclosure:  I, KACI COLEMAN, am serving as a scribe at 11:13 AM on 3/5/2024 to document services personally performed by Alan Livingston Md based on my observations and the provider's statements to me.     3/5/2024   Alan Livingston MD Reitsema, Jeffrey Alan, MD  03/05/24 5809

## 2024-03-05 NOTE — CONSULTS
[facility] GASTROENTEROLOGY CONSULTATION     Talha Alexander  [address]  [place] [address]  48 year old male    Admission Date/Time: 3/5/2024  Primary Care Provider: Jemma Burch    We were asked to see the patient in consultation by Dr. Fuentes for evaluation of abdominal pain in patient with recent history of severe gallstone pancreatitis.        HPI:  Talha Alexander is a 48 year old male who presented to [facility] 3/5/24 with acute onset of abdominal pain.    His history is significant for severe pancreatitis, he was admitted to the hospital 1/13/24-1/26/24.  CT at that time showed severe hemorrhagic pancreatitis with a possible developing pseudocyst.  The etiology of his pancreatitis is thought to be gallstones. He underwent ERCP 1/14/24 where choledocholithiasis was found, sphincterotomy performed and biliary stent placed due to severe edema.  He was noted to have erosive gastropathy and duodenopathy.    ERCP repeated 2/27/24 with balloon sphincteroplasty as well as stent removal.    CT  2/29/24 with evolving changes of necrotizing pancreatitis, large area of walled off necrosis v pseudocyst.    Patient reports feeling well. He does smoke but does not drink alcohol.  No THC.  Was following a low fat diet.     This morning he had a protein shake and then developed sharp, midline abdominal pain. He states the pain was so severe he felt dizzy.  He had not felt pain like that since his initial admission to the hospital. No nausea or vomiting. Denies constipation and diarrhea.      ROS: A comprehensive ten point review of systems was negative aside from those in mentioned in the HPI.      MEDICATIONS: No current facility-administered medications on file prior to encounter.  acetaminophen (TYLENOL) 325 MG tablet, Take 1-2 tablets (325-650 mg) by mouth every 6 hours as needed for mild pain  metoprolol tartrate (LOPRESSOR) 25 MG tablet, Take 1 tablet (25 mg) by mouth 2 times  "daily        ALLERGIES:   Allergies   Allergen Reactions    Peanut-Containing Drug Products Anaphylaxis    Tree Nuts [Nuts]        Past Medical History:   Diagnosis Date    BRENDA (acute kidney injury) (H24)     Anemia due to blood loss, acute     Calculus of gallbladder     Choledocholithiasis     Gallstone pancreatitis     Hypertension     Thrombocytosis        Past Surgical History:   Procedure Laterality Date    ENDOSCOPIC RETROGRADE CHOLANGIOPANCREATOGRAM N/A 1/14/2024    Procedure: Endoscopic retrograde cholangiopancreatogram with stent placement;  Surgeon: Carlos Wilson MD;  Location:  OR    ENDOSCOPIC RETROGRADE CHOLANGIOPANCREATOGRAM N/A 2/27/2024    Procedure: Endoscopic retrograde cholangiopancreatogram, Stent removal,biliary dilation and stone extraction;  Surgeon: Avani Barajas MD;  Location:  OR         SOCIAL HISTORY:  Social History     Tobacco Use    Smoking status: Every Day     Packs/day: .5     Types: Cigarettes    Smokeless tobacco: Never   Substance Use Topics    Alcohol use: Yes     Comment: NOT CURRENTLY    Drug use: Never       FAMILY HISTORY:  Non contributory    PHYSICAL EXAM:   /82   Pulse 91   Temp 98.2  F (36.8  C) (Oral)   Resp 18   Ht 1.803 m (5' 11\")   Wt 89.4 kg (197 lb)   SpO2 96%   BMI 27.48 kg/m      Constitutional: NAD, comfortable  Cardiovascular: RRR,  Respiratory: CTAB  Psychiatric: mentation appears normal and affect normal/bright  Head: Normocephalic. Atraumatic.    Neck: N normal size, trachea midline  Eyes:  no icterus  ENT:  hearing adequate  Abdomen:   Mildly distended, mild tenderness to palpation but no rebound or guarding. No bruising or rash.  NEURO: grossly negative  SKIN: no suspicious lesions or rashes            ADDITIONAL COMMENTS:   I reviewed the patient's new clinical lab test results.   Recent Labs   Lab Test 03/05/24  1147 01/26/24  0623 01/25/24  0647   WBC 15.4* 12.9* 13.5*   HGB 12.3* 8.0* 7.8*   MCV 88 89 86    " 722* 718*     Recent Labs   Lab Test 03/05/24  1147 02/29/24  0738 02/27/24  1123 01/26/24  0623     --  141 134*   POTASSIUM 4.3  --  4.0 3.3*   CHLORIDE 104  --  103 99   CO2 25  --  28 26   BUN 16.0  --  8.6 28.0*   CR 0.94 0.9 0.91 2.60*   ANIONGAP 10  --  10 9   LUBNA 9.5  --  9.7 7.9*   GLC 97  --  92 107*     Recent Labs   Lab Test 03/05/24  1232 03/05/24  1147 02/27/24  1123 01/26/24  0623 01/22/24  0645 01/21/24  1402 01/14/24  0546 01/13/24  1333   ALBUMIN  --  3.9 4.1 2.7*   < >  --    < > 3.4*   BILITOTAL  --  0.5 0.7 0.6   < >  --    < > 6.1*   ALT  --  23 16 34   < >  --    < > 112*   AST  --  23 23 47*   < >  --    < > 133*   ALKPHOS  --  77 84 76   < >  --    < > 106   PROTEIN 30*  --   --   --   --  30*  --   --    LIPASE  --  265*  --   --   --   --   --  776*    < > = values in this interval not displayed.       3/5/24 CT:  1.  Increased upper abdominal ill-defined ascites suggesting acute on  chronic pancreatitis.  2.  Evolving necrotizing pancreatitis with similar nonenhancing  pancreatic body and interval decreased in size surrounding walled-off  necrosis (17.8 x 7.5 cm, previously 19.2 x 10.4 cm).  3.  Cholelithiasis.    CONSULTATION ASSESSMENT AND PLAN:    Principal Problem:    Abdominal pain    Pancreatic pseudocyst    Assessment: Admitted 1/13/24-1/26/24 with severe pancreatitis, s/p ERCP with CBD stone removal and sphincterotomy and stent placement 1/14/24.  ERCP 2/27/24 with removal of stent and balloon sphincteroplasty to remove debris and dilate the biliary pancreatic junction due to mild stenosis of the biliary orifice. He had appointment next week for consideration of endoscopic cystgastrostomy.   Was feeling well and tolerating low fat diet until this morning.     CT 3/5/24 with some increased ascites which could be causing pain. This could be post ERCP pancreatitis but it has been a week since the ERCP so that seems unlikely.  LFTs stable, no evidence of common bile duct  obstruction from labs.  Fluid collection slightly smaller but WBC up today.    Patient states pain feeling a little better now than earlier and he is feeling hungry.      Plan:  - ice chips ok, NPO at midnight in case procedure needed  - follow LFTS and CBC  - I will discuss with biliary provider  - EMILY, PPI      Glory Henao MD  [facility]  Office:  [phone]    Approximately 45 minutes of total time was spent providing patient care, including patient evaluation, reviewing documentation/test results, and .

## 2024-03-05 NOTE — ED NOTES
[facility]  ED Nurse Handoff Report    ED Chief complaint: Abdominal Pain      ED Diagnosis:   Final diagnoses:   Acute on chronic pancreatitis (H)   Pancreatic pseudocyst   Calculus of gallbladder with cholecystitis without biliary obstruction, unspecified cholecystitis acuity       Code Status: Admitting MD to discuss    Allergies:   Allergies   Allergen Reactions    Peanut-Containing Drug Products Anaphylaxis    Tree Nuts [Nuts]        Patient Story: Pt BIBA from home. Pt ate breakfast this AM and tried to have BM and began to have severe upper abd pain. Pt has hx of gallstones which resulted in pancreatitis. Pt was feeling ok until this morning. Pt began to have dizziness and nausea so took ambulance to ED.   Pt feels abd is distended from baseline  Focused Assessment:  A & Ox4. Pain managed with dilaudid.     Labs Ordered and Resulted from Time of ED Arrival to Time of ED Departure   LIPASE - Abnormal       Result Value    Lipase 265 (*)    ROUTINE UA WITH MICROSCOPIC - Abnormal    Color Urine Yellow      Appearance Urine Clear      Glucose Urine Negative      Bilirubin Urine Negative      Ketones Urine Negative      Specific Gravity Urine 1.023      Blood Urine Negative      pH Urine 6.0      Protein Albumin Urine 30 (*)     Urobilinogen Urine Normal      Nitrite Urine Negative      Leukocyte Esterase Urine Negative      Mucus Urine Present (*)     RBC Urine 1      WBC Urine 12 (*)     Hyaline Casts Urine 3 (*)    CBC WITH PLATELETS AND DIFFERENTIAL - Abnormal    WBC Count 15.4 (*)     RBC Count 4.39 (*)     Hemoglobin 12.3 (*)     Hematocrit 38.6 (*)     MCV 88      MCH 28.0      MCHC 31.9      RDW 14.2      Platelet Count 352      % Neutrophils 78      % Lymphocytes 10      % Monocytes 6      % Eosinophils 4      % Basophils 1      % Immature Granulocytes 1      NRBCs per 100 WBC 0      Absolute Neutrophils 12.3 (*)     Absolute Lymphocytes 1.5      Absolute Monocytes 0.9      Absolute  "Eosinophils 0.6      Absolute Basophils 0.1      Absolute Immature Granulocytes 0.1      Absolute NRBCs 0.0     COMPREHENSIVE METABOLIC PANEL - Normal    Sodium 139      Potassium 4.3      Carbon Dioxide (CO2) 25      Anion Gap 10      Urea Nitrogen 16.0      Creatinine 0.94      GFR Estimate >90      Calcium 9.5      Chloride 104      Glucose 97      Alkaline Phosphatase 77      AST 23      ALT 23      Protein Total 6.8      Albumin 3.9      Bilirubin Total 0.5     URINE CULTURE     CT Abdomen Pelvis w Contrast   Preliminary Result   IMPRESSION:    1.  Increased upper abdominal ill-defined ascites suggesting acute on   chronic pancreatitis.   2.  Evolving necrotizing pancreatitis with similar nonenhancing   pancreatic body and interval decreased in size surrounding walled-off   necrosis (17.8 x 7.5 cm, previously 19.2 x 10.4 cm).   3.  Cholelithiasis.              To be done/followed up on inpatient unit:  Admitting MD orders    Does this patient have any cognitive concerns?:  none    Activity level - Baseline/Home:  Independent  Activity Level - Current:   Independent    Patient's Preferred language: English   Needed?: No    Isolation: None  Infection: Not Applicable  Patient tested for COVID 19 prior to admission: NO  Bariatric?: No    Vital Signs:   Vitals:    03/05/24 1056 03/05/24 1130 03/05/24 1200 03/05/24 1230   BP: 104/86 108/68 106/71 123/78   Pulse: 75 76 73 76   Resp: 18      Temp: 98.2  F (36.8  C)      TempSrc: Oral      SpO2: 99% 100% 99% 98%   Weight: 89.4 kg (197 lb)      Height: 1.803 m (5' 11\")          Cardiac Rhythm:     Was the PSS-3 completed:   Yes  What interventions are required if any?               Family Comments: None present  OBS brochure/video discussed/provided to patient/family: No         For the majority of the shift this patient's behavior was Green.   Behavioral interventions performed were Frequent rounding.    ED NURSE PHONE NUMBER: *[phone]         "

## 2024-03-05 NOTE — H&P
[facility]    History and Physical - Hospitalist Service       Date of Admission:  3/5/2024    Assessment & Plan      Talha Alexander is a 48 year old male with a past medical history significant for HTN and recent admission for acute hemorrhagic biliary pancreatitis complicated by ATN and ileus admitted on 3/5/2024 after presenting with severe abdominal pain found to have acute on chronic pancreatitis.     Abdominal pain, possible acute on chronic pancreatitis   Pancreatic necrosis   Cholelithiasis  Erosive gastropathy and duodenopathy  Patient was recently admitted at [facility] from 1/13/2024 through 1/26/2024 for acute hemorrhagic biliary pancreatitis and choledocholithiasis.  He underwent ERCP with stent placement at that time.  Hospital course was also complicated by BRENDA, acute anemia, and ileus.  Since discharge she has been tolerating a regular diet without significant abdominal pain, vomiting, any fevers or chills. Reports eating large fajita meal evening prior to admission, no ETOH.  *has been following with Gen Surg outpatient for timing of jim  *Presents with abrupt onset severe epigastric abdominal pain  *LFTs are within normal limits, lipase 265 (776 last admission)  *WBC 15.4 (12.9)  *CT on admission shows increased upper abdominal ill defined ascites suggesting acute on chronic pancreatitis. Evolving necrotizing pancreatitis with interval decreased size in surrounding walled-off necrosis  VSS in the ED, afebrile  --admit inpatient  --[facility] consulted, discussed with provider on admission. Will add abx and keep on ice chips for now  --NPO except ice chips  --trend CBC, LFT, lipase in am   --LR @125/hr overnight   --start empiric zosyn for now   --resume daily PPI for now (stopped when one month prescription ran out post discharge)    Anemia  Improving. Hgb at time of discharge 8> 12.3 on admission.   --monitor     Tobacco use  Declines nicotine  "replacement    HTN  Continue PTA metoprolol with hold parameters        Diet:  NPO except ice chips   DVT Prophylaxis: Pneumatic Compression Devices, likely will start lovenox tomorrow pending GI plan   Cornejo Catheter: Not present  Lines: None     Cardiac Monitoring: None  Code Status:  FULL CODE    Clinically Significant Risk Factors Present on Admission                       # Overweight: Estimated body mass index is 27.48 kg/m  as calculated from the following:    Height as of this encounter: 1.803 m (5' 11\").    Weight as of this encounter: 89.4 kg (197 lb).              Disposition Plan      Expected Discharge Date: 03/07/2024                The patient's care was discussed with Dr. Wyman who agrees with the above plan     Madelyn Fuentes PA-C  Hospitalist Service  [facility]  Securely message with [employer] (more info)  Text page via [employer] Paging/Directory     ______________________________________________________________________    Chief Complaint   Abdominal pain     History is obtained from the patient and his wife who are present at bedside     History of Present Illness   Talha Alexander is a 48 year old male with a past medical history significant for HTN and recent admission for acute hemorrhagic biliary pancreatitis complicated by ATN and ileus admitted on 3/5/2024 after presenting with severe abdominal pain found to have acute on chronic pancreatitis.   Patient was recently admitted at [facility] 1/13/2024 through 1/26/2024 after presenting with acute gallstone pancreatitis with subsequent imaging showing hemorrhagic pancreatitis.  He was followed by [facility] and underwent ERCP with biliary and pancreatic stent placement.  He was also found to have severe erosive gastropathy and duodenopathy incidentally.  His hospital course was otherwise complicated by BRENDA caused by ATN with a creatinine peak of 4 as well as an ileus.  Additionally he developed " multifactorial anemia with hemoglobin in the 8 range at time of discharge.  He reports since discharge he has been generally doing well.  Has been tolerating regular diet.  He met with general surgery in clinic last week and had a CT scan which showed a large area of walled off necrosis with final surgical plan still pending.  He reports the evening prior to admission he had a large fajita dinner and initially felt fine.  On the morning of admission shortly after eating breakfast he developed severe acute onset epigastric abdominal pain with associated nausea and dizziness.  EMS was called he was brought to the ED.  Laboratory evaluation is notable for unremarkable LFTs and a lipase in the 200s.  His white count is a bit higher than time of discharge at 15.  He is afebrile with stable vital signs but continues to have severe epigastric abdominal pain and worsened abdominal distention.  He currently rates his pain about a 7 out of 10.  Denies nausea or vomiting.  He has had no recent bleeding, hematochezia, melena.  He has not been taking any NSAIDs.  No fevers or chills.  No urinary symptoms.  His creatinine has since normalized since his discharge.  He has been off his PPI since he ran out of the 30-day prescription and did not believe he was supposed to continue taking this.  He started on a protein shake supplement the day prior to admission.  His last bowel movement was the morning of admission and was normal.  He is no longer feeling dizzy.    Past Medical History    Past Medical History:   Diagnosis Date    BRENDA (acute kidney injury) (H24)     Anemia due to blood loss, acute     Calculus of gallbladder     Choledocholithiasis     Gallstone pancreatitis     Hypertension     Thrombocytosis        Past Surgical History   Past Surgical History:   Procedure Laterality Date    ENDOSCOPIC RETROGRADE CHOLANGIOPANCREATOGRAM N/A 1/14/2024    Procedure: Endoscopic retrograde cholangiopancreatogram with stent placement;   Surgeon: Carlos Wilson MD;  Location:  OR    ENDOSCOPIC RETROGRADE CHOLANGIOPANCREATOGRAM N/A 2/27/2024    Procedure: Endoscopic retrograde cholangiopancreatogram, Stent removal,biliary dilation and stone extraction;  Surgeon: Avani Barajas MD;  Location:  OR       Prior to Admission Medications   Prior to Admission Medications   Prescriptions Last Dose Informant Patient Reported? Taking?   acetaminophen (TYLENOL) 325 MG tablet PRN  No Yes   Sig: Take 1-2 tablets (325-650 mg) by mouth every 6 hours as needed for mild pain   metoprolol tartrate (LOPRESSOR) 25 MG tablet 3/5/2024  No Yes   Sig: Take 1 tablet (25 mg) by mouth 2 times daily      Facility-Administered Medications: None        Social History   I have reviewed this patient's social history and updated it with pertinent information if needed.  Social History     Tobacco Use    Smoking status: Every Day     Packs/day: .5     Types: Cigarettes    Smokeless tobacco: Never   Substance Use Topics    Alcohol use: Yes     Comment: NOT CURRENTLY    Drug use: Never        Physical Exam   Temp: 98.2  F (36.8  C) Temp src: Oral BP: 113/82 Pulse: 91   Resp: 18 SpO2: 96 % O2 Device: None (Room air)    Vitals:    03/05/24 1056   Weight: 89.4 kg (197 lb)     Vital Signs with Ranges  Temp:  [98.2  F (36.8  C)] 98.2  F (36.8  C)  Pulse:  [73-91] 91  Resp:  [18] 18  BP: (104-123)/(68-86) 113/82  SpO2:  [96 %-100 %] 96 %  No intake/output data recorded.    Constitutional: Alert and oriented, sitting up in bed.  Non toxic appearing. Appropriately conversant. Appears mildly uncomfortable due to pain   ENT:  moist mucous membranes  Respiratory: Lungs with occasional mild expiratory wheeze, no increased work of breathing   Cardiovascular: Regular rate and rhythm, no LE edema   GI: positive bowel sounds. Abdomen is moderately distended. Tender to palpation across upper abdomen with guarding. No rigidity. Umbilical hernia  Skin/Integumen: warm, dry  MSK:   moves all four extremities. Normal tone   Neuro:  speech is clear. Face symmetric. Follows commands       Medical Decision Making       60 MINUTES SPENT BY ME on the date of service doing chart review, history, exam, documentation & further activities per the note.      Data     I have personally reviewed the following data over the past 24 hrs:    15.4 (H)  \   12.3 (L)   / 352     139 104 16.0 /  97   4.3 25 0.94 \     ALT: 23 AST: 23 AP: 77 TBILI: 0.5   ALB: 3.9 TOT PROTEIN: 6.8 LIPASE: 265 (H)       Imaging results reviewed over the past 24 hrs:   Recent Results (from the past 24 hour(s))   CT Abdomen Pelvis w Contrast    Narrative    CT ABDOMEN AND PELVIS WITH CONTRAST 3/5/2024 1:11 PM    CLINICAL HISTORY: Recurrent upper abdominal pain, known pancreatic  pseudocyst.    TECHNIQUE: CT scan of the abdomen and pelvis was performed following  injection of IV contrast. Multiplanar reformats were obtained. Dose  reduction techniques were used.  CONTRAST: 99 mL Isovue-[id number]    COMPARISON: CT chest, abdomen and pelvis 2/29/2024.    FINDINGS:   LOWER CHEST: No infiltrates or effusions.    HEPATOBILIARY: No liver lesion. Cholelithiasis.    PANCREAS: Continued evolving necrotizing pancreatitis with similar  nonenhancing portion of the pancreatic body (3/80). Decreased in size  walled-off necrosis overall measuring 17.8 x 7.5 cm, previously 19.2 x  10.4 cm, which extends into the mesentery.    New/increased ill-defined fluid within the left upper quadrant and  perihepatic spaces. No new organized drainable fluid collection.    SPLEEN: Normal size.    ADRENAL GLANDS: No significant nodules.    KIDNEYS/BLADDER: No significant mass, stones, or hydronephrosis.    BOWEL: No obstruction or inflammatory change.    PELVIC ORGANS: No pelvic masses.    ADDITIONAL FINDINGS: Small volume pelvic ascites. Nonaneurysmal  abdominal aorta. Mild atherosclerosis. Portal veins are patent.  Similar diminutive splenic vein.     MUSCULOSKELETAL:  Unremarkable.      Impression    IMPRESSION:   1.  Increased upper abdominal ill-defined ascites suggesting acute on  chronic pancreatitis.  2.  Evolving necrotizing pancreatitis with similar nonenhancing  pancreatic body and interval decreased in size surrounding walled-off  necrosis (17.8 x 7.5 cm, previously 19.2 x 10.4 cm).  3.  Cholelithiasis.    LING RODRIGUEZ MD         SYSTEM ID:  [id number]

## 2024-03-06 LAB
ALBUMIN SERPL BCG-MCNC: 3.6 G/DL (ref 3.5–5.2)
ALP SERPL-CCNC: 74 U/L (ref 40–150)
ALT SERPL W P-5'-P-CCNC: 16 U/L (ref 0–70)
ANION GAP SERPL CALCULATED.3IONS-SCNC: 10 MMOL/L (ref 7–15)
AST SERPL W P-5'-P-CCNC: 12 U/L (ref 0–45)
BACTERIA UR CULT: NO GROWTH
BILIRUB SERPL-MCNC: 1.1 MG/DL
BUN SERPL-MCNC: 14.7 MG/DL (ref 6–20)
CALCIUM SERPL-MCNC: 9.1 MG/DL (ref 8.6–10)
CHLORIDE SERPL-SCNC: 104 MMOL/L (ref 98–107)
CREAT SERPL-MCNC: 0.97 MG/DL (ref 0.67–1.17)
DEPRECATED HCO3 PLAS-SCNC: 24 MMOL/L (ref 22–29)
EGFRCR SERPLBLD CKD-EPI 2021: >90 ML/MIN/1.73M2
ERYTHROCYTE [DISTWIDTH] IN BLOOD BY AUTOMATED COUNT: 14.6 % (ref 10–15)
GLUCOSE SERPL-MCNC: 91 MG/DL (ref 70–99)
HCT VFR BLD AUTO: 38.9 % (ref 40–53)
HGB BLD-MCNC: 12.4 G/DL (ref 13.3–17.7)
LIPASE SERPL-CCNC: 221 U/L (ref 13–60)
MCH RBC QN AUTO: 28.2 PG (ref 26.5–33)
MCHC RBC AUTO-ENTMCNC: 31.9 G/DL (ref 31.5–36.5)
MCV RBC AUTO: 88 FL (ref 78–100)
PLATELET # BLD AUTO: 357 10E3/UL (ref 150–450)
POTASSIUM SERPL-SCNC: 4.1 MMOL/L (ref 3.4–5.3)
PROT SERPL-MCNC: 6.7 G/DL (ref 6.4–8.3)
RBC # BLD AUTO: 4.4 10E6/UL (ref 4.4–5.9)
SODIUM SERPL-SCNC: 138 MMOL/L (ref 135–145)
WBC # BLD AUTO: 8 10E3/UL (ref 4–11)

## 2024-03-06 PROCEDURE — 250N000011 HC RX IP 250 OP 636: Performed by: PHYSICIAN ASSISTANT

## 2024-03-06 PROCEDURE — 250N000013 HC RX MED GY IP 250 OP 250 PS 637: Performed by: PHYSICIAN ASSISTANT

## 2024-03-06 PROCEDURE — 83690 ASSAY OF LIPASE: CPT | Performed by: PHYSICIAN ASSISTANT

## 2024-03-06 PROCEDURE — 99232 SBSQ HOSP IP/OBS MODERATE 35: CPT | Performed by: STUDENT IN AN ORGANIZED HEALTH CARE EDUCATION/TRAINING PROGRAM

## 2024-03-06 PROCEDURE — C9113 INJ PANTOPRAZOLE SODIUM, VIA: HCPCS | Performed by: PHYSICIAN ASSISTANT

## 2024-03-06 PROCEDURE — 36415 COLL VENOUS BLD VENIPUNCTURE: CPT | Performed by: PHYSICIAN ASSISTANT

## 2024-03-06 PROCEDURE — 258N000003 HC RX IP 258 OP 636: Performed by: PHYSICIAN ASSISTANT

## 2024-03-06 PROCEDURE — 80053 COMPREHEN METABOLIC PANEL: CPT | Performed by: PHYSICIAN ASSISTANT

## 2024-03-06 PROCEDURE — 85027 COMPLETE CBC AUTOMATED: CPT | Performed by: PHYSICIAN ASSISTANT

## 2024-03-06 PROCEDURE — 120N000001 HC R&B MED SURG/OB

## 2024-03-06 RX ADMIN — PIPERACILLIN AND TAZOBACTAM 3.38 G: 3; .375 INJECTION, POWDER, FOR SOLUTION INTRAVENOUS at 16:35

## 2024-03-06 RX ADMIN — SODIUM CHLORIDE, POTASSIUM CHLORIDE, SODIUM LACTATE AND CALCIUM CHLORIDE: 600; 310; 30; 20 INJECTION, SOLUTION INTRAVENOUS at 10:38

## 2024-03-06 RX ADMIN — ACETAMINOPHEN 650 MG: 325 TABLET, FILM COATED ORAL at 16:33

## 2024-03-06 RX ADMIN — PANTOPRAZOLE SODIUM 40 MG: 40 INJECTION, POWDER, FOR SOLUTION INTRAVENOUS at 09:40

## 2024-03-06 RX ADMIN — METOPROLOL TARTRATE 25 MG: 25 TABLET, FILM COATED ORAL at 22:08

## 2024-03-06 RX ADMIN — PIPERACILLIN AND TAZOBACTAM 3.38 G: 3; .375 INJECTION, POWDER, FOR SOLUTION INTRAVENOUS at 10:49

## 2024-03-06 RX ADMIN — SODIUM CHLORIDE, POTASSIUM CHLORIDE, SODIUM LACTATE AND CALCIUM CHLORIDE: 600; 310; 30; 20 INJECTION, SOLUTION INTRAVENOUS at 02:49

## 2024-03-06 RX ADMIN — HYDROMORPHONE HYDROCHLORIDE 0.5 MG: 1 INJECTION, SOLUTION INTRAMUSCULAR; INTRAVENOUS; SUBCUTANEOUS at 02:49

## 2024-03-06 RX ADMIN — PIPERACILLIN AND TAZOBACTAM 3.38 G: 3; .375 INJECTION, POWDER, FOR SOLUTION INTRAVENOUS at 23:00

## 2024-03-06 RX ADMIN — PIPERACILLIN AND TAZOBACTAM 3.38 G: 3; .375 INJECTION, POWDER, FOR SOLUTION INTRAVENOUS at 06:38

## 2024-03-06 ASSESSMENT — ACTIVITIES OF DAILY LIVING (ADL)
ADLS_ACUITY_SCORE: 22

## 2024-03-06 NOTE — PLAN OF CARE
Summary:  patient presented with severe abdominal pain found to have acute on chronic pancreatitis.  DATE & TIME: 3/5-03/06/24 Night shift   Cognitive Concerns/ Orientation : A&O x 4   BEHAVIOR & AGGRESSION TOOL COLOR: Green  CIWA SCORE: NA   ABNL VS/O2: VSS, on RA.  MOBILITY: Independent  PAIN MANAGMENT: Gave Dilaudid IV x1 with relief for c/o upper abdominal pain  DIET: NPO   BOWEL/BLADDER: Continent  ABNL LAB/BG: Lipase 265, WBC 15.4, Hgb 12.3  DRAIN/DEVICES: R PIV infusing LR @ 125ml/hr  TELEMETRY RHYTHM: NSR  SKIN: intact except pale and BLE oscar  TESTS/PROCEDURES: Blood cultures and UC results pending.  D/C DAY/GOALS/PLACE: pending improvement  OTHER IMPORTANT INFO: On IV Zosyn. GI following and to see today.    Goal Outcome Evaluation:

## 2024-03-06 NOTE — PROGRESS NOTES
[facility]    Medicine Progress Note - Hospitalist Service    Date of Admission:  3/5/2024  Date of Service: 03/06/2024    Assessment & Plan      Talha Alexander is a 48 year old male with a past medical history significant for HTN and recent admission for acute hemorrhagic biliary pancreatitis complicated by ATN and ileus admitted on 3/5/2024 after presenting with severe abdominal pain found to have acute on chronic pancreatitis.     Abdominal pain, possible acute on chronic pancreatitis   Pancreatic necrosis   Cholelithiasis  Erosive gastropathy and duodenopathy  Patient was recently admitted at [facility] from 1/13/2024 through 1/26/2024 for acute hemorrhagic biliary pancreatitis and choledocholithiasis.  He underwent ERCP with stent placement at that time.  Hospital course was also complicated by BRENDA, acute anemia, and ileus.  Since discharge she has been tolerating a regular diet without significant abdominal pain, vomiting, any fevers or chills. Reports eating large fajita meal evening prior to admission, no ETOH.  *has been following with Gen Surg outpatient for timing of jim  *Presents with abrupt onset severe epigastric abdominal pain  *LFTs are within normal limits, lipase 265 (776 last admission)  *WBC 15.4 (12.9)  *CT on admission shows increased upper abdominal ill defined ascites suggesting acute on chronic pancreatitis. Evolving necrotizing pancreatitis with interval decreased size in surrounding walled-off necrosis  VSS in the ED, afebrile  Plan:  --[facility] consulted -> Discussed with on call biliary physician, no endoscopic cystgastrostomy planned at this time given improvement of size of fluid collection on CT, but will continue with plan for outpatient biliary clinic follow up.   --NPO except ice chips -> start liquids today  --trend CBC, LFT, lipase in am   --LR @125/hr overnight   --Continue empiric zosyn for now   --resume daily PPI  "    Anemia  Improving. Hgb at time of discharge 8> 12.3 on admission.   --monitor     Tobacco use  Declines nicotine replacement    HTN  Continue PTA metoprolol with hold parameters           Diet: Full Liquid Diet    DVT Prophylaxis: Pneumatic Compression Devices  Cornejo Catheter: Not present  Lines: None     Cardiac Monitoring: ACTIVE order. Indication: pancreatitis  Code Status: Full Code      Clinically Significant Risk Factors                         # Overweight: Estimated body mass index is 27.48 kg/m  as calculated from the following:    Height as of this encounter: 1.803 m (5' 11\").    Weight as of this encounter: 89.4 kg (197 lb)., PRESENT ON ADMISSION            Disposition Plan      Expected Discharge Date: 03/07/2024                    Rui Valdez MD  Hospitalist Service  [facility]  Securely message with [employer] (more info)  Text page via [employer] Paging/Directory   ______________________________________________________________________    Interval History     No acute events overnight  Pain improved  No CP/SOB  No fevers  No nausea / vomiting   Feeling hungry  No new complaints    Physical Exam   Vital Signs: Temp: 98  F (36.7  C) Temp src: Oral BP: 106/68 Pulse: 93   Resp: 16 SpO2: 96 % O2 Device: None (Room air)    Weight: 197 lbs 0 oz    Constitutional: Alert and oriented, sitting up in bed.  Non toxic appearing. Appropriately conversant. Appears mildly uncomfortable due to pain   ENT:  moist mucous membranes  Respiratory: Lungs with occasional mild expiratory wheeze, no increased work of breathing   Cardiovascular: Regular rate and rhythm, no LE edema   GI: positive bowel sounds. Abdomen is moderately distended. Tender to palpation across upper abdomen with guarding. No rigidity. Umbilical hernia  Skin/Integumen: warm, dry  MSK:  moves all four extremities. Normal tone   Neuro:  speech is clear. Face symmetric. Follows commands "     ----------------------------------------------------------------------------------------    Medical Decision Making       ------------------ MEDICAL DECISION MAKING ------------------------------------------------------------------------------------------------------  Moderate Complexity Decision Making       Data   ------------------------- PAST 24 HR DATA REVIEWED -----------------------------------------------    I have personally reviewed the following data over the past 24 hrs:    8.0  \   12.4 (L)   / 357     138 104 14.7 /  91   4.1 24 0.97 \     ALT: 16 AST: 12 AP: 74 TBILI: 1.1   ALB: 3.6 TOT PROTEIN: 6.7 LIPASE: 221 (H)       Imaging results reviewed over the past 24 hrs:   No results found for this or any previous visit (from the past 24 hour(s)).  ------------------------- ENCOUNTER LABS ----------------------------------------------------------------  Recent Labs   Lab 03/06/24  0918 03/05/24  1147 02/29/24  0738   WBC 8.0 15.4*  --    HGB 12.4* 12.3*  --    MCV 88 88  --     352  --     139  --    POTASSIUM 4.1 4.3  --    CHLORIDE 104 104  --    CO2 24 25  --    BUN 14.7 16.0  --    CR 0.97 0.94 0.9   ANIONGAP 10 10  --    LUBNA 9.1 9.5  --    GLC 91 97  --    ALBUMIN 3.6 3.9  --    PROTTOTAL 6.7 6.8  --    BILITOTAL 1.1 0.5  --    ALKPHOS 74 77  --    ALT 16 23  --    AST 12 23  --    LIPASE 221* 265*  --        Most Recent 3 CBC's:  Recent Labs   Lab Test 03/06/24  0918 03/05/24  1147 01/26/24  0623   WBC 8.0 15.4* 12.9*   HGB 12.4* 12.3* 8.0*   MCV 88 88 89    352 722*     Most Recent 3 BMP's:  Recent Labs   Lab Test 03/06/24  0918 03/05/24  1147 02/29/24  0738 02/27/24  1123    139  --  141   POTASSIUM 4.1 4.3  --  4.0   CHLORIDE 104 104  --  103   CO2 24 25  --  28   BUN 14.7 16.0  --  8.6   CR 0.97 0.94 0.9 0.91   ANIONGAP 10 10  --  10   LUBNA 9.1 9.5  --  9.7   GLC 91 97  --  92     Most Recent 2 LFT's:  Recent Labs   Lab Test 03/06/24  0918 03/05/24  1147   AST 12 23    ALT 16 23   ALKPHOS 74 77   BILITOTAL 1.1 0.5     Most Recent 3 INR's:No lab results found.  Most Recent 3 Troponin's:No lab results found.  Most Recent 3 BNP's:No lab results found.  Most Recent D-dimer:No lab results found.  Most Recent Cholesterol Panel:  Recent Labs   Lab Test 01/13/24  1333   TRIG 185*     Most Recent 6 Bacteria Isolates From Any Culture (See EPIC Reports for Culture Details):No lab results found.  Most Recent Urinalysis:  Recent Labs   Lab Test 03/05/24  1232   COLOR Yellow   APPEARANCE Clear   URINEGLC Negative   URINEBILI Negative   URINEKETONE Negative   SG 1.023   UBLD Negative   URINEPH 6.0   PROTEIN 30*   NITRITE Negative   LEUKEST Negative   RBCU 1   WBCU 12*     Most Recent ESR & CRP:No lab results found.

## 2024-03-06 NOTE — PROGRESS NOTES
"[facility] GASTROENTEROLOGY PROGRESS NOTE    SUBJECTIVE:  Patient feeling better today. Abdomen feels \"tight\" but pain has resolved.    OBJECTIVE:    /74 (BP Location: Left arm)   Pulse 95   Temp 98.2  F (36.8  C) (Oral)   Resp 16   Ht 1.803 m (5' 11\")   Wt 89.4 kg (197 lb)   SpO2 95%   BMI 27.48 kg/m    Temp (24hrs), Av  F (36.7  C), Min:97.6  F (36.4  C), Max:98.2  F (36.8  C)    Patient Vitals for the past 72 hrs:   Weight   24 1056 89.4 kg (197 lb)     No intake or output data in the 24 hours ending 24 1015      PHYSICAL EXAM    Constitutional: NAD, comfortable  Neuro: normal  Abdomen: minimally distended        Additional Comments:  ROS, FH, SH: See initial GI consult for details.    I have reviewed the patient's new clinical lab results:    Recent Labs   Lab Test 24  0918 24  11424  0623   WBC 8.0 15.4* 12.9*   HGB 12.4* 12.3* 8.0*   MCV 88 88 89    352 722*     Recent Labs   Lab Test 24  1147 24  0738 24  1123 24  0623     --  141 134*   POTASSIUM 4.3  --  4.0 3.3*   CHLORIDE 104  --  103 99   CO2 25  --  28 26   BUN 16.0  --  8.6 28.0*   CR 0.94 0.9 0.91 2.60*   ANIONGAP 10  --  10 9   LUBNA 9.5  --  9.7 7.9*     Recent Labs   Lab Test 24  1232 24  1147 24  1123 24  0623 24  0645 24  1402 24  0546 24  1333   ALBUMIN  --  3.9 4.1 2.7*   < >  --    < > 3.4*   BILITOTAL  --  0.5 0.7 0.6   < >  --    < > 6.1*   ALT  --  23 16 34   < >  --    < > 112*   AST  --  23 23 47*   < >  --    < > 133*   ALKPHOS  --  77 84 76   < >  --    < > 106   PROTEIN 30*  --   --   --   --  30*  --   --    LIPASE  --  265*  --   --   --   --   --  776*    < > = values in this interval not displayed.       3/5/24 CT:  1.  Increased upper abdominal ill-defined ascites suggesting acute on  chronic pancreatitis.  2.  Evolving necrotizing pancreatitis with similar nonenhancing  pancreatic body and interval " decreased in size surrounding walled-off  necrosis (17.8 x 7.5 cm, previously 19.2 x 10.4 cm).  3.  Cholelithiasis.     CONSULTATION ASSESSMENT AND PLAN:    Principal Problem:    Abdominal pain    Pancreatic pseudocyst    Assessment: Admitted 1/13/24-1/26/24 with severe pancreatitis, s/p ERCP with CBD stone removal and sphincterotomy and stent placement 1/14/24.  ERCP 2/27/24 with removal of stent and balloon sphincteroplasty to remove debris and dilate the biliary pancreatic junction due to mild stenosis of the biliary orifice. He had appointment next week for consideration of endoscopic cystgastrostomy.   Was feeling well and tolerating low fat diet until 3/5/24 when he developed acute abdominal pain. WBC slightly up, labs otherwise stable. CT with slight improvement of size of walled off area of necrosis.     Pain is improved today.  Unclear what cause was but will treat as episode of pancreatitis. Patient hungry. Labs pending.  Discussed with on call biliary physician, no endoscopic cystgastrostomy planned at this time given improvement of size of fluid collection on CT, but will continue with plan for outpatient biliary clinic follow up.       Plan:  - start liquids  - follow LFTS and CBC, today's labs pending  - PPI      Glory Henao  [facility]  Office:  [phone]    Approximately 25 minutes of total time was spent providing patient care, including patient evaluation, reviewing documentation/test results, and .

## 2024-03-06 NOTE — PLAN OF CARE
Goal Outcome Evaluation:                      Summary:  patient presented with severe abdominal pain found to have acute on chronic pancreatitis.  DATE & TIME: 3/5/24, pm shift    Cognitive Concerns/ Orientation : A&O x 4   BEHAVIOR & AGGRESSION TOOL COLOR: Green  CIWA SCORE: NA   ABNL VS/O2: VSS, on RA.  MOBILITY: Independent  PAIN MANAGMENT: PRN iv Dilaudid for c/o upper abdominal pain. Also using heat packs.  DIET: NPO except ice chips  BOWEL/BLADDER: Continent  ABNL LAB/BG: Lipase 265, WBC 15.4, Hgb 12.3  DRAIN/DEVICES: R PIV infusing LR @ 125ml/hr  TELEMETRY RHYTHM: NSR  SKIN: intact except pale and  BLE oscar  TESTS/PROCEDURES: Blood cultures and UC result pending.  D/C DAY/GOALS/PLACE: pending improvement  OTHER IMPORTANT INFO: On iv Zosyn. GI following.

## 2024-03-06 NOTE — PLAN OF CARE
Goal Outcome Evaluation:                    Summary:  patient presented with severe abdominal pain found to have acute on chronic pancreatitis.  DATE & TIME: 3/6/24 07-15:30         Cognitive Concerns/ Orientation : A&O x 4   BEHAVIOR & AGGRESSION TOOL COLOR: Green  CIWA SCORE: NA        ABNL VS/O2: VSS, on RA.  MOBILITY: Independent  PAIN MANAGMENT: denies pain  DIET: full liq  BOWEL/BLADDER: Continent  ABNL LAB/BG: Lipase 221 WBC 8 Hgb 12.4  DRAIN/DEVICES: R PIV infusing LR @ 125ml/hr  TELEMETRY RHYTHM: NSR  SKIN: intact except pale and BLE oscar  TESTS/PROCEDURES: Blood cultures and UC results pending.  D/C DAY/GOALS/PLACE: pending improvement  OTHER IMPORTANT INFO: On IV Zosyn.if diet goes well discontinue in am     Goal Outcome Evaluation:

## 2024-03-07 VITALS
RESPIRATION RATE: 16 BRPM | HEIGHT: 71 IN | WEIGHT: 197 LBS | HEART RATE: 87 BPM | TEMPERATURE: 98 F | OXYGEN SATURATION: 97 % | BODY MASS INDEX: 27.58 KG/M2 | DIASTOLIC BLOOD PRESSURE: 81 MMHG | SYSTOLIC BLOOD PRESSURE: 124 MMHG

## 2024-03-07 LAB
ALBUMIN SERPL BCG-MCNC: 3.3 G/DL (ref 3.5–5.2)
ALP SERPL-CCNC: 66 U/L (ref 40–150)
ALT SERPL W P-5'-P-CCNC: 13 U/L (ref 0–70)
ANION GAP SERPL CALCULATED.3IONS-SCNC: 11 MMOL/L (ref 7–15)
AST SERPL W P-5'-P-CCNC: 12 U/L (ref 0–45)
BILIRUB SERPL-MCNC: 1 MG/DL
BUN SERPL-MCNC: 9.2 MG/DL (ref 6–20)
CALCIUM SERPL-MCNC: 8.9 MG/DL (ref 8.6–10)
CHLORIDE SERPL-SCNC: 104 MMOL/L (ref 98–107)
CREAT SERPL-MCNC: 0.88 MG/DL (ref 0.67–1.17)
DEPRECATED HCO3 PLAS-SCNC: 25 MMOL/L (ref 22–29)
EGFRCR SERPLBLD CKD-EPI 2021: >90 ML/MIN/1.73M2
ERYTHROCYTE [DISTWIDTH] IN BLOOD BY AUTOMATED COUNT: 14.1 % (ref 10–15)
GLUCOSE SERPL-MCNC: 93 MG/DL (ref 70–99)
HCT VFR BLD AUTO: 32.1 % (ref 40–53)
HGB BLD-MCNC: 10.5 G/DL (ref 13.3–17.7)
MCH RBC QN AUTO: 28.7 PG (ref 26.5–33)
MCHC RBC AUTO-ENTMCNC: 32.7 G/DL (ref 31.5–36.5)
MCV RBC AUTO: 88 FL (ref 78–100)
PLATELET # BLD AUTO: 262 10E3/UL (ref 150–450)
POTASSIUM SERPL-SCNC: 4 MMOL/L (ref 3.4–5.3)
PROT SERPL-MCNC: 5.9 G/DL (ref 6.4–8.3)
RBC # BLD AUTO: 3.66 10E6/UL (ref 4.4–5.9)
SODIUM SERPL-SCNC: 140 MMOL/L (ref 135–145)
WBC # BLD AUTO: 7.1 10E3/UL (ref 4–11)

## 2024-03-07 PROCEDURE — 85018 HEMOGLOBIN: CPT | Performed by: STUDENT IN AN ORGANIZED HEALTH CARE EDUCATION/TRAINING PROGRAM

## 2024-03-07 PROCEDURE — 250N000011 HC RX IP 250 OP 636: Performed by: PHYSICIAN ASSISTANT

## 2024-03-07 PROCEDURE — 250N000013 HC RX MED GY IP 250 OP 250 PS 637: Performed by: PHYSICIAN ASSISTANT

## 2024-03-07 PROCEDURE — 36415 COLL VENOUS BLD VENIPUNCTURE: CPT | Performed by: STUDENT IN AN ORGANIZED HEALTH CARE EDUCATION/TRAINING PROGRAM

## 2024-03-07 PROCEDURE — 99239 HOSP IP/OBS DSCHRG MGMT >30: CPT | Performed by: STUDENT IN AN ORGANIZED HEALTH CARE EDUCATION/TRAINING PROGRAM

## 2024-03-07 PROCEDURE — C9113 INJ PANTOPRAZOLE SODIUM, VIA: HCPCS | Performed by: PHYSICIAN ASSISTANT

## 2024-03-07 PROCEDURE — 80053 COMPREHEN METABOLIC PANEL: CPT | Performed by: STUDENT IN AN ORGANIZED HEALTH CARE EDUCATION/TRAINING PROGRAM

## 2024-03-07 PROCEDURE — 258N000003 HC RX IP 258 OP 636: Performed by: PHYSICIAN ASSISTANT

## 2024-03-07 RX ORDER — OXYCODONE HYDROCHLORIDE 5 MG/1
5 TABLET ORAL EVERY 6 HOURS PRN
Qty: 15 TABLET | Refills: 0 | Status: SHIPPED | OUTPATIENT
Start: 2024-03-07 | End: 2024-03-07

## 2024-03-07 RX ORDER — PANTOPRAZOLE SODIUM 40 MG/1
40 TABLET, DELAYED RELEASE ORAL DAILY
Qty: 30 TABLET | Refills: 0 | Status: SHIPPED | OUTPATIENT
Start: 2024-03-07 | End: 2024-04-06

## 2024-03-07 RX ADMIN — PIPERACILLIN AND TAZOBACTAM 3.38 G: 3; .375 INJECTION, POWDER, FOR SOLUTION INTRAVENOUS at 04:40

## 2024-03-07 RX ADMIN — METOPROLOL TARTRATE 25 MG: 25 TABLET, FILM COATED ORAL at 08:38

## 2024-03-07 RX ADMIN — PANTOPRAZOLE SODIUM 40 MG: 40 INJECTION, POWDER, FOR SOLUTION INTRAVENOUS at 08:39

## 2024-03-07 RX ADMIN — PIPERACILLIN AND TAZOBACTAM 3.38 G: 3; .375 INJECTION, POWDER, FOR SOLUTION INTRAVENOUS at 11:28

## 2024-03-07 RX ADMIN — SODIUM CHLORIDE, POTASSIUM CHLORIDE, SODIUM LACTATE AND CALCIUM CHLORIDE: 600; 310; 30; 20 INJECTION, SOLUTION INTRAVENOUS at 08:42

## 2024-03-07 ASSESSMENT — ACTIVITIES OF DAILY LIVING (ADL)
ADLS_ACUITY_SCORE: 22

## 2024-03-07 NOTE — PROGRESS NOTES
.Discharge    Patient discharged to home via walking by foot. Lives only a few blocks away with self,  Care plan note completed     Listed belongings gathered and given to patient (including from security/pharmacy). Yes  Care Plan and Patient education resolved: Yes  Prescriptions if needed, hard copies sent with patient  NA  Medication Bin checked and emptied on discharge Yes  SW/care coordinator/charge RN aware of discharge: Yes    AVS reviewed.   Pt will request Protonixs from PCP. Miscommunication with prev shift and it was not filled.   Antibiotics filled and received.   All possession with pt on discharge.   Has appointment on Monday with PCP.

## 2024-03-07 NOTE — PLAN OF CARE
Goal Outcome Evaluation:       Summary:  patient presented with severe abdominal pain found to have acute on chronic pancreatitis.  DATE & TIME: 3/06/24 [id number]   Cognitive Concerns/ Orientation : A&O x 4   BEHAVIOR & AGGRESSION TOOL COLOR: Green   ABNL VS/O2: VSS, on RA.  MOBILITY: Independent  PAIN MANAGMENT: Denies  DIET: Full liquid   BOWEL/BLADDER: Continent  ABNL LAB/BG: Lipase 221 (trending down slowly),  Hgb 12.4  DRAIN/DEVICES: R PIV infusing LR @ 125ml/hr intermittent zosyn  TELEMETRY RHYTHM: NSR  SKIN: intact except pale and BLE oscar  TESTS/PROCEDURES: Blood cultures and UC results pending.  D/C DAY/GOALS/PLACE: pending improvement  OTHER IMPORTANT INFO: On IV Zosyn. GI following and to see today.

## 2024-03-07 NOTE — PLAN OF CARE
Goal Outcome Evaluation:                    Goal Outcome Evaluation:        Summary:  patient presented with severe abdominal pain found to have acute on chronic pancreatitis.  DATE & TIME: 3/07/24 days                                                                                           Cognitive Concerns/ Orientation : A&O x 4   BEHAVIOR & AGGRESSION TOOL COLOR: Green                                                              ABNL VS/O2: VSS, on RA.  MOBILITY: Independent  PAIN MANAGMENT: Denies  DIET: low fat diet  BOWEL/BLADDER: Continent  ABNL LAB/BG: Lipase 221 (trending down slowly),  Hgb 10.9  DRAIN/DEVICES: R PIV infusing LR @ 125ml/hr intermittent zosyn  TELEMETRY RHYTHM: NSR  SKIN: intact except pale and BLE oscar  TESTS/PROCEDURES:   D/C DAY/GOALS/PLACE: today  OTHER IMPORTANT INFO: On IV Zosyn.to have po at home.

## 2024-03-07 NOTE — PROGRESS NOTES
Summary:  patient presented with severe abdominal pain found to have acute on chronic pancreatitis.  DATE & TIME: 3/06/24 Evening   Cognitive Concerns/ Orientation : A&O x 4   BEHAVIOR & AGGRESSION TOOL COLOR: Green   ABNL VS/O2: VSS, on RA.  MOBILITY: Independent  PAIN MANAGMENT: Gave Tylenol for Back pain - not complaining of Abdominal pain some discomfort.  DIET: Full liquid   BOWEL/BLADDER: Continent  ABNL LAB/BG: Lipase 221 (trending down slowly),  Hgb 12.4  DRAIN/DEVICES: R PIV infusing LR @ 125ml/hr intermittent zosyn  TELEMETRY RHYTHM: NSR  SKIN: intact except pale and BLE oscar  TESTS/PROCEDURES: Blood cultures and UC resulted  D/C DAY/GOALS/PLACE: pending improvement  OTHER IMPORTANT INFO: On IV Zosyn. GI following and to see today.

## 2024-03-07 NOTE — DISCHARGE SUMMARY
"[facility]  Hospitalist Discharge Summary      Date of Admission:  3/5/2024  Date of Discharge:  3/7/2024  Discharging Provider: Rui Valdez MD  Discharge Service: Hospitalist Service    Discharge Diagnoses     Abdominal pain, possible acute on chronic pancreatitis   Pancreatic necrosis   Cholelithiasis  Erosive gastropathy and duodenopathy    Clinically Significant Risk Factors     # Overweight: Estimated body mass index is 27.48 kg/m  as calculated from the following:    Height as of this encounter: 1.803 m (5' 11\").    Weight as of this encounter: 89.4 kg (197 lb).       Follow-ups Needed After Discharge   Follow-up Appointments     Follow-up and recommended labs and tests       Follow up with primary care provider, Jemma Burch, within 7 days   for hospital follow- up.  No follow up labs or test are needed.            Unresulted Labs Ordered in the Past 30 Days of this Admission       Date and Time Order Name Status Description    3/5/2024  3:53 PM Blood Culture Arm, Left Preliminary     3/5/2024  3:53 PM Blood Culture Arm, Right Preliminary           Discharge Disposition   Discharged to home  Condition at discharge: Stable    Hospital Course   Talha Alexander is a 48 year old male with a past medical history significant for HTN and recent admission for acute hemorrhagic biliary pancreatitis complicated by ATN and ileus admitted on 3/5/2024 after presenting with severe abdominal pain found to have acute on chronic pancreatitis.     Abdominal pain, possible acute on chronic pancreatitis   Pancreatic necrosis   Cholelithiasis  Erosive gastropathy and duodenopathy  Patient was recently admitted at [facility] from 1/13/2024 through 1/26/2024 for acute hemorrhagic biliary pancreatitis and choledocholithiasis.  He underwent ERCP with stent placement at that time.  Hospital course was also complicated by BRENDA, acute anemia, and ileus.  Since discharge she has been " tolerating a regular diet without significant abdominal pain, vomiting, any fevers or chills. Reports eating large fajita meal evening prior to admission, no ETOH.  *has been following with Gen Surg outpatient for timing of jim  *Presents with abrupt onset severe epigastric abdominal pain  *LFTs are within normal limits, lipase 265 (776 last admission)  *WBC 15.4 (12.9)  *CT on admission shows increased upper abdominal ill defined ascites suggesting acute on chronic pancreatitis. Evolving necrotizing pancreatitis with interval decreased size in surrounding walled-off necrosis  VSS in the ED, afebrile  Plan:  --[facility] consulted -> Discussed with on call biliary physician, no endoscopic cystgastrostomy planned at this time given improvement of size of fluid collection on CT, but will continue with plan for outpatient biliary clinic follow up.   -- Low fat Diet  -- Augmentin for 7 days  -- GI clinic follow up as scheduled next week     Anemia  Improving. Hgb at time of discharge 8> 12.3 on admission.   --monitor     Tobacco use  Declines nicotine replacement    HTN  Continue PTA metoprolol with hold parameters     Consultations This Hospital Stay   GASTROENTEROLOGY IP CONSULT    Code Status   Full Code    Time Spent on this Encounter   I, Rui Valdez MD, personally saw the patient today and spent greater than 30 minutes discharging this patient.       Rui Valdez MD  [place] [address] [facility]  [address] [place] [address]  Phone: [phone]  ______________________________________________________________________    Physical Exam   Vital Signs: Temp: (P) 97.2  F (36.2  C) Temp src: (P) Oral BP: (!) (P) 133/91 Pulse: (P) 87   Resp: (P) 16 SpO2: (P) 97 % O2 Device: (P) None (Room air)    Weight: 197 lbs 0 oz  ----------------------------------------------------------------------------------------       Primary Care Physician   Jemma Burch    Discharge Orders      Reason for  your hospital stay    You had abdominal pain from pancreatitis     Follow-up and recommended labs and tests     Follow up with primary care provider, Jemma Burch, within 7 days for hospital follow- up.  No follow up labs or test are needed.     Activity    Your activity upon discharge: activity as tolerated     Diet    Follow this diet upon discharge: Orders Placed This Encounter      Low Fat Diet (up to 50g)       Significant Results and Procedures   Most Recent 3 CBC's:  Recent Labs   Lab Test 03/07/24  0824 03/06/24  0918 03/05/24  1147   WBC 7.1 8.0 15.4*   HGB 10.5* 12.4* 12.3*   MCV 88 88 88    357 352     Most Recent 3 BMP's:  Recent Labs   Lab Test 03/07/24  0824 03/06/24  0918 03/05/24  1147    138 139   POTASSIUM 4.0 4.1 4.3   CHLORIDE 104 104 104   CO2 25 24 25   BUN 9.2 14.7 16.0   CR 0.88 0.97 0.94   ANIONGAP 11 10 10   LUBNA 8.9 9.1 9.5   GLC 93 91 97     Most Recent 2 LFT's:  Recent Labs   Lab Test 03/07/24  0824 03/06/24  0918   AST 12 12   ALT 13 16   ALKPHOS 66 74   BILITOTAL 1.0 1.1     Most Recent 3 INR's:No lab results found.  Most Recent 3 Troponin's:No lab results found.  Most Recent 3 BNP's:No lab results found.  Most Recent D-dimer:No lab results found.  Most Recent Cholesterol Panel:  Recent Labs   Lab Test 01/13/24  1333   TRIG 185*     7-Day Micro Results       Collected Updated Procedure Result Status      03/05/2024 1819 03/06/2024 2216 Blood Culture Arm, Left [[id number]]   Blood from Arm, Left    Preliminary result Component Value   Culture No growth after 1 day  [P]                03/05/2024 1813 03/06/2024 2216 Blood Culture Arm, Right [[id number]]   Blood from Arm, Right    Preliminary result Component Value   Culture No growth after 1 day  [P]                03/05/2024 1232 03/06/2024 1040 Urine Culture [[id number]]   Urine, NOS    Final result Component Value   Culture No Growth                     Most Recent TSH and T4:No lab results found.  Most  Recent Urinalysis:  Recent Labs   Lab Test 03/05/24  1232   COLOR Yellow   APPEARANCE Clear   URINEGLC Negative   URINEBILI Negative   URINEKETONE Negative   SG 1.023   UBLD Negative   URINEPH 6.0   PROTEIN 30*   NITRITE Negative   LEUKEST Negative   RBCU 1   WBCU 12*     Most Recent ESR & CRP:No lab results found.,   Results for orders placed or performed during the hospital encounter of 03/05/24   CT Abdomen Pelvis w Contrast    Narrative    CT ABDOMEN AND PELVIS WITH CONTRAST 3/5/2024 1:11 PM    CLINICAL HISTORY: Recurrent upper abdominal pain, known pancreatic  pseudocyst.    TECHNIQUE: CT scan of the abdomen and pelvis was performed following  injection of IV contrast. Multiplanar reformats were obtained. Dose  reduction techniques were used.  CONTRAST: 99 mL Isovue-370    COMPARISON: CT chest, abdomen and pelvis 2/29/2024.    FINDINGS:   LOWER CHEST: No infiltrates or effusions.    HEPATOBILIARY: No liver lesion. Cholelithiasis.    PANCREAS: Continued evolving necrotizing pancreatitis with similar  nonenhancing portion of the pancreatic body (3/80). Decreased in size  walled-off necrosis overall measuring 17.8 x 7.5 cm, previously 19.2 x  10.4 cm, which extends into the mesentery.    New/increased ill-defined fluid within the left upper quadrant and  perihepatic spaces. No new organized drainable fluid collection.    SPLEEN: Normal size.    ADRENAL GLANDS: No significant nodules.    KIDNEYS/BLADDER: No significant mass, stones, or hydronephrosis.    BOWEL: No obstruction or inflammatory change.    PELVIC ORGANS: No pelvic masses.    ADDITIONAL FINDINGS: Small volume pelvic ascites. Nonaneurysmal  abdominal aorta. Mild atherosclerosis. Portal veins are patent.  Similar diminutive splenic vein.     MUSCULOSKELETAL: Unremarkable.      Impression    IMPRESSION:   1.  Increased upper abdominal ill-defined ascites suggesting acute on  chronic pancreatitis.  2.  Evolving necrotizing pancreatitis with similar  nonenhancing  pancreatic body and interval decreased in size surrounding walled-off  necrosis (17.8 x 7.5 cm, previously 19.2 x 10.4 cm).  3.  Cholelithiasis.    LING RODRIGUEZ MD         SYSTEM ID:  [id number]       Discharge Medications   Current Discharge Medication List        START taking these medications    Details   amoxicillin-clavulanate (AUGMENTIN) 875-125 MG tablet Take 1 tablet by mouth 2 times daily for 7 days  Qty: 14 tablet, Refills: 0    Associated Diagnoses: Pancreatic pseudocyst; Acute biliary pancreatitis, unspecified complication status      pantoprazole (PROTONIX) 40 MG EC tablet Take 1 tablet (40 mg) by mouth daily for 30 days  Qty: 30 tablet, Refills: 0    Associated Diagnoses: Pancreatic pseudocyst; Acute biliary pancreatitis, unspecified complication status           CONTINUE these medications which have NOT CHANGED    Details   acetaminophen (TYLENOL) 325 MG tablet Take 1-2 tablets (325-650 mg) by mouth every 6 hours as needed for mild pain    Associated Diagnoses: Acute biliary pancreatitis, unspecified complication status      metoprolol tartrate (LOPRESSOR) 25 MG tablet Take 1 tablet (25 mg) by mouth 2 times daily  Qty: 60 tablet, Refills: 0    Associated Diagnoses: Tachycardia           Allergies   Allergies   Allergen Reactions    Peanut-Containing Drug Products Anaphylaxis    Tree Nuts [Nuts]

## 2024-03-07 NOTE — PROGRESS NOTES
"[facility] GASTROENTEROLOGY PROGRESS NOTE    SUBJECTIVE:  Feeling better today, eager to advance diet. Pain has resolved.    OBJECTIVE:    BP (!) (P) 133/91 (BP Location: Right arm)   Pulse (P) 87   Temp (P) 97.2  F (36.2  C) (Oral)   Resp (P) 16   Ht 1.803 m (5' 11\")   Wt 89.4 kg (197 lb)   SpO2 (P) 97%   BMI 27.48 kg/m    Temp (24hrs), Av  F (36.7  C), Min:97.6  F (36.4  C), Max:98.2  F (36.8  C)    Patient Vitals for the past 72 hrs:   Weight   24 1056 89.4 kg (197 lb)     No intake or output data in the 24 hours ending 24 1015      PHYSICAL EXAM    Constitutional: NAD, comfortable  Neuro: normal  Abdomen: minimally distended        Additional Comments:  ROS, FH, SH: See initial GI consult for details.    I have reviewed the patient's new clinical lab results:    Recent Labs   Lab Test 24  0824  0918 24  1147   WBC 7.1 8.0 15.4*   HGB 10.5* 12.4* 12.3*   MCV 88 88 88    357 352     Recent Labs   Lab Test 24  0824 24  0918 24  1147    138 139   POTASSIUM 4.0 4.1 4.3   CHLORIDE 104 104 104   CO2 25 24 25   BUN 9.2 14.7 16.0   CR 0.88 0.97 0.94   ANIONGAP 11 10 10   LUBNA 8.9 9.1 9.5     Recent Labs   Lab Test 24  0824 24  0918 24  1232 24  1147 24  0645 24  1402 24  0546 24  1333   ALBUMIN 3.3* 3.6  --  3.9   < >  --    < > 3.4*   BILITOTAL 1.0 1.1  --  0.5   < >  --    < > 6.1*   ALT 13 16  --  23   < >  --    < > 112*   AST 12 12  --  23   < >  --    < > 133*   ALKPHOS 66 74  --  77   < >  --    < > 106   PROTEIN  --   --  30*  --   --  30*  --   --    LIPASE  --  221*  --  265*  --   --   --  776*    < > = values in this interval not displayed.       3/5/24 CT:  1.  Increased upper abdominal ill-defined ascites suggesting acute on  chronic pancreatitis.  2.  Evolving necrotizing pancreatitis with similar nonenhancing  pancreatic body and interval decreased in size surrounding walled-off  necrosis " (17.8 x 7.5 cm, previously 19.2 x 10.4 cm).  3.  Cholelithiasis.     CONSULTATION ASSESSMENT AND PLAN:    Principal Problem:    Abdominal pain    Pancreatic pseudocyst    Assessment: Admitted 1/13/24-1/26/24 with severe pancreatitis, s/p ERCP with CBD stone removal and sphincterotomy and stent placement 1/14/24.  ERCP 2/27/24 with removal of stent and balloon sphincteroplasty to remove debris and dilate the biliary pancreatic junction due to mild stenosis of the biliary orifice. Admitted 3/5/24 with acute onset of pain and increase in WBC. CT 3/5/24 with slight improvement of size of walled off area of necrosis.     Pain improved quickly. He was started on antibiotics on admission.       Plan:  - low fat diet  - would continue antibiotics to complete a 7 day course  - GI clinic follow up as scheduled next week    If patient tolerating diet, I am ok with discharge today.      Glory Henao  [facility]  Office:  [phone]    Approximately 20 minutes of total time was spent providing patient care, including patient evaluation, reviewing documentation/test results, and .

## 2024-03-08 ENCOUNTER — PATIENT OUTREACH (OUTPATIENT)
Dept: CARE COORDINATION | Facility: CLINIC | Age: 49
End: 2024-03-08
Payer: COMMERCIAL

## 2024-03-08 NOTE — PROGRESS NOTES
"Clinic Care Coordination Contact  [facility]: Post-Discharge Note  SITUATION                                                      Admission:    Admission Date: 03/05/24   Reason for Admission: Calculus of gallbladder with cholecystitis without biliary obstruction, unspecified cholecystitis acuity  Discharge:   Discharge Date: 03/07/24  Discharge Diagnosis: Calculus of gallbladder with cholecystitis without biliary obstruction, unspecified cholecystitis acuity    BACKGROUND                                                      Per hospital discharge summary and inpatient provider notes:  Talha Alexander is a 48 year old male with a past medical history significant for HTN and recent admission for acute hemorrhagic biliary pancreatitis complicated by ATN and ileus admitted on 3/5/2024 after presenting with severe abdominal pain found to have acute on chronic pancreatitis.     ASSESSMENT           Discharge Assessment  How are you doing now that you are home?: \" I'm good \"  How are your symptoms? (Red Flag symptoms escalate to triage hotline per guidelines): Improved  Do you feel your condition is stable enough to be safe at home until your provider visit?: Yes  Does the patient have their discharge instructions? : Yes  Does the patient have questions regarding their discharge instructions? : No  Were you started on any new medications or were there changes to any of your previous medications? : Yes  Does the patient have all of their medications?: Yes  Do you have questions regarding any of your medications? : No  Do you have all of your needed medical supplies or equipment (DME)?  (i.e. oxygen tank, CPAP, cane, etc.): Yes  Discharge follow-up appointment scheduled within 14 calendar days? : Yes  Discharge Follow Up Appointment Date: 03/11/24  Is patient agreeable to assistance with scheduling? : No    Post-op ([facility] CTA Only)  If the patient had a surgery or procedure, do they have any questions for a nurse?: " No             PLAN                                                      Outpatient Plan:    Follow up with primary care provider, Jemma Burch, within 7 days for hospital follow- up.  No follow up labs or test are needed.          Activity     Your activity upon discharge: activity as tolerated          Diet     Follow this diet upon discharge: Orders Placed This Encounter      Low Fat Diet (up to 50g)       No future appointments.      For any urgent concerns, please contact our 24 hour nurse triage line: [phone] ([phone])         Sadia Rowan MA

## 2024-03-10 ENCOUNTER — HEALTH MAINTENANCE LETTER (OUTPATIENT)
Age: 49
End: 2024-03-10

## 2024-03-10 LAB
BACTERIA BLD CULT: NO GROWTH
BACTERIA BLD CULT: NO GROWTH

## 2024-04-08 ENCOUNTER — HOSPITAL ENCOUNTER (OUTPATIENT)
Dept: CT IMAGING | Facility: CLINIC | Age: 49
Discharge: HOME | End: 2024-04-08
Attending: INTERNAL MEDICINE | Admitting: INTERNAL MEDICINE
Payer: COMMERCIAL

## 2024-04-08 DIAGNOSIS — Z87.19 HISTORY OF PANCREATITIS: ICD-10-CM

## 2024-04-08 PROCEDURE — 74177 CT ABD & PELVIS W/CONTRAST: CPT

## 2024-04-08 PROCEDURE — 250N000009 HC RX 250: Performed by: INTERNAL MEDICINE

## 2024-04-08 PROCEDURE — 250N000011 HC RX IP 250 OP 636: Performed by: INTERNAL MEDICINE

## 2024-04-08 RX ORDER — IOPAMIDOL 755 MG/ML
96 INJECTION, SOLUTION INTRAVASCULAR ONCE
Status: COMPLETED | OUTPATIENT
Start: 2024-04-08 | End: 2024-04-08

## 2024-04-08 RX ADMIN — SODIUM CHLORIDE 67 ML: 9 INJECTION, SOLUTION INTRAVENOUS at 08:17

## 2024-04-08 RX ADMIN — IOPAMIDOL 96 ML: 755 INJECTION, SOLUTION INTRAVENOUS at 08:17

## 2024-05-09 ENCOUNTER — HOSPITAL ENCOUNTER (OUTPATIENT)
Dept: CT IMAGING | Facility: CLINIC | Age: 49
Discharge: HOME | End: 2024-05-09
Attending: INTERNAL MEDICINE | Admitting: INTERNAL MEDICINE
Payer: COMMERCIAL

## 2024-05-09 DIAGNOSIS — Z87.19 HISTORY OF PANCREATITIS: ICD-10-CM

## 2024-05-09 PROCEDURE — 74177 CT ABD & PELVIS W/CONTRAST: CPT

## 2024-05-09 PROCEDURE — 250N000011 HC RX IP 250 OP 636: Performed by: INTERNAL MEDICINE

## 2024-05-09 PROCEDURE — 250N000009 HC RX 250: Performed by: INTERNAL MEDICINE

## 2024-05-09 RX ORDER — IOPAMIDOL 755 MG/ML
96 INJECTION, SOLUTION INTRAVASCULAR ONCE
Status: COMPLETED | OUTPATIENT
Start: 2024-05-09 | End: 2024-05-09

## 2024-05-09 RX ADMIN — IOPAMIDOL 96 ML: 755 INJECTION, SOLUTION INTRAVENOUS at 08:30

## 2024-05-09 RX ADMIN — SODIUM CHLORIDE 67 ML: 9 INJECTION, SOLUTION INTRAVENOUS at 08:30

## 2024-07-05 ENCOUNTER — HOSPITAL ENCOUNTER (OUTPATIENT)
Dept: CT IMAGING | Facility: CLINIC | Age: 49
Discharge: HOME | End: 2024-07-05
Attending: INTERNAL MEDICINE | Admitting: INTERNAL MEDICINE
Payer: COMMERCIAL

## 2024-07-05 DIAGNOSIS — K86.2 PANCREAS CYST: ICD-10-CM

## 2024-07-05 PROCEDURE — 74160 CT ABDOMEN W/CONTRAST: CPT

## 2024-07-05 PROCEDURE — 250N000011 HC RX IP 250 OP 636: Performed by: INTERNAL MEDICINE

## 2024-07-05 RX ORDER — IOPAMIDOL 755 MG/ML
90 INJECTION, SOLUTION INTRAVASCULAR ONCE
Status: COMPLETED | OUTPATIENT
Start: 2024-07-05 | End: 2024-07-05

## 2024-07-05 RX ADMIN — IOPAMIDOL 90 ML: 755 INJECTION, SOLUTION INTRAVENOUS at 11:37

## 2024-07-25 ENCOUNTER — HOSPITAL ENCOUNTER (OUTPATIENT)
Dept: CT IMAGING | Facility: CLINIC | Age: 49
Discharge: HOME | End: 2024-07-25
Attending: INTERNAL MEDICINE | Admitting: INTERNAL MEDICINE
Payer: COMMERCIAL

## 2024-07-25 DIAGNOSIS — K86.2 PANCREAS CYST: ICD-10-CM

## 2024-07-25 PROCEDURE — 250N000011 HC RX IP 250 OP 636: Performed by: INTERNAL MEDICINE

## 2024-07-25 PROCEDURE — 74170 CT ABD WO CNTRST FLWD CNTRST: CPT

## 2024-07-25 RX ORDER — IOPAMIDOL 755 MG/ML
90 INJECTION, SOLUTION INTRAVASCULAR ONCE
Status: COMPLETED | OUTPATIENT
Start: 2024-07-25 | End: 2024-07-25

## 2024-07-25 RX ADMIN — IOPAMIDOL 90 ML: 755 INJECTION, SOLUTION INTRAVENOUS at 08:53

## 2024-08-07 ENCOUNTER — TELEPHONE (OUTPATIENT)
Dept: SURGERY | Facility: CLINIC | Age: 49
End: 2024-08-07

## 2024-08-07 ENCOUNTER — OFFICE VISIT (OUTPATIENT)
Dept: SURGERY | Facility: CLINIC | Age: 49
End: 2024-08-07
Payer: COMMERCIAL

## 2024-08-07 VITALS
HEIGHT: 71 IN | RESPIRATION RATE: 16 BRPM | HEART RATE: 82 BPM | OXYGEN SATURATION: 100 % | DIASTOLIC BLOOD PRESSURE: 72 MMHG | SYSTOLIC BLOOD PRESSURE: 110 MMHG | BODY MASS INDEX: 23.8 KG/M2 | WEIGHT: 170 LBS

## 2024-08-07 DIAGNOSIS — K85.10 GALLSTONE PANCREATITIS: Primary | ICD-10-CM

## 2024-08-07 PROCEDURE — 99214 OFFICE O/P EST MOD 30 MIN: CPT | Performed by: SURGERY

## 2024-08-07 RX ORDER — HEPARIN SODIUM 10000 [USP'U]/ML
5000 INJECTION, SOLUTION INTRAVENOUS; SUBCUTANEOUS
Status: CANCELLED | OUTPATIENT
Start: 2024-08-07

## 2024-08-07 NOTE — LETTER
August 8, 2024          Jemma Burch MD  [address] [facility] [address]  [place] [address]      RE:   Talha Alexander 1975      Dear Colleague,    Thank you for referring your patient, Talha Alexander, to [facility] at [facility]. Please see a copy of my visit note below.    Talha Alexander is a 49 year old male who I met inpatient at [facility].  At the time he was suffering from severe gallstone pancreatitis.  He developed pancreatic necrosis, and developed a large pancreatic pseudocyst.  He has been admitted multiple times both to [facility] and [place].  The pseudocyst is now resolved and he is approaching his usual state of health.  He still has his gallbladder in.     He and I discussed eventually getting his gallbladder out.  This would prevent further episodes of gallstone pancreatitis.  He is discussed that we need to do it at the safe time when in the process that was going on his abdomen had reached a point that it was safe.  We now reached that point.     I reviewed all of his records from the last time that I saw him.  I reviewed the records from [facility] and from [place].  His last CAT scan shows resolution of his pancreatic inflammatory process.     I discussed robotic cholecystectomy with him.  Given that this was gallstone pancreatitis it is probably wise to do a cholangiogram.  I discussed the preoperative, perioperative, and postoperative course of laparoscopic cholecystectomy with cholangiogram.  If everything goes well this can be an outpatient procedure.  He does know that his abdomen is not normal.  He may have increased inflammation and this may mean that further dissection needs to be done.  I have specifically for the robotic platform due to its increased ability for better visualization and better dissection.  Using firefly will also help delineate the anatomy.  I discussed potential risks and complications, which include but are not limited to, bleeding,  infection, injury to other structures, injury to common bile duct or hepatic ducts.  He agrees to proceed.     Will proceed with a robotic cholecystectomy with cholangiogram when it can be scheduled.  He will get a preoperative history and physical.       Again, thank you for allowing me to participate in the care of your patient.      Sincerely,      Leno Fischer MD

## 2024-08-07 NOTE — TELEPHONE ENCOUNTER
Type of surgery: Robotic assisted laparoscopic cholecystectomy with intraoperative cholangiogram  Location of surgery: [facility]  Date and time of surgery: 8/27/24 at 7:30am  Surgeon: Dr. Leno Fischer  Pre-Op Appt Date: patient to schedule  Post-Op Appt Date: patient to schedule   Packet sent out: Yes  Pre-cert/Authorization completed:  Not Applicable  Date: 8/7/24

## 2024-08-08 NOTE — PROGRESS NOTES
Surgery Consultation, [facility], JESSI Fischer MD    Talha Alexander MRN# [id number]   YOB: 1975 Age: 49 year old     Talha Alexander is a 49 year old male who I met inpatient at [facility].  At the time he was suffering from severe gallstone pancreatitis.  He developed pancreatic necrosis, and developed a large pancreatic pseudocyst.  He has been admitted multiple times both to [facility] and [place].  The pseudocyst is now resolved and he is approaching his usual state of health.  He still has his gallbladder in.    He and I discussed eventually getting his gallbladder out.  This would prevent further episodes of gallstone pancreatitis.  He is discussed that we need to do it at the safe time when in the process that was going on his abdomen had reached a point that it was safe.  We now reached that point.    I reviewed all of his records from the last time that I saw him.  I reviewed the records from [facility] and from [place].  His last CAT scan shows resolution of his pancreatic inflammatory process.    I discussed robotic cholecystectomy with him.  Given that this was gallstone pancreatitis it is probably wise to do a cholangiogram.  I discussed the preoperative, perioperative, and postoperative course of laparoscopic cholecystectomy with cholangiogram.  If everything goes well this can be an outpatient procedure.  He does know that his abdomen is not normal.  He may have increased inflammation and this may mean that further dissection needs to be done.  I have specifically for the robotic platform due to its increased ability for better visualization and better dissection.  Using firefly will also help delineate the anatomy.  I discussed potential risks and complications, which include but are not limited to, bleeding, infection, injury to other structures, injury to common bile duct or hepatic ducts.  He agrees to proceed.    Will proceed with a robotic cholecystectomy with  cholangiogram when it can be scheduled.  He will get a preoperative history and physical.    I spent 30 minutes either with the patient, reviewing his available past records, labs, imaging, or discussing with other physicians his care.  Over 50% of the time was spent in direct education of the patient.    Leno Fischer M.D., .A.C.[facility]  [facility]  [place], [place]  [phone]

## 2024-08-26 ENCOUNTER — ANESTHESIA EVENT (OUTPATIENT)
Dept: SURGERY | Facility: CLINIC | Age: 49
End: 2024-08-26
Payer: COMMERCIAL

## 2024-08-27 ENCOUNTER — APPOINTMENT (OUTPATIENT)
Dept: GENERAL RADIOLOGY | Facility: CLINIC | Age: 49
End: 2024-08-27
Attending: SURGERY
Payer: COMMERCIAL

## 2024-08-27 ENCOUNTER — APPOINTMENT (OUTPATIENT)
Dept: SURGERY | Facility: PHYSICIAN GROUP | Age: 49
End: 2024-08-27
Payer: COMMERCIAL

## 2024-08-27 ENCOUNTER — HOSPITAL ENCOUNTER (OUTPATIENT)
Facility: CLINIC | Age: 49
Discharge: HOME | End: 2024-08-27
Attending: SURGERY | Admitting: SURGERY
Payer: COMMERCIAL

## 2024-08-27 ENCOUNTER — ANESTHESIA (OUTPATIENT)
Dept: SURGERY | Facility: CLINIC | Age: 49
End: 2024-08-27
Payer: COMMERCIAL

## 2024-08-27 VITALS
WEIGHT: 178.5 LBS | OXYGEN SATURATION: 98 % | HEIGHT: 71 IN | DIASTOLIC BLOOD PRESSURE: 84 MMHG | BODY MASS INDEX: 24.99 KG/M2 | RESPIRATION RATE: 16 BRPM | TEMPERATURE: 98.3 F | HEART RATE: 73 BPM | SYSTOLIC BLOOD PRESSURE: 123 MMHG

## 2024-08-27 DIAGNOSIS — K85.10 GALLSTONE PANCREATITIS: ICD-10-CM

## 2024-08-27 PROCEDURE — 250N000011 HC RX IP 250 OP 636: Performed by: SURGERY

## 2024-08-27 PROCEDURE — 710N000009 HC RECOVERY PHASE 1, LEVEL 1, PER MIN: Performed by: SURGERY

## 2024-08-27 PROCEDURE — 47563 LAPARO CHOLECYSTECTOMY/GRAPH: CPT | Performed by: ANESTHESIOLOGY

## 2024-08-27 PROCEDURE — 272N000001 HC OR GENERAL SUPPLY STERILE: Performed by: SURGERY

## 2024-08-27 PROCEDURE — 250N000013 HC RX MED GY IP 250 OP 250 PS 637: Performed by: PHYSICIAN ASSISTANT

## 2024-08-27 PROCEDURE — 250N000009 HC RX 250: Performed by: SURGERY

## 2024-08-27 PROCEDURE — 250N000011 HC RX IP 250 OP 636: Performed by: ANESTHESIOLOGY

## 2024-08-27 PROCEDURE — S2900 ROBOTIC SURGICAL SYSTEM: HCPCS | Performed by: SURGERY

## 2024-08-27 PROCEDURE — 250N000025 HC SEVOFLURANE, PER MIN: Performed by: SURGERY

## 2024-08-27 PROCEDURE — 360N000080 HC SURGERY LEVEL 7, PER MIN: Performed by: SURGERY

## 2024-08-27 PROCEDURE — 250N000009 HC RX 250: Performed by: ANESTHESIOLOGY

## 2024-08-27 PROCEDURE — 258N000003 HC RX IP 258 OP 636: Performed by: ANESTHESIOLOGY

## 2024-08-27 PROCEDURE — 258N000001 HC RX 258: Performed by: SURGERY

## 2024-08-27 PROCEDURE — 710N000012 HC RECOVERY PHASE 2, PER MINUTE: Performed by: SURGERY

## 2024-08-27 PROCEDURE — 88304 TISSUE EXAM BY PATHOLOGIST: CPT | Mod: 26 | Performed by: PATHOLOGY

## 2024-08-27 PROCEDURE — 47563 LAPARO CHOLECYSTECTOMY/GRAPH: CPT | Performed by: NURSE ANESTHETIST, CERTIFIED REGISTERED

## 2024-08-27 PROCEDURE — 255N000002 HC RX 255 OP 636: Performed by: SURGERY

## 2024-08-27 PROCEDURE — 999N000141 HC STATISTIC PRE-PROCEDURE NURSING ASSESSMENT: Performed by: SURGERY

## 2024-08-27 PROCEDURE — 47563 LAPARO CHOLECYSTECTOMY/GRAPH: CPT | Mod: AS | Performed by: PHYSICIAN ASSISTANT

## 2024-08-27 PROCEDURE — 88304 TISSUE EXAM BY PATHOLOGIST: CPT | Mod: TC | Performed by: SURGERY

## 2024-08-27 PROCEDURE — 47563 LAPARO CHOLECYSTECTOMY/GRAPH: CPT | Performed by: SURGERY

## 2024-08-27 PROCEDURE — 370N000017 HC ANESTHESIA TECHNICAL FEE, PER MIN: Performed by: SURGERY

## 2024-08-27 PROCEDURE — 999N000179 XR SURGERY CARM FLUORO LESS THAN 5 MIN W STILLS: Mod: TC

## 2024-08-27 RX ORDER — SODIUM CHLORIDE, SODIUM LACTATE, POTASSIUM CHLORIDE, CALCIUM CHLORIDE 600; 310; 30; 20 MG/100ML; MG/100ML; MG/100ML; MG/100ML
INJECTION, SOLUTION INTRAVENOUS CONTINUOUS PRN
Status: DISCONTINUED | OUTPATIENT
Start: 2024-08-27 | End: 2024-08-27

## 2024-08-27 RX ORDER — HYDROMORPHONE HCL IN WATER/PF 6 MG/30 ML
0.2 PATIENT CONTROLLED ANALGESIA SYRINGE INTRAVENOUS EVERY 5 MIN PRN
Status: DISCONTINUED | OUTPATIENT
Start: 2024-08-27 | End: 2024-08-27 | Stop reason: HOSPADM

## 2024-08-27 RX ORDER — DEXMEDETOMIDINE HYDROCHLORIDE 4 UG/ML
INJECTION, SOLUTION INTRAVENOUS PRN
Status: DISCONTINUED | OUTPATIENT
Start: 2024-08-27 | End: 2024-08-27

## 2024-08-27 RX ORDER — HEPARIN SODIUM 5000 [USP'U]/.5ML
5000 INJECTION, SOLUTION INTRAVENOUS; SUBCUTANEOUS
Status: COMPLETED | OUTPATIENT
Start: 2024-08-27 | End: 2024-08-27

## 2024-08-27 RX ORDER — HYDROCODONE BITARTRATE AND ACETAMINOPHEN 5; 325 MG/1; MG/1
1-2 TABLET ORAL
Status: COMPLETED | OUTPATIENT
Start: 2024-08-27 | End: 2024-08-27

## 2024-08-27 RX ORDER — DEXAMETHASONE SODIUM PHOSPHATE 4 MG/ML
INJECTION, SOLUTION INTRA-ARTICULAR; INTRALESIONAL; INTRAMUSCULAR; INTRAVENOUS; SOFT TISSUE PRN
Status: DISCONTINUED | OUTPATIENT
Start: 2024-08-27 | End: 2024-08-27

## 2024-08-27 RX ORDER — HYDROMORPHONE HCL IN WATER/PF 6 MG/30 ML
0.4 PATIENT CONTROLLED ANALGESIA SYRINGE INTRAVENOUS EVERY 5 MIN PRN
Status: DISCONTINUED | OUTPATIENT
Start: 2024-08-27 | End: 2024-08-27 | Stop reason: HOSPADM

## 2024-08-27 RX ORDER — NALOXONE HYDROCHLORIDE 0.4 MG/ML
0.1 INJECTION, SOLUTION INTRAMUSCULAR; INTRAVENOUS; SUBCUTANEOUS
Status: DISCONTINUED | OUTPATIENT
Start: 2024-08-27 | End: 2024-08-27 | Stop reason: HOSPADM

## 2024-08-27 RX ORDER — FENTANYL CITRATE 50 UG/ML
50 INJECTION, SOLUTION INTRAMUSCULAR; INTRAVENOUS EVERY 5 MIN PRN
Status: DISCONTINUED | OUTPATIENT
Start: 2024-08-27 | End: 2024-08-27 | Stop reason: HOSPADM

## 2024-08-27 RX ORDER — BUPIVACAINE HYDROCHLORIDE AND EPINEPHRINE 2.5; 5 MG/ML; UG/ML
INJECTION, SOLUTION INFILTRATION; PERINEURAL PRN
Status: DISCONTINUED | OUTPATIENT
Start: 2024-08-27 | End: 2024-08-27 | Stop reason: HOSPADM

## 2024-08-27 RX ORDER — LIDOCAINE HYDROCHLORIDE 20 MG/ML
INJECTION, SOLUTION INFILTRATION; PERINEURAL PRN
Status: DISCONTINUED | OUTPATIENT
Start: 2024-08-27 | End: 2024-08-27

## 2024-08-27 RX ORDER — INDOCYANINE GREEN AND WATER 25 MG
2.5 KIT INJECTION ONCE
Status: COMPLETED | OUTPATIENT
Start: 2024-08-27 | End: 2024-08-27

## 2024-08-27 RX ORDER — CEFAZOLIN SODIUM/WATER 2 G/20 ML
2 SYRINGE (ML) INTRAVENOUS
Status: COMPLETED | OUTPATIENT
Start: 2024-08-27 | End: 2024-08-27

## 2024-08-27 RX ORDER — DEXAMETHASONE SODIUM PHOSPHATE 4 MG/ML
4 INJECTION, SOLUTION INTRA-ARTICULAR; INTRALESIONAL; INTRAMUSCULAR; INTRAVENOUS; SOFT TISSUE
Status: DISCONTINUED | OUTPATIENT
Start: 2024-08-27 | End: 2024-08-27 | Stop reason: HOSPADM

## 2024-08-27 RX ORDER — SODIUM CHLORIDE, SODIUM LACTATE, POTASSIUM CHLORIDE, CALCIUM CHLORIDE 600; 310; 30; 20 MG/100ML; MG/100ML; MG/100ML; MG/100ML
INJECTION, SOLUTION INTRAVENOUS CONTINUOUS
Status: DISCONTINUED | OUTPATIENT
Start: 2024-08-27 | End: 2024-08-27 | Stop reason: HOSPADM

## 2024-08-27 RX ORDER — MAGNESIUM HYDROXIDE 1200 MG/15ML
LIQUID ORAL PRN
Status: DISCONTINUED | OUTPATIENT
Start: 2024-08-27 | End: 2024-08-27 | Stop reason: HOSPADM

## 2024-08-27 RX ORDER — HYDROCODONE BITARTRATE AND ACETAMINOPHEN 5; 325 MG/1; MG/1
1-2 TABLET ORAL EVERY 4 HOURS PRN
Qty: 10 TABLET | Refills: 0 | Status: SHIPPED | OUTPATIENT
Start: 2024-08-27

## 2024-08-27 RX ORDER — PROPOFOL 10 MG/ML
INJECTION, EMULSION INTRAVENOUS PRN
Status: DISCONTINUED | OUTPATIENT
Start: 2024-08-27 | End: 2024-08-27

## 2024-08-27 RX ORDER — ONDANSETRON 2 MG/ML
INJECTION INTRAMUSCULAR; INTRAVENOUS PRN
Status: DISCONTINUED | OUTPATIENT
Start: 2024-08-27 | End: 2024-08-27

## 2024-08-27 RX ORDER — ONDANSETRON 4 MG/1
4 TABLET, ORALLY DISINTEGRATING ORAL EVERY 30 MIN PRN
Status: DISCONTINUED | OUTPATIENT
Start: 2024-08-27 | End: 2024-08-27 | Stop reason: HOSPADM

## 2024-08-27 RX ORDER — FENTANYL CITRATE 50 UG/ML
25 INJECTION, SOLUTION INTRAMUSCULAR; INTRAVENOUS EVERY 5 MIN PRN
Status: DISCONTINUED | OUTPATIENT
Start: 2024-08-27 | End: 2024-08-27 | Stop reason: HOSPADM

## 2024-08-27 RX ORDER — IOPAMIDOL 612 MG/ML
INJECTION, SOLUTION INTRAVASCULAR PRN
Status: DISCONTINUED | OUTPATIENT
Start: 2024-08-27 | End: 2024-08-27 | Stop reason: HOSPADM

## 2024-08-27 RX ORDER — AMOXICILLIN 250 MG
1-2 CAPSULE ORAL 2 TIMES DAILY PRN
Qty: 15 TABLET | Refills: 0 | Status: SHIPPED | OUTPATIENT
Start: 2024-08-27

## 2024-08-27 RX ORDER — ONDANSETRON 2 MG/ML
4 INJECTION INTRAMUSCULAR; INTRAVENOUS EVERY 30 MIN PRN
Status: DISCONTINUED | OUTPATIENT
Start: 2024-08-27 | End: 2024-08-27 | Stop reason: HOSPADM

## 2024-08-27 RX ORDER — CEFAZOLIN SODIUM/WATER 2 G/20 ML
2 SYRINGE (ML) INTRAVENOUS SEE ADMIN INSTRUCTIONS
Status: DISCONTINUED | OUTPATIENT
Start: 2024-08-27 | End: 2024-08-27 | Stop reason: HOSPADM

## 2024-08-27 RX ORDER — FENTANYL CITRATE 50 UG/ML
INJECTION, SOLUTION INTRAMUSCULAR; INTRAVENOUS PRN
Status: DISCONTINUED | OUTPATIENT
Start: 2024-08-27 | End: 2024-08-27

## 2024-08-27 RX ORDER — KETOROLAC TROMETHAMINE 30 MG/ML
INJECTION, SOLUTION INTRAMUSCULAR; INTRAVENOUS PRN
Status: DISCONTINUED | OUTPATIENT
Start: 2024-08-27 | End: 2024-08-27

## 2024-08-27 RX ADMIN — ROCURONIUM BROMIDE 50 MG: 50 INJECTION, SOLUTION INTRAVENOUS at 11:23

## 2024-08-27 RX ADMIN — FENTANYL CITRATE 50 MCG: 50 INJECTION, SOLUTION INTRAMUSCULAR; INTRAVENOUS at 13:30

## 2024-08-27 RX ADMIN — ONDANSETRON 4 MG: 2 INJECTION INTRAMUSCULAR; INTRAVENOUS at 12:47

## 2024-08-27 RX ADMIN — SODIUM CHLORIDE, POTASSIUM CHLORIDE, SODIUM LACTATE AND CALCIUM CHLORIDE: 600; 310; 30; 20 INJECTION, SOLUTION INTRAVENOUS at 11:13

## 2024-08-27 RX ADMIN — HEPARIN SODIUM 5000 UNITS: 5000 INJECTION, SOLUTION INTRAVENOUS; SUBCUTANEOUS at 10:49

## 2024-08-27 RX ADMIN — HYDROMORPHONE HYDROCHLORIDE 0.5 MG: 1 INJECTION, SOLUTION INTRAMUSCULAR; INTRAVENOUS; SUBCUTANEOUS at 12:02

## 2024-08-27 RX ADMIN — FENTANYL CITRATE 50 MCG: 50 INJECTION INTRAMUSCULAR; INTRAVENOUS at 11:42

## 2024-08-27 RX ADMIN — KETOROLAC TROMETHAMINE 15 MG: 30 INJECTION, SOLUTION INTRAMUSCULAR at 12:49

## 2024-08-27 RX ADMIN — DEXAMETHASONE SODIUM PHOSPHATE 4 MG: 4 INJECTION, SOLUTION INTRA-ARTICULAR; INTRALESIONAL; INTRAMUSCULAR; INTRAVENOUS; SOFT TISSUE at 11:31

## 2024-08-27 RX ADMIN — DEXMEDETOMIDINE HYDROCHLORIDE 12 MCG: 200 INJECTION INTRAVENOUS at 11:46

## 2024-08-27 RX ADMIN — LIDOCAINE HYDROCHLORIDE 60 MG: 20 INJECTION, SOLUTION INFILTRATION; PERINEURAL at 12:49

## 2024-08-27 RX ADMIN — LIDOCAINE HYDROCHLORIDE 100 MG: 20 INJECTION, SOLUTION INFILTRATION; PERINEURAL at 11:23

## 2024-08-27 RX ADMIN — DEXMEDETOMIDINE HYDROCHLORIDE 8 MCG: 200 INJECTION INTRAVENOUS at 12:49

## 2024-08-27 RX ADMIN — SUGAMMADEX 200 MG: 100 INJECTION, SOLUTION INTRAVENOUS at 12:59

## 2024-08-27 RX ADMIN — ROCURONIUM BROMIDE 10 MG: 50 INJECTION, SOLUTION INTRAVENOUS at 12:31

## 2024-08-27 RX ADMIN — HYDROCODONE BITARTRATE AND ACETAMINOPHEN 1 TABLET: 5; 325 TABLET ORAL at 14:05

## 2024-08-27 RX ADMIN — INDOCYANINE GREEN AND WATER 2.5 MG: KIT at 10:44

## 2024-08-27 RX ADMIN — PROPOFOL 200 MG: 10 INJECTION, EMULSION INTRAVENOUS at 11:23

## 2024-08-27 RX ADMIN — FENTANYL CITRATE 50 MCG: 50 INJECTION INTRAMUSCULAR; INTRAVENOUS at 11:18

## 2024-08-27 RX ADMIN — Medication 2 G: at 11:18

## 2024-08-27 RX ADMIN — ROCURONIUM BROMIDE 20 MG: 50 INJECTION, SOLUTION INTRAVENOUS at 11:49

## 2024-08-27 RX ADMIN — SODIUM CHLORIDE, POTASSIUM CHLORIDE, SODIUM LACTATE AND CALCIUM CHLORIDE: 600; 310; 30; 20 INJECTION, SOLUTION INTRAVENOUS at 13:38

## 2024-08-27 RX ADMIN — PHENYLEPHRINE HYDROCHLORIDE 100 MCG: 10 INJECTION INTRAVENOUS at 11:29

## 2024-08-27 ASSESSMENT — ACTIVITIES OF DAILY LIVING (ADL)
ADLS_ACUITY_SCORE: 37

## 2024-08-27 ASSESSMENT — COPD QUESTIONNAIRES: COPD: 0

## 2024-08-27 ASSESSMENT — LIFESTYLE VARIABLES: TOBACCO_USE: 1

## 2024-08-27 NOTE — ANESTHESIA POSTPROCEDURE EVALUATION
Patient: Talha Alexander    Procedure: Procedure(s):  Robotic cholecystectomy, with cholangiogram       Anesthesia Type:  General    Note:  Disposition: Outpatient   Postop Pain Control: Uneventful            Sign Out: Well controlled pain   PONV:    Neuro/Psych: Uneventful            Sign Out: Acceptable/Baseline neuro status   Airway/Respiratory: Uneventful            Sign Out: Acceptable/Baseline resp. status   CV/Hemodynamics: Uneventful            Sign Out: Acceptable CV status   Other NRE: NONE   DID A NON-ROUTINE EVENT OCCUR? No           Last vitals:  Vitals Value Taken Time   /80 08/27/24 1405   Temp 36.2  C (97.2  F) 08/27/24 1345   Pulse 75 08/27/24 1406   Resp 17 08/27/24 1406   SpO2 97 % 08/27/24 1407   Vitals shown include unfiled device data.    Electronically Signed By: Santo Rizzo MD  August 27, 2024  2:32 PM

## 2024-08-27 NOTE — OP NOTE
General Surgery Operative Note    PREOPERATIVE DIAGNOSIS:  severe gallstone pancreatitis.    POSTOPERATIVE DIAGNOSIS:  severe gallstone pancreatitis.    PROCEDURE:  Robotic cholecystectomy with cholangiogram    SURGEON:  Leno Fischer MD    ASSISTANT:  Dahlia López PA-C  The physician s assistant was medically necessary for their expertise in camera management, suctioning and retraction.      ANESTHESIA:  General.    BLOOD LOSS: 20 ml    FINDINGS: Inflammatory adhesions in the LUQ.  Some inflammation of the infundibulum.  Cholangiogram with good flow proximal and distal with no filling defects.       INDICATIONS:   Mr. Alexander presented with severe gallstone pancreatitis.  He was in the ICU for weeks.  He has had numerous ERCPs with a CBD stent which as since been removed.  He developed a large pancreatic pseudocyst which was managed endoscopically via a endoscopic cyst-gastrostomy.  He has steadily improved.  We had a plan to get his gallbladder out when things settled down.  Things have now settled down. Patient is presenting for a robotic cholecystectomy with cholangiogram. I explained the risks, benefits, complications including but not limited to bleeding, infection, possible need to open, possible postop hematoma, seroma, bowel, bladder or bile duct injury, MI, PE, and if any of these occurred the patient would require additional procedures. The patient agreed and did sign consent.    DETAILS OF PROCEDURE:   The patient was taken to the operating room and general anesthesia induced. They were positioned supine on the OR table with their left arm tucked at their side. An orogastric tube was placed. Perioperative antibiotics were administered. The patient received ICG florescence prior to going back to the operating room.  The abdomen was prepped and draped in standard fashion. A time out was performed.     The abdomen was entered from a left upper quadrant incision.  Using a 5 mm scope and camera and an 8 mm  reusable robotic port the abdomen was entered under direct vision.  Three more 8 mm ports were placed across the abdomen.  Each was placed by first injecting 0.25% marcaine, making an incision and placing the port under direct vision from inside the abdomen. The robot was docked.  Two prograsps and a hook cautery were placed.    The gallbladder was retracted superiorly and laterally. The gallbladder wall was mildly thickened and edematous especially down at the bottom. There were omental adhesions to the gallbladder which were carefully taken down.  Cautery was used to take down the medial and lateral peritoneal attachments in the infundibulum.  A large critical view was created and Firefly was helpful in seeing the common duct and confirming that no accessory ducts were up on the gallbladder.  I was able to delineate the cystic artery and cystic duct and got under the undersurface of the gallbladder to help further declinate the duct and artery.     The cystic artery was clipped and cut.  A single clip was placed between the cystic duct and gallbladder.  A 14 gauge angiocath was placed through the abdominal wall.  The Taut cholangiocatheter was placed.  A ductotomy was made with the mono-polar scissors.  Using the clip applier without a clip the catheter was placed into the cystic duct.  It was secured with a clip.  A cholangiogram was done.  Please see the saved pictures.  Good flow of contrast into the hepatic ducts and distal into the duodenum was seen.  No filling defects were seen.  The clip was cut and the catheter was removed.  Two additional clips were placed proximal to the ductotomy and the duct was transected.      The gallbladder was taken off the liver bed with cautery. There was no bile spillage or significant bleeding. The gallbladder was then placed in an Endocatch bag and removed through the left lateral port site. A suture was introduced down the lateral port site which was replaced through the  fascial defect.  The facial defect was then closed with an inside to out, out to inside technique.  The suture was then pulled out the port, the port was removed and the suture was tied from the outside, closing the transversalis fascia.  The wound bed was inspected multiple times showing that it was completely dry and that the clips were in place. The omentum was packed into the perihepatic fossa. All gas was expelled and the trocars were taken out under direct visualization. Marcaine 0.25% were injected to all the wounds. The skin was closed with a 4-0 Monocryl in a subcuticular fashion. Steri-strips and sterile dressings were applied. The patient tolerated the procedure well. There were no complications. They were awoken from anesthesia and transferred to PACU in stable condition. All sponge, instrument and needle counts were correct.       Leno Fischer MD    Please route or send letter to:  Primary Care Provider (PCP) and Referring Provider

## 2024-08-27 NOTE — ANESTHESIA PROCEDURE NOTES
Airway       Patient location during procedure: OR  Staff -        Anesthesiologist:  Santo Rizzo MD       CRNA: Blanquita Valverde       Performed By: CRNA  Consent for Airway        Urgency: elective  Indications and Patient Condition       Indications for airway management: rafael-procedural       Induction type:intravenous       Mask difficulty assessment: 2 - vent by mask + OA or adjuvant +/- NMBA    Final Airway Details       Final airway type: endotracheal airway       Successful airway: ETT - single and Oral  Endotracheal Airway Details        ETT size (mm): 8.0       Cuffed: yes       Successful intubation technique: direct laryngoscopy       DL Blade Type: MAC 4       Grade View of Cords: 1       Adjucts: stylet       Position: Right       Measured from: gums/teeth       Secured at (cm): 21       Bite block used: None    Post intubation assessment        Placement verified by: capnometry, equal breath sounds and chest rise        Number of attempts at approach: 1       Number of other approaches attempted: 0       Secured with: tape       Ease of procedure: easy       Dentition: Unchanged and Intact

## 2024-08-27 NOTE — ANESTHESIA PREPROCEDURE EVALUATION
Anesthesia Pre-Procedure Evaluation    Patient: Talha Alexander   MRN: [id number] : 1975        Procedure : Procedure(s):  Robotic cholecystectomy, with cholangiogram          Past Medical History:   Diagnosis Date    BRENDA (acute kidney injury) (H24)     Anemia due to blood loss, acute     Calculus of gallbladder     Choledocholithiasis     Functional asplenia     Gallstone pancreatitis     Gastric varices without bleeding     Hypertension     Necrotizing pancreatitis     Other ascites     Pancreatic pseudocyst     Splenic infarction     Thrombocytosis       Past Surgical History:   Procedure Laterality Date    ENDOSCOPIC RETROGRADE CHOLANGIOPANCREATOGRAM N/A 2024    Procedure: Endoscopic retrograde cholangiopancreatogram with stent placement;  Surgeon: Carlos Wilson MD;  Location:  OR    ENDOSCOPIC RETROGRADE CHOLANGIOPANCREATOGRAM N/A 2024    Procedure: Endoscopic retrograde cholangiopancreatogram, Stent removal,biliary dilation and stone extraction;  Surgeon: Avani Barajas MD;  Location:  OR    ENDOSCOPIC US UPPER W/ CYST HUE & COIL EMBOLIZATION OF THE SPLENIC ARTERY  2024      Allergies   Allergen Reactions    Peanut-Containing Drug Products Anaphylaxis and Swelling    Tree Nuts [Nuts] Difficulty breathing     Respiratory distress      Social History     Tobacco Use    Smoking status: Every Day     Current packs/day: 0.25     Average packs/day: 0.3 packs/day for 30.7 years (7.7 ttl pk-yrs)     Types: Cigarettes     Start date:     Smokeless tobacco: Never   Substance Use Topics    Alcohol use: Yes     Comment: occasional  once a mo      Wt Readings from Last 1 Encounters:   24 81 kg (178 lb 8 oz)        Anesthesia Evaluation            ROS/MED HX  ENT/Pulmonary:     (+)                tobacco use, Current use,                    (-) asthma, COPD and sleep apnea   Neurologic:  - neg neurologic ROS     Cardiovascular:     (+)  hypertension- -   -  - -      "                              (-) CAD and dyslipidemia   METS/Exercise Tolerance: >4 METS    Hematologic:  - neg hematologic  ROS     Musculoskeletal:  - neg musculoskeletal ROS     GI/Hepatic: Comment: H/O pancreatitis    (+)          cholecystitis/cholelithiasis,          Renal/Genitourinary:     (+) renal disease, type: ARF, Pt does not require dialysis,           Endo:  - neg endo ROS     Psychiatric/Substance Use:  - neg psychiatric ROS     Infectious Disease:       Malignancy:       Other:               OUTSIDE LABS:  CBC:   Lab Results   Component Value Date    WBC 7.1 03/07/2024    WBC 8.0 03/06/2024    HGB 10.5 (L) 03/07/2024    HGB 12.4 (L) 03/06/2024    HCT 32.1 (L) 03/07/2024    HCT 38.9 (L) 03/06/2024     03/07/2024     03/06/2024     BMP:   Lab Results   Component Value Date     03/07/2024     03/06/2024    POTASSIUM 4.0 03/07/2024    POTASSIUM 4.1 03/06/2024    CHLORIDE 104 03/07/2024    CHLORIDE 104 03/06/2024    CO2 25 03/07/2024    CO2 24 03/06/2024    BUN 9.2 03/07/2024    BUN 14.7 03/06/2024    CR 0.88 03/07/2024    CR 0.97 03/06/2024    GLC 93 03/07/2024    GLC 91 03/06/2024     COAGS: No results found for: \"PTT\", \"INR\", \"FIBR\"  POC: No results found for: \"BGM\", \"HCG\", \"HCGS\"  HEPATIC:   Lab Results   Component Value Date    ALBUMIN 3.3 (L) 03/07/2024    PROTTOTAL 5.9 (L) 03/07/2024    ALT 13 03/07/2024    AST 12 03/07/2024    ALKPHOS 66 03/07/2024    BILITOTAL 1.0 03/07/2024    RIAN 14 (L) 01/13/2024     OTHER:   Lab Results   Component Value Date    LACT 1.0 01/21/2024    LUBNA 8.9 03/07/2024    PHOS 5.3 (H) 01/15/2024    MAG 1.7 01/26/2024    LIPASE 221 (H) 03/06/2024       Anesthesia Plan    ASA Status:  2    NPO Status:  NPO Appropriate    Anesthesia Type: General.     - Airway: ETT   Induction: Intravenous, Propofol.   Maintenance: Balanced.        Consents    Anesthesia Plan(s) and associated risks, benefits, and realistic alternatives discussed. Questions " answered and patient/representative(s) expressed understanding.     - Discussed:     - Discussed with:  Patient      - Extended Intubation/Ventilatory Support Discussed: No.      - Patient is DNR/DNI Status: No     Use of blood products discussed: No .     Postoperative Care    Pain management: IV analgesics, Multi-modal analgesia.   PONV prophylaxis: Ondansetron (or other 5HT-3), Dexamethasone or Solumedrol     Comments:               Santo Rizzo MD    I have reviewed the pertinent notes and labs in the chart from the past 30 days and (re)examined the patient.  Any updates or changes from those notes are reflected in this note.

## 2024-08-27 NOTE — ANESTHESIA CARE TRANSFER NOTE
Patient: Talha Alexander    Procedure: Procedure(s):  Robotic cholecystectomy, with cholangiogram       Diagnosis: Gallstone pancreatitis [K85.10]  Diagnosis Additional Information: No value filed.    Anesthesia Type:   General     Note:    Oropharynx: oropharynx clear of all foreign objects and spontaneously breathing  Level of Consciousness: awake  Oxygen Supplementation: room air    Independent Airway: airway patency satisfactory and stable  Dentition: dentition unchanged  Vital Signs Stable: post-procedure vital signs reviewed and stable  Report to RN Given: handoff report given  Patient transferred to: PACU    Handoff Report: Identifed the Patient, Identified the Reponsible Provider, Reviewed the pertinent medical history, Discussed the surgical course, Reviewed Intra-OP anesthesia mangement and issues during anesthesia, Set expectations for post-procedure period and Allowed opportunity for questions and acknowledgement of understanding      Vitals:  Vitals Value Taken Time   /84 08/27/24 1308   Temp     Pulse 81 08/27/24 1310   Resp 13 08/27/24 1310   SpO2 97 % 08/27/24 1310   Vitals shown include unfiled device data.    Electronically Signed By: Blanquita Valverde  August 27, 2024  1:11 PM

## 2024-08-27 NOTE — DISCHARGE INSTRUCTIONS
[facility]  Discharge Instructions: Post-Operative Cholecystectomy    ACTIVITY  Expect to feel tired after your surgery.  This will gradually resolve.    Take frequent, short walks and increase your activity gradually.    Avoid strenuous physical activity or heavy lifting greater than 15-20 lbs. for 2-3 weeks.  You may climb stairs.  You may drive without restrictions when you are not using any prescription pain medication and feel comfortable in a car.  You may return to work/school when you are comfortable without any prescription pain medication.    WOUND CARE  You may remove your outer dressing or Band-Aids and shower 48 hours after the surgery.  Pat your incisions dry and leave them open to air.  Re-apply dressing (Band-Aids or gauze/tape) as needed for comfort or drainage.  You may have steri-strips (looks like white tape) on your incision.  You may peel off the steri-strips 2 weeks after your surgery if they have not peeled off on their own.  Do not soak your incisions in a tub or pool for 2 weeks.   Do not apply any lotions, creams, or ointments to your incisions.  A ridge under your incisions is normal and will gradually resolve.    DIET  Start with liquids, then gradually resume your regular diet as tolerated.  Avoid heavy, spicy, and greasy meals for 2-3 days.  Drink plenty of fluids to stay hydrated.  It is not uncommon to experience some loose stools or diarrhea after surgery.  This is your body's way of adapting to the bile which will slowly drain into your intestine.  A low fat diet may help with this.  This should improve over 1-2 months.    PAIN  Expect some tenderness and discomfort at the incision sites.  Use the prescribed pain medication at your discretion.  Expect gradual resolution of your pain over several days.  You may take ibuprofen with food (unless you have been told not to) or acetaminophen/Tylenol instead of or in addition to your prescribed pain  medication.  However, if you are taking Norco, do not take any additional acetaminophen/Tylenol.  Do not drink alcohol or drive while you are taking pain medications.  You may apply ice to your incisions in 20 minute intervals as needed for the next 48 hours.  After that time, consider switching to heat if you prefer.    EXPECTATIONS  Pain medications can cause constipation.  Limit use when possible.  Take an over the counter or prescribed stool softener/stimulant, such as Senna-Docusate, 1-2 times a day with plenty of water.  You may take a mild over the counter laxative, such as Miralax or a Dulcolax suppository, as needed.  You may discontinue these medications once you are having regular bowel movements and/or are no longer taking your narcotic pain medication.    You may have shoulder or upper back discomfort due to the gas used in surgery.  This is temporary and should resolve in 48-72 hours.  Short, frequent walks may help with this.  If you are unable to urinate for 8 hours or feel as though you are not emptying your bladder adequately, we recommend you seek care at an ER or Urgent Care facility for possible catheter placement.     FOLLOW UP  Our office will contact you in approximately 2-3 weeks to check on your progress and answer any questions you may have.  If you are doing well, you will not need to return for a follow up appointment.  If any concerns are identified over the phone, we will help you make an appointment to see a provider.   If you have not received a phone call, have any questions or concerns, or would like to be seen, please call us at [phone] and ask to speak with our nurse.  We are located at [address] [place] [address].    CALL OUR OFFICE -[phone] IF YOU HAVE:   Chills or fever above 101 F.  Increased redness, warmth, or drainage at your incisions.  Significant bleeding.  Pain not relieved by your pain medication or rest.  Increasing pain after the  first 48 hours.  Any other concerns or questions.     Same Day Surgery Discharge Instructions for  Sedation and General Anesthesia     It's not unusual to feel dizzy, light-headed or faint for up to 24 hours after surgery or while taking pain medication.  If you have these symptoms: sit for a few minutes before standing and have someone assist you when you get up to walk or use the bathroom.    You should rest and relax for the next 24 hours. We recommend you make arrangements to have an adult stay with you for at least 24 hours after your discharge.  Avoid hazardous and strenuous activity.    DO NOT DRIVE any vehicle or operate mechanical equipment for 24 hours following the end of your surgery.  Even though you may feel normal, your reactions may be affected by the medication you have received.    Do not drink alcoholic beverages for 24 hours following surgery.     Slowly progress to your regular diet as you feel able. It's not unusual to feel nauseated and/or vomit after receiving anesthesia.  If you develop these symptoms, drink clear liquids (apple juice, ginger ale, broth, 7-up, etc. ) until you feel better.  If your nausea and vomiting persists for 24 hours, please notify your surgeon.      All narcotic pain medications, along with inactivity and anesthesia, can cause constipation. Drinking plenty of liquids and increasing fiber intake will help.    For any questions of a medical nature, call your surgeon.    Do not make important decisions for 24 hours.    If you had general anesthesia, you may have a sore throat for a couple of days related to the breathing tube used during surgery.  You may use Cepacol lozenges to help with this discomfort.  If it worsens or if you develop a fever, contact your surgeon.     If you feel your pain is not well managed with the pain medications prescribed by your surgeon, please contact your surgeon's office to let them know so they can address your concerns.      Today you  received Toradol, an antiinflammatory medication similar to Ibuprofen.  You should not take other antiinflammatory medication, such as Ibuprofen, Motrin, Advil, Aleve, Naprosyn, etc until 6:45PM.

## 2024-08-28 LAB
PATH REPORT.COMMENTS IMP SPEC: NORMAL
PATH REPORT.COMMENTS IMP SPEC: NORMAL
PATH REPORT.FINAL DX SPEC: NORMAL
PATH REPORT.GROSS SPEC: NORMAL
PATH REPORT.MICROSCOPIC SPEC OTHER STN: NORMAL
PATH REPORT.RELEVANT HX SPEC: NORMAL
PHOTO IMAGE: NORMAL

## 2024-09-11 ENCOUNTER — TELEPHONE (OUTPATIENT)
Dept: SURGERY | Facility: CLINIC | Age: 49
End: 2024-09-11
Payer: COMMERCIAL

## 2024-09-11 NOTE — TELEPHONE ENCOUNTER
[facility]  Post op call note  September 11, 2024       Talha Alexander was called for an update regarding his recovery.  There was no answer and a message was left encouraging him to call us back with any questions, concerns, or to provide an update.        Dahlia López PA-C  [facility]  [phone]

## 2025-03-16 ENCOUNTER — HEALTH MAINTENANCE LETTER (OUTPATIENT)
Age: 50
End: 2025-03-16

## 2025-08-23 ENCOUNTER — HOSPITAL ENCOUNTER (INPATIENT)
Facility: CLINIC | Age: 50
LOS: 1 days | Discharge: HOME | End: 2025-08-26
Attending: EMERGENCY MEDICINE | Admitting: INTERNAL MEDICINE
Payer: COMMERCIAL

## 2025-08-23 DIAGNOSIS — R10.13 EPIGASTRIC PAIN: ICD-10-CM

## 2025-08-23 DIAGNOSIS — K29.80 DUODENITIS: Primary | ICD-10-CM

## 2025-08-23 LAB
ALBUMIN SERPL BCG-MCNC: 4.3 G/DL (ref 3.5–5.2)
ALP SERPL-CCNC: 77 U/L (ref 40–150)
ALT SERPL W P-5'-P-CCNC: 12 U/L (ref 0–70)
ANION GAP SERPL CALCULATED.3IONS-SCNC: 12 MMOL/L (ref 7–15)
AST SERPL W P-5'-P-CCNC: 14 U/L (ref 0–45)
BASOPHILS # BLD AUTO: 0.08 10E3/UL (ref 0–0.2)
BASOPHILS NFR BLD AUTO: 0.7 %
BILIRUB SERPL-MCNC: 1 MG/DL
BUN SERPL-MCNC: 15.8 MG/DL (ref 6–20)
CALCIUM SERPL-MCNC: 9.6 MG/DL (ref 8.8–10.4)
CHLORIDE SERPL-SCNC: 103 MMOL/L (ref 98–107)
CREAT SERPL-MCNC: 0.84 MG/DL (ref 0.67–1.17)
EGFRCR SERPLBLD CKD-EPI 2021: >90 ML/MIN/1.73M2
EOSINOPHIL # BLD AUTO: 0.51 10E3/UL (ref 0–0.7)
EOSINOPHIL NFR BLD AUTO: 4.2 %
ERYTHROCYTE [DISTWIDTH] IN BLOOD BY AUTOMATED COUNT: 14.3 % (ref 10–15)
GLUCOSE SERPL-MCNC: 119 MG/DL (ref 70–99)
HCO3 SERPL-SCNC: 23 MMOL/L (ref 22–29)
HCT VFR BLD AUTO: 43.4 % (ref 40–53)
HGB BLD-MCNC: 15.2 G/DL (ref 13.3–17.7)
HOLD SPECIMEN: NORMAL
IMM GRANULOCYTES # BLD: 0.03 10E3/UL
IMM GRANULOCYTES NFR BLD: 0.2 %
LIPASE SERPL-CCNC: 489 U/L (ref 13–60)
LYMPHOCYTES # BLD AUTO: 1.91 10E3/UL (ref 0.8–5.3)
LYMPHOCYTES NFR BLD AUTO: 15.7 %
MCH RBC QN AUTO: 29.3 PG (ref 26.5–33)
MCHC RBC AUTO-ENTMCNC: 35 G/DL (ref 31.5–36.5)
MCV RBC AUTO: 83.8 FL (ref 78–100)
MONOCYTES # BLD AUTO: 1.35 10E3/UL (ref 0–1.3)
MONOCYTES NFR BLD AUTO: 11.1 %
NEUTROPHILS # BLD AUTO: 8.25 10E3/UL (ref 1.6–8.3)
NEUTROPHILS NFR BLD AUTO: 68.1 %
NRBC # BLD AUTO: <0.03 10E3/UL
NRBC BLD AUTO-RTO: 0 /100
PLATELET # BLD AUTO: 229 10E3/UL (ref 150–450)
POTASSIUM SERPL-SCNC: 4.1 MMOL/L (ref 3.4–5.3)
PROT SERPL-MCNC: 7.7 G/DL (ref 6.4–8.3)
RBC # BLD AUTO: 5.18 10E6/UL (ref 4.4–5.9)
SODIUM SERPL-SCNC: 138 MMOL/L (ref 135–145)
WBC # BLD AUTO: 12.13 10E3/UL (ref 4–11)

## 2025-08-23 PROCEDURE — 85004 AUTOMATED DIFF WBC COUNT: CPT | Performed by: EMERGENCY MEDICINE

## 2025-08-23 PROCEDURE — 83690 ASSAY OF LIPASE: CPT | Performed by: EMERGENCY MEDICINE

## 2025-08-23 PROCEDURE — 80053 COMPREHEN METABOLIC PANEL: CPT | Performed by: EMERGENCY MEDICINE

## 2025-08-23 PROCEDURE — 85025 COMPLETE CBC W/AUTO DIFF WBC: CPT | Performed by: EMERGENCY MEDICINE

## 2025-08-23 PROCEDURE — 99285 EMERGENCY DEPT VISIT HI MDM: CPT | Mod: 25 | Performed by: EMERGENCY MEDICINE

## 2025-08-23 PROCEDURE — 36415 COLL VENOUS BLD VENIPUNCTURE: CPT | Performed by: EMERGENCY MEDICINE

## 2025-08-23 ASSESSMENT — ACTIVITIES OF DAILY LIVING (ADL)
ADLS_ACUITY_SCORE: 58

## 2025-08-24 ENCOUNTER — APPOINTMENT (OUTPATIENT)
Dept: CT IMAGING | Facility: CLINIC | Age: 50
End: 2025-08-24
Attending: EMERGENCY MEDICINE
Payer: COMMERCIAL

## 2025-08-24 PROBLEM — K29.80 DUODENITIS: Status: ACTIVE | Noted: 2025-08-24

## 2025-08-24 LAB
ALBUMIN UR-MCNC: 20 MG/DL
ANION GAP SERPL CALCULATED.3IONS-SCNC: 8 MMOL/L (ref 7–15)
APPEARANCE UR: CLEAR
BILIRUB UR QL STRIP: NEGATIVE
BUN SERPL-MCNC: 15.7 MG/DL (ref 6–20)
CALCIUM SERPL-MCNC: 8.6 MG/DL (ref 8.8–10.4)
CHLORIDE SERPL-SCNC: 103 MMOL/L (ref 98–107)
COLOR UR AUTO: YELLOW
CREAT SERPL-MCNC: 0.86 MG/DL (ref 0.67–1.17)
EGFRCR SERPLBLD CKD-EPI 2021: >90 ML/MIN/1.73M2
ERYTHROCYTE [DISTWIDTH] IN BLOOD BY AUTOMATED COUNT: 14.6 % (ref 10–15)
GLUCOSE SERPL-MCNC: 101 MG/DL (ref 70–99)
GLUCOSE UR STRIP-MCNC: NEGATIVE MG/DL
HCO3 SERPL-SCNC: 26 MMOL/L (ref 22–29)
HCT VFR BLD AUTO: 38.2 % (ref 40–53)
HGB BLD-MCNC: 13.2 G/DL (ref 13.3–17.7)
HGB UR QL STRIP: NEGATIVE
KETONES UR STRIP-MCNC: NEGATIVE MG/DL
LEUKOCYTE ESTERASE UR QL STRIP: NEGATIVE
MCH RBC QN AUTO: 29.6 PG (ref 26.5–33)
MCHC RBC AUTO-ENTMCNC: 34.6 G/DL (ref 31.5–36.5)
MCV RBC AUTO: 85.7 FL (ref 78–100)
MUCOUS THREADS #/AREA URNS LPF: PRESENT /LPF
NITRATE UR QL: NEGATIVE
PH UR STRIP: 5.5 [PH] (ref 5–7)
PLATELET # BLD AUTO: 179 10E3/UL (ref 150–450)
POTASSIUM SERPL-SCNC: 4.2 MMOL/L (ref 3.4–5.3)
RBC # BLD AUTO: 4.46 10E6/UL (ref 4.4–5.9)
RBC URINE: 1 /HPF
SODIUM SERPL-SCNC: 137 MMOL/L (ref 135–145)
SP GR UR STRIP: 1.03 (ref 1–1.03)
SQUAMOUS EPITHELIAL: <1 /HPF
UROBILINOGEN UR STRIP-MCNC: NORMAL MG/DL
WBC # BLD AUTO: 10.16 10E3/UL (ref 4–11)
WBC URINE: 2 /HPF

## 2025-08-24 PROCEDURE — 258N000003 HC RX IP 258 OP 636: Performed by: EMERGENCY MEDICINE

## 2025-08-24 PROCEDURE — 250N000009 HC RX 250: Performed by: EMERGENCY MEDICINE

## 2025-08-24 PROCEDURE — 250N000011 HC RX IP 250 OP 636: Performed by: EMERGENCY MEDICINE

## 2025-08-24 PROCEDURE — 96361 HYDRATE IV INFUSION ADD-ON: CPT

## 2025-08-24 PROCEDURE — G0378 HOSPITAL OBSERVATION PER HR: HCPCS

## 2025-08-24 PROCEDURE — 96375 TX/PRO/DX INJ NEW DRUG ADDON: CPT

## 2025-08-24 PROCEDURE — 250N000013 HC RX MED GY IP 250 OP 250 PS 637: Performed by: EMERGENCY MEDICINE

## 2025-08-24 PROCEDURE — 258N000003 HC RX IP 258 OP 636: Performed by: HOSPITALIST

## 2025-08-24 PROCEDURE — 36415 COLL VENOUS BLD VENIPUNCTURE: CPT | Performed by: HOSPITALIST

## 2025-08-24 PROCEDURE — 74177 CT ABD & PELVIS W/CONTRAST: CPT

## 2025-08-24 PROCEDURE — 250N000011 HC RX IP 250 OP 636: Performed by: HOSPITALIST

## 2025-08-24 PROCEDURE — 99222 1ST HOSP IP/OBS MODERATE 55: CPT | Performed by: HOSPITALIST

## 2025-08-24 PROCEDURE — 80048 BASIC METABOLIC PNL TOTAL CA: CPT | Performed by: HOSPITALIST

## 2025-08-24 PROCEDURE — 250N000013 HC RX MED GY IP 250 OP 250 PS 637: Performed by: HOSPITALIST

## 2025-08-24 PROCEDURE — 85018 HEMOGLOBIN: CPT | Performed by: HOSPITALIST

## 2025-08-24 PROCEDURE — 99207 PR NO BILLABLE SERVICE THIS VISIT: CPT | Performed by: HOSPITALIST

## 2025-08-24 PROCEDURE — 255N000002 HC RX 255 OP 636: Performed by: EMERGENCY MEDICINE

## 2025-08-24 PROCEDURE — 81001 URINALYSIS AUTO W/SCOPE: CPT | Performed by: EMERGENCY MEDICINE

## 2025-08-24 PROCEDURE — 96376 TX/PRO/DX INJ SAME DRUG ADON: CPT

## 2025-08-24 PROCEDURE — 96374 THER/PROPH/DIAG INJ IV PUSH: CPT

## 2025-08-24 RX ORDER — ACETAMINOPHEN 325 MG/1
650 TABLET ORAL EVERY 4 HOURS PRN
Status: DISCONTINUED | OUTPATIENT
Start: 2025-08-24 | End: 2025-08-26 | Stop reason: HOSPADM

## 2025-08-24 RX ORDER — ACETAMINOPHEN 650 MG/1
650 SUPPOSITORY RECTAL EVERY 4 HOURS PRN
Status: DISCONTINUED | OUTPATIENT
Start: 2025-08-24 | End: 2025-08-26 | Stop reason: HOSPADM

## 2025-08-24 RX ORDER — NALOXONE HYDROCHLORIDE 0.4 MG/ML
0.2 INJECTION, SOLUTION INTRAMUSCULAR; INTRAVENOUS; SUBCUTANEOUS
Status: DISCONTINUED | OUTPATIENT
Start: 2025-08-24 | End: 2025-08-26 | Stop reason: HOSPADM

## 2025-08-24 RX ORDER — NALOXONE HYDROCHLORIDE 0.4 MG/ML
0.4 INJECTION, SOLUTION INTRAMUSCULAR; INTRAVENOUS; SUBCUTANEOUS
Status: DISCONTINUED | OUTPATIENT
Start: 2025-08-24 | End: 2025-08-26 | Stop reason: HOSPADM

## 2025-08-24 RX ORDER — HYDROMORPHONE HYDROCHLORIDE 1 MG/ML
0.5 INJECTION, SOLUTION INTRAMUSCULAR; INTRAVENOUS; SUBCUTANEOUS ONCE
Refills: 0 | Status: COMPLETED | OUTPATIENT
Start: 2025-08-24 | End: 2025-08-24

## 2025-08-24 RX ORDER — AMOXICILLIN 250 MG
1 CAPSULE ORAL 2 TIMES DAILY PRN
Status: DISCONTINUED | OUTPATIENT
Start: 2025-08-24 | End: 2025-08-26 | Stop reason: HOSPADM

## 2025-08-24 RX ORDER — SODIUM CHLORIDE, SODIUM LACTATE, POTASSIUM CHLORIDE, CALCIUM CHLORIDE 600; 310; 30; 20 MG/100ML; MG/100ML; MG/100ML; MG/100ML
INJECTION, SOLUTION INTRAVENOUS CONTINUOUS
Status: DISCONTINUED | OUTPATIENT
Start: 2025-08-24 | End: 2025-08-26 | Stop reason: HOSPADM

## 2025-08-24 RX ORDER — OXYCODONE HYDROCHLORIDE 5 MG/1
10 TABLET ORAL EVERY 4 HOURS PRN
Status: DISCONTINUED | OUTPATIENT
Start: 2025-08-24 | End: 2025-08-26 | Stop reason: HOSPADM

## 2025-08-24 RX ORDER — PROCHLORPERAZINE MALEATE 10 MG
10 TABLET ORAL EVERY 6 HOURS PRN
Status: DISCONTINUED | OUTPATIENT
Start: 2025-08-24 | End: 2025-08-26 | Stop reason: HOSPADM

## 2025-08-24 RX ORDER — PANTOPRAZOLE SODIUM 40 MG/1
40 TABLET, DELAYED RELEASE ORAL
Status: DISCONTINUED | OUTPATIENT
Start: 2025-08-24 | End: 2025-08-26 | Stop reason: HOSPADM

## 2025-08-24 RX ORDER — MAGNESIUM HYDROXIDE/ALUMINUM HYDROXICE/SIMETHICONE 120; 1200; 1200 MG/30ML; MG/30ML; MG/30ML
30 SUSPENSION ORAL ONCE
Status: COMPLETED | OUTPATIENT
Start: 2025-08-24 | End: 2025-08-24

## 2025-08-24 RX ORDER — AMOXICILLIN 250 MG
2 CAPSULE ORAL 2 TIMES DAILY PRN
Status: DISCONTINUED | OUTPATIENT
Start: 2025-08-24 | End: 2025-08-26 | Stop reason: HOSPADM

## 2025-08-24 RX ORDER — HYDROMORPHONE HCL IN WATER/PF 6 MG/30 ML
0.4 PATIENT CONTROLLED ANALGESIA SYRINGE INTRAVENOUS
Status: DISCONTINUED | OUTPATIENT
Start: 2025-08-24 | End: 2025-08-26 | Stop reason: HOSPADM

## 2025-08-24 RX ORDER — HYDROMORPHONE HCL IN WATER/PF 6 MG/30 ML
0.2 PATIENT CONTROLLED ANALGESIA SYRINGE INTRAVENOUS
Status: DISCONTINUED | OUTPATIENT
Start: 2025-08-24 | End: 2025-08-26 | Stop reason: HOSPADM

## 2025-08-24 RX ORDER — OXYCODONE HYDROCHLORIDE 5 MG/1
5 TABLET ORAL EVERY 4 HOURS PRN
Status: DISCONTINUED | OUTPATIENT
Start: 2025-08-24 | End: 2025-08-26 | Stop reason: HOSPADM

## 2025-08-24 RX ORDER — HYDROMORPHONE HYDROCHLORIDE 1 MG/ML
0.5 INJECTION, SOLUTION INTRAMUSCULAR; INTRAVENOUS; SUBCUTANEOUS
Status: DISCONTINUED | OUTPATIENT
Start: 2025-08-24 | End: 2025-08-24

## 2025-08-24 RX ORDER — ONDANSETRON 4 MG/1
4 TABLET, ORALLY DISINTEGRATING ORAL EVERY 6 HOURS PRN
Status: DISCONTINUED | OUTPATIENT
Start: 2025-08-24 | End: 2025-08-26 | Stop reason: HOSPADM

## 2025-08-24 RX ORDER — ONDANSETRON 2 MG/ML
4 INJECTION INTRAMUSCULAR; INTRAVENOUS EVERY 6 HOURS PRN
Status: DISCONTINUED | OUTPATIENT
Start: 2025-08-24 | End: 2025-08-26 | Stop reason: HOSPADM

## 2025-08-24 RX ORDER — KETOROLAC TROMETHAMINE 15 MG/ML
15 INJECTION, SOLUTION INTRAMUSCULAR; INTRAVENOUS ONCE
Status: COMPLETED | OUTPATIENT
Start: 2025-08-24 | End: 2025-08-24

## 2025-08-24 RX ADMIN — HYDROMORPHONE HYDROCHLORIDE 0.4 MG: 0.2 INJECTION, SOLUTION INTRAMUSCULAR; INTRAVENOUS; SUBCUTANEOUS at 19:22

## 2025-08-24 RX ADMIN — PANTOPRAZOLE SODIUM 40 MG: 40 TABLET, DELAYED RELEASE ORAL at 16:46

## 2025-08-24 RX ADMIN — ACETAMINOPHEN 650 MG: 325 TABLET ORAL at 15:35

## 2025-08-24 RX ADMIN — PANTOPRAZOLE SODIUM 40 MG: 40 TABLET, DELAYED RELEASE ORAL at 08:23

## 2025-08-24 RX ADMIN — ALUMINUM HYDROXIDE, MAGNESIUM HYDROXIDE, AND SIMETHICONE 30 ML: 200; 200; 20 SUSPENSION ORAL at 04:50

## 2025-08-24 RX ADMIN — SODIUM CHLORIDE 70 ML: 9 INJECTION, SOLUTION INTRAVENOUS at 02:53

## 2025-08-24 RX ADMIN — SODIUM CHLORIDE 1000 ML: 0.9 INJECTION, SOLUTION INTRAVENOUS at 00:36

## 2025-08-24 RX ADMIN — PANTOPRAZOLE SODIUM 80 MG: 40 INJECTION, POWDER, FOR SOLUTION INTRAVENOUS at 04:50

## 2025-08-24 RX ADMIN — HYDROMORPHONE HYDROCHLORIDE 0.5 MG: 1 INJECTION, SOLUTION INTRAMUSCULAR; INTRAVENOUS; SUBCUTANEOUS at 05:49

## 2025-08-24 RX ADMIN — SODIUM CHLORIDE, SODIUM LACTATE, POTASSIUM CHLORIDE, AND CALCIUM CHLORIDE: .6; .31; .03; .02 INJECTION, SOLUTION INTRAVENOUS at 08:23

## 2025-08-24 RX ADMIN — KETOROLAC TROMETHAMINE 15 MG: 15 INJECTION, SOLUTION INTRAMUSCULAR; INTRAVENOUS at 00:36

## 2025-08-24 RX ADMIN — OXYCODONE HYDROCHLORIDE 10 MG: 5 TABLET ORAL at 15:35

## 2025-08-24 RX ADMIN — HYDROMORPHONE HYDROCHLORIDE 0.5 MG: 1 INJECTION, SOLUTION INTRAMUSCULAR; INTRAVENOUS; SUBCUTANEOUS at 04:50

## 2025-08-24 RX ADMIN — SODIUM CHLORIDE, SODIUM LACTATE, POTASSIUM CHLORIDE, AND CALCIUM CHLORIDE 1000 ML: .6; .31; .03; .02 INJECTION, SOLUTION INTRAVENOUS at 05:31

## 2025-08-24 RX ADMIN — OXYCODONE HYDROCHLORIDE 5 MG: 5 TABLET ORAL at 12:11

## 2025-08-24 RX ADMIN — IOHEXOL 107 ML: 350 INJECTION, SOLUTION INTRAVENOUS at 02:53

## 2025-08-24 ASSESSMENT — ACTIVITIES OF DAILY LIVING (ADL)
ADLS_ACUITY_SCORE: 58

## 2025-08-25 ENCOUNTER — ANESTHESIA (OUTPATIENT)
Dept: GASTROENTEROLOGY | Facility: CLINIC | Age: 50
End: 2025-08-25
Payer: COMMERCIAL

## 2025-08-25 ENCOUNTER — ANESTHESIA EVENT (OUTPATIENT)
Dept: GASTROENTEROLOGY | Facility: CLINIC | Age: 50
End: 2025-08-25
Payer: COMMERCIAL

## 2025-08-25 LAB
ANION GAP SERPL CALCULATED.3IONS-SCNC: 8 MMOL/L (ref 7–15)
BUN SERPL-MCNC: 11.6 MG/DL (ref 6–20)
CALCIUM SERPL-MCNC: 8.9 MG/DL (ref 8.8–10.4)
CHLORIDE SERPL-SCNC: 104 MMOL/L (ref 98–107)
CREAT SERPL-MCNC: 0.92 MG/DL (ref 0.67–1.17)
EGFRCR SERPLBLD CKD-EPI 2021: >90 ML/MIN/1.73M2
ERYTHROCYTE [DISTWIDTH] IN BLOOD BY AUTOMATED COUNT: 14.3 % (ref 10–15)
GLUCOSE BLDC GLUCOMTR-MCNC: 96 MG/DL (ref 70–99)
GLUCOSE SERPL-MCNC: 100 MG/DL (ref 70–99)
HCO3 SERPL-SCNC: 27 MMOL/L (ref 22–29)
HCT VFR BLD AUTO: 38.5 % (ref 40–53)
HGB BLD-MCNC: 12.9 G/DL (ref 13.3–17.7)
MAGNESIUM SERPL-MCNC: 2.1 MG/DL (ref 1.7–2.3)
MCH RBC QN AUTO: 28.9 PG (ref 26.5–33)
MCHC RBC AUTO-ENTMCNC: 33.5 G/DL (ref 31.5–36.5)
MCV RBC AUTO: 86.1 FL (ref 78–100)
PHOSPHATE SERPL-MCNC: 3.2 MG/DL (ref 2.5–4.5)
PLATELET # BLD AUTO: 175 10E3/UL (ref 150–450)
POTASSIUM SERPL-SCNC: 4.5 MMOL/L (ref 3.4–5.3)
RBC # BLD AUTO: 4.47 10E6/UL (ref 4.4–5.9)
SODIUM SERPL-SCNC: 139 MMOL/L (ref 135–145)
UPPER GI ENDOSCOPY: NORMAL
WBC # BLD AUTO: 6.37 10E3/UL (ref 4–11)

## 2025-08-25 PROCEDURE — 250N000011 HC RX IP 250 OP 636: Performed by: NURSE ANESTHETIST, CERTIFIED REGISTERED

## 2025-08-25 PROCEDURE — 99232 SBSQ HOSP IP/OBS MODERATE 35: CPT | Performed by: INTERNAL MEDICINE

## 2025-08-25 PROCEDURE — 120N000001 HC R&B MED SURG/OB

## 2025-08-25 PROCEDURE — 96361 HYDRATE IV INFUSION ADD-ON: CPT

## 2025-08-25 PROCEDURE — 85014 HEMATOCRIT: CPT | Performed by: HOSPITALIST

## 2025-08-25 PROCEDURE — 36415 COLL VENOUS BLD VENIPUNCTURE: CPT | Performed by: HOSPITALIST

## 2025-08-25 PROCEDURE — 250N000013 HC RX MED GY IP 250 OP 250 PS 637: Performed by: HOSPITALIST

## 2025-08-25 PROCEDURE — G0378 HOSPITAL OBSERVATION PER HR: HCPCS

## 2025-08-25 PROCEDURE — 84100 ASSAY OF PHOSPHORUS: CPT | Performed by: HOSPITALIST

## 2025-08-25 PROCEDURE — 43239 EGD BIOPSY SINGLE/MULTIPLE: CPT | Performed by: INTERNAL MEDICINE

## 2025-08-25 PROCEDURE — 250N000009 HC RX 250: Performed by: NURSE ANESTHETIST, CERTIFIED REGISTERED

## 2025-08-25 PROCEDURE — 88305 TISSUE EXAM BY PATHOLOGIST: CPT | Mod: TC | Performed by: INTERNAL MEDICINE

## 2025-08-25 PROCEDURE — 80048 BASIC METABOLIC PNL TOTAL CA: CPT | Performed by: HOSPITALIST

## 2025-08-25 PROCEDURE — 88305 TISSUE EXAM BY PATHOLOGIST: CPT | Mod: 26 | Performed by: PATHOLOGY

## 2025-08-25 PROCEDURE — 258N000003 HC RX IP 258 OP 636: Performed by: HOSPITALIST

## 2025-08-25 PROCEDURE — 370N000017 HC ANESTHESIA TECHNICAL FEE, PER MIN: Performed by: INTERNAL MEDICINE

## 2025-08-25 PROCEDURE — 96376 TX/PRO/DX INJ SAME DRUG ADON: CPT

## 2025-08-25 PROCEDURE — 250N000011 HC RX IP 250 OP 636: Performed by: HOSPITALIST

## 2025-08-25 PROCEDURE — 999N000010 HC STATISTIC ANES STAT CODE-CRNA PER MINUTE: Performed by: INTERNAL MEDICINE

## 2025-08-25 PROCEDURE — 83735 ASSAY OF MAGNESIUM: CPT | Performed by: HOSPITALIST

## 2025-08-25 RX ORDER — LIDOCAINE HYDROCHLORIDE 20 MG/ML
INJECTION, SOLUTION INFILTRATION; PERINEURAL PRN
Status: DISCONTINUED | OUTPATIENT
Start: 2025-08-25 | End: 2025-08-25

## 2025-08-25 RX ORDER — LIDOCAINE 40 MG/G
CREAM TOPICAL
Status: CANCELLED | OUTPATIENT
Start: 2025-08-25

## 2025-08-25 RX ORDER — ONDANSETRON 2 MG/ML
INJECTION INTRAMUSCULAR; INTRAVENOUS PRN
Status: DISCONTINUED | OUTPATIENT
Start: 2025-08-25 | End: 2025-08-25

## 2025-08-25 RX ORDER — PROPOFOL 10 MG/ML
INJECTION, EMULSION INTRAVENOUS PRN
Status: DISCONTINUED | OUTPATIENT
Start: 2025-08-25 | End: 2025-08-25

## 2025-08-25 RX ORDER — FLUMAZENIL 0.1 MG/ML
0.2 INJECTION, SOLUTION INTRAVENOUS
Status: ACTIVE | OUTPATIENT
Start: 2025-08-25 | End: 2025-08-26

## 2025-08-25 RX ORDER — PROPOFOL 10 MG/ML
INJECTION, EMULSION INTRAVENOUS CONTINUOUS PRN
Status: DISCONTINUED | OUTPATIENT
Start: 2025-08-25 | End: 2025-08-25

## 2025-08-25 RX ADMIN — ACETAMINOPHEN 650 MG: 325 TABLET ORAL at 23:38

## 2025-08-25 RX ADMIN — LIDOCAINE HYDROCHLORIDE 100 MG: 20 INJECTION, SOLUTION INFILTRATION; PERINEURAL at 12:20

## 2025-08-25 RX ADMIN — PANTOPRAZOLE SODIUM 40 MG: 40 TABLET, DELAYED RELEASE ORAL at 08:18

## 2025-08-25 RX ADMIN — OXYCODONE HYDROCHLORIDE 5 MG: 5 TABLET ORAL at 16:21

## 2025-08-25 RX ADMIN — SODIUM CHLORIDE, SODIUM LACTATE, POTASSIUM CHLORIDE, AND CALCIUM CHLORIDE: .6; .31; .03; .02 INJECTION, SOLUTION INTRAVENOUS at 08:14

## 2025-08-25 RX ADMIN — PROPOFOL 30 MG: 10 INJECTION, EMULSION INTRAVENOUS at 12:20

## 2025-08-25 RX ADMIN — PROPOFOL 200 MCG/KG/MIN: 10 INJECTION, EMULSION INTRAVENOUS at 12:20

## 2025-08-25 RX ADMIN — OXYCODONE HYDROCHLORIDE 5 MG: 5 TABLET ORAL at 20:42

## 2025-08-25 RX ADMIN — OXYCODONE HYDROCHLORIDE 5 MG: 5 TABLET ORAL at 08:18

## 2025-08-25 RX ADMIN — HYDROMORPHONE HYDROCHLORIDE 0.4 MG: 0.2 INJECTION, SOLUTION INTRAMUSCULAR; INTRAVENOUS; SUBCUTANEOUS at 05:05

## 2025-08-25 RX ADMIN — PANTOPRAZOLE SODIUM 40 MG: 40 TABLET, DELAYED RELEASE ORAL at 16:21

## 2025-08-25 RX ADMIN — PROPOFOL 20 MG: 10 INJECTION, EMULSION INTRAVENOUS at 12:23

## 2025-08-25 RX ADMIN — HYDROMORPHONE HYDROCHLORIDE 0.4 MG: 0.2 INJECTION, SOLUTION INTRAMUSCULAR; INTRAVENOUS; SUBCUTANEOUS at 00:25

## 2025-08-25 RX ADMIN — ONDANSETRON 4 MG: 2 INJECTION INTRAMUSCULAR; INTRAVENOUS at 12:20

## 2025-08-25 RX ADMIN — SODIUM CHLORIDE, SODIUM LACTATE, POTASSIUM CHLORIDE, AND CALCIUM CHLORIDE: .6; .31; .03; .02 INJECTION, SOLUTION INTRAVENOUS at 00:26

## 2025-08-25 ASSESSMENT — ACTIVITIES OF DAILY LIVING (ADL)
ADLS_ACUITY_SCORE: 58

## 2025-08-25 ASSESSMENT — LIFESTYLE VARIABLES: TOBACCO_USE: 0

## 2025-08-25 ASSESSMENT — ENCOUNTER SYMPTOMS: SEIZURES: 0

## 2025-08-26 VITALS
HEART RATE: 66 BPM | WEIGHT: 217 LBS | TEMPERATURE: 97.7 F | BODY MASS INDEX: 30.38 KG/M2 | HEIGHT: 71 IN | DIASTOLIC BLOOD PRESSURE: 84 MMHG | RESPIRATION RATE: 16 BRPM | OXYGEN SATURATION: 97 % | SYSTOLIC BLOOD PRESSURE: 135 MMHG

## 2025-08-26 PROCEDURE — 250N000013 HC RX MED GY IP 250 OP 250 PS 637: Performed by: HOSPITALIST

## 2025-08-26 PROCEDURE — 99239 HOSP IP/OBS DSCHRG MGMT >30: CPT | Performed by: INTERNAL MEDICINE

## 2025-08-26 RX ADMIN — PANTOPRAZOLE SODIUM 40 MG: 40 TABLET, DELAYED RELEASE ORAL at 07:40

## 2025-08-26 RX ADMIN — ACETAMINOPHEN 650 MG: 325 TABLET ORAL at 04:35

## 2025-08-26 RX ADMIN — OXYCODONE HYDROCHLORIDE 10 MG: 5 TABLET ORAL at 01:19

## 2025-08-26 ASSESSMENT — ACTIVITIES OF DAILY LIVING (ADL)
ADLS_ACUITY_SCORE: 58

## (undated) DEVICE — ENDO SNARE POLYPECTOMY OVAL 15MM LOOP SD-240U-15

## (undated) DEVICE — WIRE GUIDE 0.025"X270CM STR VISIGLIDE G-240-2527S

## (undated) DEVICE — SPHINCTEROTOME BILIARY NDL KNIFE 5MM 5FR TL  4584

## (undated) DEVICE — CATH TRAY FOLEY COUDE SURESTEP 16FR W/URNE MTR STLK A304716A

## (undated) DEVICE — ENDO BITE BLOCK 60 MAXI LF 00712804

## (undated) DEVICE — LUBRICANT INST ELECTROLUBE EL101

## (undated) DEVICE — ESU GROUND PAD ADULT W/CORD E7507

## (undated) DEVICE — Device

## (undated) DEVICE — BALLOON EXTRACTION 3-LUMEN 15X190MM 2.8MM TL B-V233P-A

## (undated) DEVICE — PREP CHLORAPREP 26ML TINTED HI-LITE ORANGE 930815

## (undated) DEVICE — DAVINCI HOT SHEARS TIP COVER  400180

## (undated) DEVICE — DECANTER BAG 2002S

## (undated) DEVICE — ENDO POUCH UNIV RETRIEVAL SYSTEM INZII 10MM CD001

## (undated) DEVICE — INTR ENDOSCOPIC STENT FUSION OASIS 09.0FRX200CM

## (undated) DEVICE — CLEANER INST PRE-KLENZ SOAK SHIELD TUBE 6 ML MEDIUM 2D66J4

## (undated) DEVICE — SU MONOCRYL 4-0 PS-2 18" UND Y496G

## (undated) DEVICE — ANTIFOG SOLUTION SEE SHARP 150M TROCAR SWABS 30978

## (undated) DEVICE — POUCH TISSUE RETRIEVAL ROBOTIC 8MM 5.1" INTRO TRS-ROBO-8

## (undated) DEVICE — DAVINCI XI DRAPE COLUMN 470341

## (undated) DEVICE — SU VICRYL 0 UR-6 27" J603H

## (undated) DEVICE — DRAPE SHEET REV FOLD 3/4 9349

## (undated) DEVICE — EVAC SYSTEM CLEAR FLOW SC082500

## (undated) DEVICE — DAVINCI XI SEAL UNIVERSAL 5-12MM 470500

## (undated) DEVICE — LINEN TOWEL PACK X5 5464

## (undated) DEVICE — SOL WATER IRRIG 1000ML BOTTLE 2F7114

## (undated) DEVICE — CLIP ENDO HEMO-LOC GREEN MED/LG 544230

## (undated) DEVICE — TUBING SUCTION 12"X1/4" N612

## (undated) DEVICE — ENDO CATH BALLOON EXTRACTOR PRO RX 09-12MM 4700

## (undated) DEVICE — RAD RX ISOVUE 300 (50ML) 61% IOPAMIDOL CHARGE PER ML

## (undated) DEVICE — PACK LAP CHOLE SLC15LCFSD

## (undated) DEVICE — DAVINCI XI OBTURATOR BLADELESS 8MM 470359

## (undated) DEVICE — WIRE GUIDE 0.035"X450CM JAGWIRE HP STR TIP M00556580

## (undated) DEVICE — DAVINCI XI SUCTION IRRIGATOR ENDOWRIST 480299

## (undated) DEVICE — ENDO TUBING W/CAP AUXILARY WATER INLET 100609 EGA-500

## (undated) DEVICE — SYR 10ML FINGER CONTROL W/O NDL 309695

## (undated) DEVICE — ESU GROUND PAD UNIVERSAL W/O CORD

## (undated) DEVICE — LIGHT HANDLE X2

## (undated) DEVICE — DRAPE BREAST/CHEST 29420

## (undated) DEVICE — DAVINCI XI DRAPE ARM 470015

## (undated) DEVICE — SU VICRYL 0 CT-2 27" J334H

## (undated) RX ORDER — DEXAMETHASONE SODIUM PHOSPHATE 4 MG/ML
INJECTION, SOLUTION INTRA-ARTICULAR; INTRALESIONAL; INTRAMUSCULAR; INTRAVENOUS; SOFT TISSUE
Status: DISPENSED
Start: 2024-02-27

## (undated) RX ORDER — PROPOFOL 10 MG/ML
INJECTION, EMULSION INTRAVENOUS
Status: DISPENSED
Start: 2024-08-27

## (undated) RX ORDER — FENTANYL CITRATE 0.05 MG/ML
INJECTION, SOLUTION INTRAMUSCULAR; INTRAVENOUS
Status: DISPENSED
Start: 2024-08-27

## (undated) RX ORDER — LIDOCAINE HYDROCHLORIDE 20 MG/ML
JELLY TOPICAL
Status: DISPENSED
Start: 2024-01-14

## (undated) RX ORDER — PROPOFOL 10 MG/ML
INJECTION, EMULSION INTRAVENOUS
Status: DISPENSED
Start: 2024-01-14

## (undated) RX ORDER — FENTANYL CITRATE 50 UG/ML
INJECTION, SOLUTION INTRAMUSCULAR; INTRAVENOUS
Status: DISPENSED
Start: 2024-08-27

## (undated) RX ORDER — DEXAMETHASONE SODIUM PHOSPHATE 4 MG/ML
INJECTION, SOLUTION INTRA-ARTICULAR; INTRALESIONAL; INTRAMUSCULAR; INTRAVENOUS; SOFT TISSUE
Status: DISPENSED
Start: 2024-08-27

## (undated) RX ORDER — ONDANSETRON 2 MG/ML
INJECTION INTRAMUSCULAR; INTRAVENOUS
Status: DISPENSED
Start: 2024-08-27

## (undated) RX ORDER — PROPOFOL 10 MG/ML
INJECTION, EMULSION INTRAVENOUS
Status: DISPENSED
Start: 2024-02-27

## (undated) RX ORDER — BUPIVACAINE HYDROCHLORIDE AND EPINEPHRINE 2.5; 5 MG/ML; UG/ML
INJECTION, SOLUTION EPIDURAL; INFILTRATION; INTRACAUDAL; PERINEURAL
Status: DISPENSED
Start: 2024-08-27

## (undated) RX ORDER — ONDANSETRON 2 MG/ML
INJECTION INTRAMUSCULAR; INTRAVENOUS
Status: DISPENSED
Start: 2024-02-27

## (undated) RX ORDER — HYDROCODONE BITARTRATE AND ACETAMINOPHEN 10; 325 MG/1; MG/1
TABLET ORAL
Status: DISPENSED
Start: 2024-08-27

## (undated) RX ORDER — FENTANYL CITRATE 50 UG/ML
INJECTION, SOLUTION INTRAMUSCULAR; INTRAVENOUS
Status: DISPENSED
Start: 2024-02-27

## (undated) RX ORDER — GLYCOPYRROLATE 0.2 MG/ML
INJECTION, SOLUTION INTRAMUSCULAR; INTRAVENOUS
Status: DISPENSED
Start: 2024-02-27

## (undated) RX ORDER — INDOMETHACIN 50 MG/1
SUPPOSITORY RECTAL
Status: DISPENSED
Start: 2024-02-27

## (undated) RX ORDER — HYDROCODONE BITARTRATE AND ACETAMINOPHEN 5; 325 MG/1; MG/1
TABLET ORAL
Status: DISPENSED
Start: 2024-08-27

## (undated) RX ORDER — HEPARIN SODIUM 5000 [USP'U]/.5ML
INJECTION, SOLUTION INTRAVENOUS; SUBCUTANEOUS
Status: DISPENSED
Start: 2024-08-27

## (undated) RX ORDER — FENTANYL CITRATE 50 UG/ML
INJECTION, SOLUTION INTRAMUSCULAR; INTRAVENOUS
Status: DISPENSED
Start: 2024-01-14

## (undated) RX ORDER — FENTANYL CITRATE 0.05 MG/ML
INJECTION, SOLUTION INTRAMUSCULAR; INTRAVENOUS
Status: DISPENSED
Start: 2024-02-27

## (undated) RX ORDER — HYDROMORPHONE HYDROCHLORIDE 1 MG/ML
INJECTION, SOLUTION INTRAMUSCULAR; INTRAVENOUS; SUBCUTANEOUS
Status: DISPENSED
Start: 2024-08-27